# Patient Record
Sex: MALE | Race: WHITE | NOT HISPANIC OR LATINO
[De-identification: names, ages, dates, MRNs, and addresses within clinical notes are randomized per-mention and may not be internally consistent; named-entity substitution may affect disease eponyms.]

---

## 2017-02-26 ENCOUNTER — FORM ENCOUNTER (OUTPATIENT)
Age: 52
End: 2017-02-26

## 2017-02-27 ENCOUNTER — OUTPATIENT (OUTPATIENT)
Dept: OUTPATIENT SERVICES | Facility: HOSPITAL | Age: 52
LOS: 1 days | End: 2017-02-27

## 2017-02-27 ENCOUNTER — APPOINTMENT (OUTPATIENT)
Dept: ULTRASOUND IMAGING | Facility: CLINIC | Age: 52
End: 2017-02-27

## 2017-02-27 DIAGNOSIS — Z00.8 ENCOUNTER FOR OTHER GENERAL EXAMINATION: ICD-10-CM

## 2017-03-06 ENCOUNTER — APPOINTMENT (OUTPATIENT)
Dept: ULTRASOUND IMAGING | Facility: HOSPITAL | Age: 52
End: 2017-03-06

## 2018-06-22 ENCOUNTER — INPATIENT (INPATIENT)
Facility: HOSPITAL | Age: 53
LOS: 13 days | Discharge: HOME | DRG: 853 | End: 2018-07-06
Attending: SURGERY | Admitting: HOSPITALIST
Payer: MEDICARE

## 2018-06-22 VITALS — HEIGHT: 67 IN | WEIGHT: 210.1 LBS

## 2018-06-22 PROCEDURE — 99285 EMERGENCY DEPT VISIT HI MDM: CPT

## 2018-06-23 DIAGNOSIS — Z98.890 OTHER SPECIFIED POSTPROCEDURAL STATES: Chronic | ICD-10-CM

## 2018-06-23 DIAGNOSIS — K57.92 DIVERTICULITIS OF INTESTINE, PART UNSPECIFIED, WITHOUT PERFORATION OR ABSCESS WITHOUT BLEEDING: ICD-10-CM

## 2018-06-23 DIAGNOSIS — I10 ESSENTIAL (PRIMARY) HYPERTENSION: ICD-10-CM

## 2018-06-23 LAB
ALBUMIN SERPL ELPH-MCNC: 4.4 G/DL — SIGNIFICANT CHANGE UP (ref 3.3–5.2)
ALP SERPL-CCNC: 78 U/L — SIGNIFICANT CHANGE UP (ref 40–120)
ALT FLD-CCNC: 19 U/L — SIGNIFICANT CHANGE UP
ANION GAP SERPL CALC-SCNC: 19 MMOL/L — HIGH (ref 5–17)
APPEARANCE UR: CLEAR — SIGNIFICANT CHANGE UP
APTT BLD: 26.7 SEC — LOW (ref 27.5–37.4)
AST SERPL-CCNC: 17 U/L — SIGNIFICANT CHANGE UP
BACTERIA # UR AUTO: ABNORMAL
BASOPHILS # BLD AUTO: 0 K/UL — SIGNIFICANT CHANGE UP (ref 0–0.2)
BASOPHILS NFR BLD AUTO: 0.2 % — SIGNIFICANT CHANGE UP (ref 0–2)
BILIRUB SERPL-MCNC: 0.4 MG/DL — SIGNIFICANT CHANGE UP (ref 0.4–2)
BILIRUB UR-MCNC: NEGATIVE — SIGNIFICANT CHANGE UP
BUN SERPL-MCNC: 17 MG/DL — SIGNIFICANT CHANGE UP (ref 8–20)
CALCIUM SERPL-MCNC: 9.4 MG/DL — SIGNIFICANT CHANGE UP (ref 8.6–10.2)
CHLORIDE SERPL-SCNC: 99 MMOL/L — SIGNIFICANT CHANGE UP (ref 98–107)
CO2 SERPL-SCNC: 20 MMOL/L — LOW (ref 22–29)
COLOR SPEC: YELLOW — SIGNIFICANT CHANGE UP
CREAT SERPL-MCNC: 1.3 MG/DL — SIGNIFICANT CHANGE UP (ref 0.5–1.3)
DIFF PNL FLD: NEGATIVE — SIGNIFICANT CHANGE UP
EOSINOPHIL # BLD AUTO: 0 K/UL — SIGNIFICANT CHANGE UP (ref 0–0.5)
EOSINOPHIL NFR BLD AUTO: 0.4 % — SIGNIFICANT CHANGE UP (ref 0–5)
GLUCOSE SERPL-MCNC: 135 MG/DL — HIGH (ref 70–115)
GLUCOSE UR QL: NEGATIVE MG/DL — SIGNIFICANT CHANGE UP
HCT VFR BLD CALC: 48.3 % — SIGNIFICANT CHANGE UP (ref 42–52)
HGB BLD-MCNC: 16.6 G/DL — SIGNIFICANT CHANGE UP (ref 14–18)
HYALINE CASTS # UR AUTO: ABNORMAL /LPF
INR BLD: 1.04 RATIO — SIGNIFICANT CHANGE UP (ref 0.88–1.16)
KETONES UR-MCNC: NEGATIVE — SIGNIFICANT CHANGE UP
LACTATE BLDV-MCNC: 2.3 MMOL/L — HIGH (ref 0.5–2)
LACTATE BLDV-MCNC: 3.7 MMOL/L — HIGH (ref 0.5–2)
LEUKOCYTE ESTERASE UR-ACNC: NEGATIVE — SIGNIFICANT CHANGE UP
LYMPHOCYTES # BLD AUTO: 0.8 K/UL — LOW (ref 1–4.8)
LYMPHOCYTES # BLD AUTO: 6 % — LOW (ref 20–55)
MAGNESIUM SERPL-MCNC: 1.5 MG/DL — LOW (ref 1.6–2.6)
MCHC RBC-ENTMCNC: 29.9 PG — SIGNIFICANT CHANGE UP (ref 27–31)
MCHC RBC-ENTMCNC: 34.4 G/DL — SIGNIFICANT CHANGE UP (ref 32–36)
MCV RBC AUTO: 86.9 FL — SIGNIFICANT CHANGE UP (ref 80–94)
MONOCYTES # BLD AUTO: 0.5 K/UL — SIGNIFICANT CHANGE UP (ref 0–0.8)
MONOCYTES NFR BLD AUTO: 3.6 % — SIGNIFICANT CHANGE UP (ref 3–10)
NEUTROPHILS # BLD AUTO: 11.9 K/UL — HIGH (ref 1.8–8)
NEUTROPHILS NFR BLD AUTO: 89.4 % — HIGH (ref 37–73)
NITRITE UR-MCNC: NEGATIVE — SIGNIFICANT CHANGE UP
PH UR: 5 — SIGNIFICANT CHANGE UP (ref 5–8)
PLATELET # BLD AUTO: 417 K/UL — HIGH (ref 150–400)
POTASSIUM SERPL-MCNC: 4.4 MMOL/L — SIGNIFICANT CHANGE UP (ref 3.5–5.3)
POTASSIUM SERPL-SCNC: 4.4 MMOL/L — SIGNIFICANT CHANGE UP (ref 3.5–5.3)
PROT SERPL-MCNC: 8 G/DL — SIGNIFICANT CHANGE UP (ref 6.6–8.7)
PROT UR-MCNC: 30 MG/DL
PROTHROM AB SERPL-ACNC: 11.5 SEC — SIGNIFICANT CHANGE UP (ref 9.8–12.7)
RBC # BLD: 5.56 M/UL — SIGNIFICANT CHANGE UP (ref 4.6–6.2)
RBC # FLD: 12.9 % — SIGNIFICANT CHANGE UP (ref 11–15.6)
SODIUM SERPL-SCNC: 138 MMOL/L — SIGNIFICANT CHANGE UP (ref 135–145)
SP GR SPEC: 1.01 — SIGNIFICANT CHANGE UP (ref 1.01–1.02)
TROPONIN T SERPL-MCNC: <0.01 NG/ML — SIGNIFICANT CHANGE UP (ref 0–0.06)
UROBILINOGEN FLD QL: NEGATIVE MG/DL — SIGNIFICANT CHANGE UP
WBC # BLD: 13.3 K/UL — HIGH (ref 4.8–10.8)
WBC # FLD AUTO: 13.3 K/UL — HIGH (ref 4.8–10.8)
WBC UR QL: SIGNIFICANT CHANGE UP

## 2018-06-23 PROCEDURE — 71046 X-RAY EXAM CHEST 2 VIEWS: CPT | Mod: 26

## 2018-06-23 PROCEDURE — 99223 1ST HOSP IP/OBS HIGH 75: CPT | Mod: AI

## 2018-06-23 PROCEDURE — 74177 CT ABD & PELVIS W/CONTRAST: CPT | Mod: 26

## 2018-06-23 PROCEDURE — 99223 1ST HOSP IP/OBS HIGH 75: CPT

## 2018-06-23 RX ORDER — CIPROFLOXACIN LACTATE 400MG/40ML
400 VIAL (ML) INTRAVENOUS ONCE
Qty: 0 | Refills: 0 | Status: COMPLETED | OUTPATIENT
Start: 2018-06-23 | End: 2018-06-23

## 2018-06-23 RX ORDER — ENOXAPARIN SODIUM 100 MG/ML
40 INJECTION SUBCUTANEOUS DAILY
Qty: 0 | Refills: 0 | Status: DISCONTINUED | OUTPATIENT
Start: 2018-06-23 | End: 2018-06-25

## 2018-06-23 RX ORDER — ONDANSETRON 8 MG/1
4 TABLET, FILM COATED ORAL EVERY 4 HOURS
Qty: 0 | Refills: 0 | Status: DISCONTINUED | OUTPATIENT
Start: 2018-06-23 | End: 2018-07-06

## 2018-06-23 RX ORDER — CIPROFLOXACIN LACTATE 400MG/40ML
400 VIAL (ML) INTRAVENOUS EVERY 12 HOURS
Qty: 0 | Refills: 0 | Status: DISCONTINUED | OUTPATIENT
Start: 2018-06-23 | End: 2018-06-23

## 2018-06-23 RX ORDER — ACETAMINOPHEN 500 MG
975 TABLET ORAL ONCE
Qty: 0 | Refills: 0 | Status: COMPLETED | OUTPATIENT
Start: 2018-06-23 | End: 2018-06-23

## 2018-06-23 RX ORDER — SACCHAROMYCES BOULARDII 250 MG
250 POWDER IN PACKET (EA) ORAL
Qty: 0 | Refills: 0 | Status: DISCONTINUED | OUTPATIENT
Start: 2018-06-23 | End: 2018-06-30

## 2018-06-23 RX ORDER — METRONIDAZOLE 500 MG
500 TABLET ORAL ONCE
Qty: 0 | Refills: 0 | Status: COMPLETED | OUTPATIENT
Start: 2018-06-23 | End: 2018-06-23

## 2018-06-23 RX ORDER — LOSARTAN POTASSIUM 100 MG/1
1 TABLET, FILM COATED ORAL
Qty: 0 | Refills: 0 | COMMUNITY

## 2018-06-23 RX ORDER — LOSARTAN POTASSIUM 100 MG/1
25 TABLET, FILM COATED ORAL DAILY
Qty: 0 | Refills: 0 | Status: DISCONTINUED | OUTPATIENT
Start: 2018-06-23 | End: 2018-06-30

## 2018-06-23 RX ORDER — MAGNESIUM SULFATE 500 MG/ML
1 VIAL (ML) INJECTION ONCE
Qty: 0 | Refills: 0 | Status: COMPLETED | OUTPATIENT
Start: 2018-06-23 | End: 2018-06-23

## 2018-06-23 RX ORDER — MORPHINE SULFATE 50 MG/1
4 CAPSULE, EXTENDED RELEASE ORAL ONCE
Qty: 0 | Refills: 0 | Status: DISCONTINUED | OUTPATIENT
Start: 2018-06-23 | End: 2018-06-23

## 2018-06-23 RX ORDER — METOCLOPRAMIDE HCL 10 MG
10 TABLET ORAL ONCE
Qty: 0 | Refills: 0 | Status: COMPLETED | OUTPATIENT
Start: 2018-06-23 | End: 2018-06-23

## 2018-06-23 RX ORDER — HYDROMORPHONE HYDROCHLORIDE 2 MG/ML
1 INJECTION INTRAMUSCULAR; INTRAVENOUS; SUBCUTANEOUS EVERY 4 HOURS
Qty: 0 | Refills: 0 | Status: DISCONTINUED | OUTPATIENT
Start: 2018-06-23 | End: 2018-06-30

## 2018-06-23 RX ORDER — LOSARTAN POTASSIUM 100 MG/1
25 TABLET, FILM COATED ORAL DAILY
Qty: 0 | Refills: 0 | Status: DISCONTINUED | OUTPATIENT
Start: 2018-06-23 | End: 2018-06-23

## 2018-06-23 RX ORDER — METRONIDAZOLE 500 MG
500 TABLET ORAL EVERY 8 HOURS
Qty: 0 | Refills: 0 | Status: DISCONTINUED | OUTPATIENT
Start: 2018-06-23 | End: 2018-06-23

## 2018-06-23 RX ORDER — PANTOPRAZOLE SODIUM 20 MG/1
40 TABLET, DELAYED RELEASE ORAL
Qty: 0 | Refills: 0 | Status: DISCONTINUED | OUTPATIENT
Start: 2018-06-23 | End: 2018-06-30

## 2018-06-23 RX ORDER — MORPHINE SULFATE 50 MG/1
2 CAPSULE, EXTENDED RELEASE ORAL EVERY 4 HOURS
Qty: 0 | Refills: 0 | Status: DISCONTINUED | OUTPATIENT
Start: 2018-06-23 | End: 2018-06-23

## 2018-06-23 RX ORDER — SODIUM CHLORIDE 9 MG/ML
2500 INJECTION INTRAMUSCULAR; INTRAVENOUS; SUBCUTANEOUS
Qty: 0 | Refills: 0 | Status: DISCONTINUED | OUTPATIENT
Start: 2018-06-23 | End: 2018-06-23

## 2018-06-23 RX ORDER — ERTAPENEM SODIUM 1 G/1
1000 INJECTION, POWDER, LYOPHILIZED, FOR SOLUTION INTRAMUSCULAR; INTRAVENOUS EVERY 24 HOURS
Qty: 0 | Refills: 0 | Status: DISCONTINUED | OUTPATIENT
Start: 2018-06-23 | End: 2018-06-25

## 2018-06-23 RX ORDER — SODIUM CHLORIDE 9 MG/ML
1000 INJECTION, SOLUTION INTRAVENOUS
Qty: 0 | Refills: 0 | Status: DISCONTINUED | OUTPATIENT
Start: 2018-06-23 | End: 2018-06-29

## 2018-06-23 RX ORDER — MORPHINE SULFATE 50 MG/1
2 CAPSULE, EXTENDED RELEASE ORAL EVERY 4 HOURS
Qty: 0 | Refills: 0 | Status: DISCONTINUED | OUTPATIENT
Start: 2018-06-23 | End: 2018-06-30

## 2018-06-23 RX ORDER — MAGNESIUM SULFATE 500 MG/ML
2 VIAL (ML) INJECTION ONCE
Qty: 0 | Refills: 0 | Status: COMPLETED | OUTPATIENT
Start: 2018-06-23 | End: 2018-06-23

## 2018-06-23 RX ADMIN — SODIUM CHLORIDE 125 MILLILITER(S): 9 INJECTION, SOLUTION INTRAVENOUS at 21:16

## 2018-06-23 RX ADMIN — MORPHINE SULFATE 2 MILLIGRAM(S): 50 CAPSULE, EXTENDED RELEASE ORAL at 16:39

## 2018-06-23 RX ADMIN — ENOXAPARIN SODIUM 40 MILLIGRAM(S): 100 INJECTION SUBCUTANEOUS at 12:29

## 2018-06-23 RX ADMIN — MORPHINE SULFATE 2 MILLIGRAM(S): 50 CAPSULE, EXTENDED RELEASE ORAL at 10:49

## 2018-06-23 RX ADMIN — Medication 100 GRAM(S): at 03:35

## 2018-06-23 RX ADMIN — Medication 104 MILLIGRAM(S): at 00:45

## 2018-06-23 RX ADMIN — Medication 100 MILLIGRAM(S): at 05:02

## 2018-06-23 RX ADMIN — MORPHINE SULFATE 4 MILLIGRAM(S): 50 CAPSULE, EXTENDED RELEASE ORAL at 01:05

## 2018-06-23 RX ADMIN — ERTAPENEM SODIUM 120 MILLIGRAM(S): 1 INJECTION, POWDER, LYOPHILIZED, FOR SOLUTION INTRAMUSCULAR; INTRAVENOUS at 15:06

## 2018-06-23 RX ADMIN — MORPHINE SULFATE 4 MILLIGRAM(S): 50 CAPSULE, EXTENDED RELEASE ORAL at 00:45

## 2018-06-23 RX ADMIN — Medication 975 MILLIGRAM(S): at 03:30

## 2018-06-23 RX ADMIN — Medication 250 MILLIGRAM(S): at 17:05

## 2018-06-23 RX ADMIN — HYDROMORPHONE HYDROCHLORIDE 1 MILLIGRAM(S): 2 INJECTION INTRAMUSCULAR; INTRAVENOUS; SUBCUTANEOUS at 21:15

## 2018-06-23 RX ADMIN — Medication 50 GRAM(S): at 12:28

## 2018-06-23 RX ADMIN — Medication 200 MILLIGRAM(S): at 03:35

## 2018-06-23 RX ADMIN — SODIUM CHLORIDE 125 MILLILITER(S): 9 INJECTION, SOLUTION INTRAVENOUS at 12:28

## 2018-06-23 RX ADMIN — MORPHINE SULFATE 2 MILLIGRAM(S): 50 CAPSULE, EXTENDED RELEASE ORAL at 11:05

## 2018-06-23 RX ADMIN — HYDROMORPHONE HYDROCHLORIDE 1 MILLIGRAM(S): 2 INJECTION INTRAMUSCULAR; INTRAVENOUS; SUBCUTANEOUS at 21:30

## 2018-06-23 RX ADMIN — PANTOPRAZOLE SODIUM 40 MILLIGRAM(S): 20 TABLET, DELAYED RELEASE ORAL at 12:28

## 2018-06-23 RX ADMIN — SODIUM CHLORIDE 500 MILLILITER(S): 9 INJECTION INTRAMUSCULAR; INTRAVENOUS; SUBCUTANEOUS at 00:45

## 2018-06-23 RX ADMIN — MORPHINE SULFATE 2 MILLIGRAM(S): 50 CAPSULE, EXTENDED RELEASE ORAL at 17:01

## 2018-06-23 RX ADMIN — SODIUM CHLORIDE 500 MILLILITER(S): 9 INJECTION INTRAMUSCULAR; INTRAVENOUS; SUBCUTANEOUS at 08:03

## 2018-06-23 RX ADMIN — SODIUM CHLORIDE 500 MILLILITER(S): 9 INJECTION INTRAMUSCULAR; INTRAVENOUS; SUBCUTANEOUS at 00:46

## 2018-06-23 RX ADMIN — SODIUM CHLORIDE 125 MILLILITER(S): 9 INJECTION, SOLUTION INTRAVENOUS at 16:40

## 2018-06-23 NOTE — ED PROVIDER NOTE - PHYSICAL EXAMINATION
Constitutional : Appears uncomfortably,in moderate distress,  talking in short sentences  Head :NC AT , no swelling  Eyes :eomi, no swelling  Mouth :mm dry,  Neck : supple, trachea in midline  Chest :wheezing, distant crackles  Heart :S1 S2 distant  Abdomen :abd distended, decreased bowel sounds, LLQ tenderness with localized guarding  Musc/Skel :ext no swelling, no deformity, no spine tenderness, distal pulses present  Neuro  :AAO 3 no focal deficits

## 2018-06-23 NOTE — H&P ADULT - RS GEN PE MLT RESP DETAILS PC
no rales/good air movement/no rhonchi/respirations non-labored/clear to auscultation bilaterally/airway patent/breath sounds equal

## 2018-06-23 NOTE — H&P ADULT - HISTORY OF PRESENT ILLNESS
53 yo male with HTN presented to the ED last night with c/o abdominal pain , started around 5 pm sudden onset in lower abdomen sharp radiates to both abdominal area , mostly in the right lower area . It is controlled with pain meds , now intermittent . He denies any associated nausea, vomiting

## 2018-06-23 NOTE — ED ADULT NURSE REASSESSMENT NOTE - NS ED NURSE REASSESS COMMENT FT1
pt c/o a lot of abd pain Dr Frederick made aware medication to be ordered.
pt febrile at 105.3 rectal. MD made aware, IV fluids initiated. pt ambulating with steady gait to bathroom. will continue to monitor.
pt received asleep easily aroused resp even and unlabored states that he feels a little better c/o 7/10 abd pain no diarrhea voiding well. pt iv fluids infusing well no redness or swelling present. pt awaiting bed assignment.

## 2018-06-23 NOTE — H&P ADULT - NSHPLABSRESULTS_GEN_ALL_CORE
16.6   13.3  )-----------( 417      ( 23 Jun 2018 01:02 )             48.3   06-23    138  |  99  |  17.0  ----------------------------<  135<H>  4.4   |  20.0<L>  |  1.30    Ca    9.4      23 Jun 2018 01:02  Mg     1.5     06-23    TPro  8.0  /  Alb  4.4  /  TBili  0.4  /  DBili  x   /  AST  17  /  ALT  19  /  AlkPhos  78  06-23

## 2018-06-23 NOTE — ED PROVIDER NOTE - OBJECTIVE STATEMENT
51 y/o M Pt with a hx of HTN, HDL, borderline diabetic, presents to the ED with chills and lower abd pain onset 5 hours ago today. Pt is also experiencing constipation and is taking pain medications for chronic back pain. He also notes penile discharge. Current smoker. Denies N/V/D, recent travel, recent abx, dysuria, hematuria. No other acute complaints at this time.

## 2018-06-23 NOTE — H&P ADULT - ASSESSMENT
51 yo male obese smoker with HTN admitted for abdominal pain , fever , CT scan positive for acute diverticulitis   sepsis on admission

## 2018-06-23 NOTE — ED ADULT NURSE NOTE - OBJECTIVE STATEMENT
pt a+ox3, presents to ED c/o abd pain. pt denies n/v/d, fever or chills. pt reports sudden onset of pain.

## 2018-06-23 NOTE — H&P ADULT - PROBLEM SELECTOR PLAN 1
iv invanz, morphine for pain, NPO  Gi consult called   sepsis work up blood cx ordered  repeat lactate in am , cont iv hydration

## 2018-06-23 NOTE — CONSULT NOTE ADULT - SUBJECTIVE AND OBJECTIVE BOX
Patient is a 52y old  Male who presents with a chief complaint of abdominal pain (23 Jun 2018 12:08)      HPI:  51 yo male with HTN , chronic back pain on Vicodin. He began with hypogastric pain radiating to right lower quadrant. Pain is sharp, continuous, severe  and started about 18 hours ago. Better after pain meds in ER. Now 5/10 intensity. No nausea or vomiting. + chills, but did not check temp. Fever 105 in ER . + mild constipation that he associated with vicodin. Has never had colonoscopy. NO family hx of colon cancer or polyps.         REVIEW OF SYSTEMS:  Constitutional: No fever, weight loss or fatigue  ENMT:  No difficulty hearing, tinnitus, vertigo; No sinus or throat pain  Respiratory: No cough, wheezing, chills or hemoptysis  Cardiovascular: No chest pain, palpitations, dizziness or leg swelling  Gastrointestinal: + abdominal  pain. No nausea, vomiting or hematemesis; No diarrhea or constipation. No melena or hematochezia.  Skin: No itching, burning, rashes or lesions   Musculoskeletal: No joint pain or swelling; No muscle, back or extremity pain    PAST MEDICAL & SURGICAL HISTORY:  Essential hypertension  Herniated cervical disc: with myelopathy into hands  H/O hernia repair      FAMILY HISTORY:  No pertinent family history in first degree relatives      SOCIAL HISTORY:  Smoking Status: [ ] Current, [ ] Former, [ ] Never  Pack Years:  [  ] EtOH-no  [  ] IVDA-no    MEDICATIONS:  MEDICATIONS  (STANDING):  enoxaparin Injectable 40 milliGRAM(s) SubCutaneous daily  ertapenem  IVPB 1000 milliGRAM(s) IV Intermittent every 24 hours  lactated ringers. 1000 milliLiter(s) (125 mL/Hr) IV Continuous <Continuous>  losartan 25 milliGRAM(s) Oral daily  pantoprazole    Tablet 40 milliGRAM(s) Oral before breakfast  saccharomyces boulardii 250 milliGRAM(s) Oral two times a day    MEDICATIONS  (PRN):  HYDROmorphone  Injectable 1 milliGRAM(s) IV Push every 4 hours PRN Severe Pain (7 - 10)  morphine  - Injectable 2 milliGRAM(s) IV Push every 4 hours PRN Moderate Pain (4 - 6)  ondansetron Injectable 4 milliGRAM(s) IV Push every 4 hours PRN Nausea and/or Vomiting      Allergies    No Known Allergies    Intolerances        Vital Signs Last 24 Hrs  T(C): 38.2 (23 Jun 2018 10:54), Max: 40.7 (23 Jun 2018 03:32)  T(F): 100.7 (23 Jun 2018 10:54), Max: 105.3 (23 Jun 2018 03:32)  HR: 89 (23 Jun 2018 10:54) (89 - 125)  BP: 117/69 (23 Jun 2018 10:54) (107/63 - 160/98)  BP(mean): --  RR: 20 (23 Jun 2018 10:54) (16 - 20)  SpO2: 94% (23 Jun 2018 10:54) (94% - 96%)        PHYSICAL EXAM:    General: overweight  well nourished; in no acute distress  HEENT: MMM, conjunctiva and sclera clear  H- RRR  L- CTA  Gastrointestinal: Soft, + -tender on palpation in the hypogastric area. non-distended; Normal bowel sounds; No rebound or guarding  Extremities: Normal range of motion, No clubbing, cyanosis or edema  Neurological: Alert and oriented x3  Skin: Warm and dry. No obvious rash        LABS:                        16.6   13.3  )-----------( 417      ( 23 Jun 2018 01:02 )             48.3     23 Jun 2018 01:02    138    |  99     |  17.0   ----------------------------<  135    4.4     |  20.0   |  1.30     Ca    9.4        23 Jun 2018 01:02  Mg     1.5       23 Jun 2018 01:02    TPro  8.0    /  Alb  4.4    /  TBili  0.4    /  DBili  x      /  AST  17     /  ALT  19     /  AlkPhos  78     / Amylase x      /Lipase x      23 Jun 2018 01:02              RADIOLOGY & ADDITIONAL STUDIES:     < from: CT Abdomen and Pelvis w/ IV Cont (06.23.18 @ 04:42) >  MPRESSION:   Acute uncomplicated sigmoid diverticulitis.    Nonspecific trang hepatis and upper abdominal lymph nodes, mildly   increased in size from prior study.    Trace right pleural effusion. Nonspecific groundglass attenuation right   lower lobe which could be due to atelectasis.    < end of copied text >

## 2018-06-23 NOTE — ED PROVIDER NOTE - NS ED ROS FT
no weight change, no fever +chills  no rash, no bruises  no visual changes no eye discharge  no cough cold or congestion,   no sob, no chest pain  no orthopnea, no pnd  +abd pain, no n/v/d  no hematuria, no change in urinary habits  no joint pain, no deformity  no headache, no paresthesia

## 2018-06-24 DIAGNOSIS — A41.89 OTHER SPECIFIED SEPSIS: ICD-10-CM

## 2018-06-24 LAB
ANION GAP SERPL CALC-SCNC: 14 MMOL/L — SIGNIFICANT CHANGE UP (ref 5–17)
BUN SERPL-MCNC: 14 MG/DL — SIGNIFICANT CHANGE UP (ref 8–20)
C TRACH RRNA SPEC QL NAA+PROBE: SIGNIFICANT CHANGE UP
CALCIUM SERPL-MCNC: 8.8 MG/DL — SIGNIFICANT CHANGE UP (ref 8.6–10.2)
CHLORIDE SERPL-SCNC: 100 MMOL/L — SIGNIFICANT CHANGE UP (ref 98–107)
CO2 SERPL-SCNC: 22 MMOL/L — SIGNIFICANT CHANGE UP (ref 22–29)
CREAT SERPL-MCNC: 1.04 MG/DL — SIGNIFICANT CHANGE UP (ref 0.5–1.3)
CULTURE RESULTS: SIGNIFICANT CHANGE UP
GLUCOSE SERPL-MCNC: 96 MG/DL — SIGNIFICANT CHANGE UP (ref 70–115)
HCT VFR BLD CALC: 40 % — LOW (ref 42–52)
HGB BLD-MCNC: 13.3 G/DL — LOW (ref 14–18)
LACTATE BLDV-MCNC: 1.2 MMOL/L — SIGNIFICANT CHANGE UP (ref 0.5–2)
MCHC RBC-ENTMCNC: 29.2 PG — SIGNIFICANT CHANGE UP (ref 27–31)
MCHC RBC-ENTMCNC: 33.3 G/DL — SIGNIFICANT CHANGE UP (ref 32–36)
MCV RBC AUTO: 87.7 FL — SIGNIFICANT CHANGE UP (ref 80–94)
N GONORRHOEA RRNA SPEC QL NAA+PROBE: SIGNIFICANT CHANGE UP
PLATELET # BLD AUTO: 300 K/UL — SIGNIFICANT CHANGE UP (ref 150–400)
POTASSIUM SERPL-MCNC: 4.5 MMOL/L — SIGNIFICANT CHANGE UP (ref 3.5–5.3)
POTASSIUM SERPL-SCNC: 4.5 MMOL/L — SIGNIFICANT CHANGE UP (ref 3.5–5.3)
RBC # BLD: 4.56 M/UL — LOW (ref 4.6–6.2)
RBC # FLD: 13.3 % — SIGNIFICANT CHANGE UP (ref 11–15.6)
SODIUM SERPL-SCNC: 136 MMOL/L — SIGNIFICANT CHANGE UP (ref 135–145)
SPECIMEN SOURCE: SIGNIFICANT CHANGE UP
SPECIMEN SOURCE: SIGNIFICANT CHANGE UP
WBC # BLD: 14.2 K/UL — HIGH (ref 4.8–10.8)
WBC # FLD AUTO: 14.2 K/UL — HIGH (ref 4.8–10.8)

## 2018-06-24 PROCEDURE — 99233 SBSQ HOSP IP/OBS HIGH 50: CPT

## 2018-06-24 RX ADMIN — ERTAPENEM SODIUM 120 MILLIGRAM(S): 1 INJECTION, POWDER, LYOPHILIZED, FOR SOLUTION INTRAMUSCULAR; INTRAVENOUS at 17:40

## 2018-06-24 RX ADMIN — SODIUM CHLORIDE 125 MILLILITER(S): 9 INJECTION, SOLUTION INTRAVENOUS at 13:24

## 2018-06-24 RX ADMIN — SODIUM CHLORIDE 125 MILLILITER(S): 9 INJECTION, SOLUTION INTRAVENOUS at 21:01

## 2018-06-24 RX ADMIN — HYDROMORPHONE HYDROCHLORIDE 1 MILLIGRAM(S): 2 INJECTION INTRAMUSCULAR; INTRAVENOUS; SUBCUTANEOUS at 02:50

## 2018-06-24 RX ADMIN — MORPHINE SULFATE 2 MILLIGRAM(S): 50 CAPSULE, EXTENDED RELEASE ORAL at 20:14

## 2018-06-24 RX ADMIN — HYDROMORPHONE HYDROCHLORIDE 1 MILLIGRAM(S): 2 INJECTION INTRAMUSCULAR; INTRAVENOUS; SUBCUTANEOUS at 13:28

## 2018-06-24 RX ADMIN — SODIUM CHLORIDE 125 MILLILITER(S): 9 INJECTION, SOLUTION INTRAVENOUS at 12:04

## 2018-06-24 RX ADMIN — PANTOPRAZOLE SODIUM 40 MILLIGRAM(S): 20 TABLET, DELAYED RELEASE ORAL at 05:45

## 2018-06-24 RX ADMIN — SODIUM CHLORIDE 125 MILLILITER(S): 9 INJECTION, SOLUTION INTRAVENOUS at 02:51

## 2018-06-24 RX ADMIN — Medication 250 MILLIGRAM(S): at 05:45

## 2018-06-24 RX ADMIN — MORPHINE SULFATE 2 MILLIGRAM(S): 50 CAPSULE, EXTENDED RELEASE ORAL at 20:10

## 2018-06-24 RX ADMIN — HYDROMORPHONE HYDROCHLORIDE 1 MILLIGRAM(S): 2 INJECTION INTRAMUSCULAR; INTRAVENOUS; SUBCUTANEOUS at 03:05

## 2018-06-24 RX ADMIN — HYDROMORPHONE HYDROCHLORIDE 1 MILLIGRAM(S): 2 INJECTION INTRAMUSCULAR; INTRAVENOUS; SUBCUTANEOUS at 07:01

## 2018-06-24 RX ADMIN — Medication 250 MILLIGRAM(S): at 17:40

## 2018-06-24 RX ADMIN — HYDROMORPHONE HYDROCHLORIDE 1 MILLIGRAM(S): 2 INJECTION INTRAMUSCULAR; INTRAVENOUS; SUBCUTANEOUS at 07:16

## 2018-06-24 RX ADMIN — LOSARTAN POTASSIUM 25 MILLIGRAM(S): 100 TABLET, FILM COATED ORAL at 05:45

## 2018-06-24 RX ADMIN — HYDROMORPHONE HYDROCHLORIDE 1 MILLIGRAM(S): 2 INJECTION INTRAMUSCULAR; INTRAVENOUS; SUBCUTANEOUS at 13:23

## 2018-06-24 RX ADMIN — ENOXAPARIN SODIUM 40 MILLIGRAM(S): 100 INJECTION SUBCUTANEOUS at 12:04

## 2018-06-24 NOTE — PROGRESS NOTE ADULT - SUBJECTIVE AND OBJECTIVE BOX
Patient is a 52y old  Male who presents with a chief complaint of abdominal pain (23 Jun 2018 12:08)      HPI:  51 yo male with HTN - stateslower mid abdominal pain is still severe at times  until he gets pain meds. ( can go up to 9/10 severity, but 4/10 with pain meds). Temp was 100.1 . No nausea. has been NPO    REVIEW OF SYSTEMS:  Constitutional: No fever, weight loss or fatigue  ENMT:  No difficulty hearing, tinnitus, vertigo; No sinus or throat pain  Respiratory: No cough, wheezing, chills or hemoptysis  Cardiovascular: No chest pain, palpitations, dizziness or leg swelling  Gastrointestinal:+ abdominal or epigastric pain. No nausea, vomiting or hematemesis; No diarrhea or constipation. No melena or hematochezia.  Skin: No itching, burning, rashes or lesions   Musculoskeletal: No joint pain or swelling; No muscle, back or extremity pain    PAST MEDICAL & SURGICAL HISTORY:  Essential hypertension  Herniated cervical disc: with myelopathy into hands  H/O hernia repair      FAMILY HISTORY:  No pertinent family history in first degree relatives      SOCIAL HISTORY:  Smoking Status: [ ] Current, [ ] Former, [ ] Never  Pack Years:  [  ] EtOH-no  [  ] IVDA-no    MEDICATIONS:  MEDICATIONS  (STANDING):  enoxaparin Injectable 40 milliGRAM(s) SubCutaneous daily  ertapenem  IVPB 1000 milliGRAM(s) IV Intermittent every 24 hours  lactated ringers. 1000 milliLiter(s) (125 mL/Hr) IV Continuous <Continuous>  losartan 25 milliGRAM(s) Oral daily  pantoprazole    Tablet 40 milliGRAM(s) Oral before breakfast  saccharomyces boulardii 250 milliGRAM(s) Oral two times a day    MEDICATIONS  (PRN):  HYDROmorphone  Injectable 1 milliGRAM(s) IV Push every 4 hours PRN Severe Pain (7 - 10)  morphine  - Injectable 2 milliGRAM(s) IV Push every 4 hours PRN Moderate Pain (4 - 6)  ondansetron Injectable 4 milliGRAM(s) IV Push every 4 hours PRN Nausea and/or Vomiting      Allergies    No Known Allergies    Intolerances        Vital Signs Last 24 Hrs  T(C): 37.8 (24 Jun 2018 08:01), Max: 38.2 (23 Jun 2018 10:54)  T(F): 100.1 (24 Jun 2018 08:01), Max: 100.7 (23 Jun 2018 10:54)  HR: 98 (24 Jun 2018 08:01) (84 - 98)  BP: 142/88 (24 Jun 2018 08:01) (117/69 - 142/88)  BP(mean): --  RR: 18 (24 Jun 2018 08:01) (18 - 20)  SpO2: 93% (24 Jun 2018 08:01) (93% - 94%)    06-23 @ 07:01  -  06-24 @ 07:00  --------------------------------------------------------  IN: 1500 mL / OUT: 0 mL / NET: 1500 mL          PHYSICAL EXAM:    General: Well developed; overweight  appear to be uncomfortable   HEENT: MMM, conjunctiva and sclera clear  H- RRR  l -CTA  Gastrointestinal: Soft, + -tenderness in the lower abdomen , non-distended; Normal bowel sounds; No rebound or guarding  Extremities: Normal range of motion, No clubbing, cyanosis or edema  Neurological: Alert and oriented x3  Skin: Warm and dry. No obvious rash      LABS:                        13.3   14.2  )-----------( 300      ( 24 Jun 2018 05:39 )             40.0     24 Jun 2018 05:39    136    |  100    |  14.0   ----------------------------<  96     4.5     |  22.0   |  1.04     Ca    8.8        24 Jun 2018 05:39  Mg     1.5       23 Jun 2018 01:02    TPro  8.0    /  Alb  4.4    /  TBili  0.4    /  DBili  x      /  AST  17     /  ALT  19     /  AlkPhos  78     / Amylase x      /Lipase x      23 Jun 2018 01:02              RADIOLOGY & ADDITIONAL STUDIES:     < from: CT Abdomen and Pelvis w/ IV Cont (06.23.18 @ 04:42) >  MPRESSION:   Acute uncomplicated sigmoid diverticulitis.    Nonspecific trang hepatis and upper abdominal lymph nodes, mildly   increased in size from prior study.    Trace right pleural effusion. Nonspecific groundglass attenuation right   lower lobe which could be due to atelectasis.    < end of copied text >

## 2018-06-24 NOTE — PROGRESS NOTE ADULT - SUBJECTIVE AND OBJECTIVE BOX
Patient is a 52y old  Male who presents with a chief complaint of abdominal pain   still having abdominal pain on off ,  relieves  with pain medication    HPI:  53 yo male with HTN presented to the ED last night with c/o abdominal pain , started around 5 pm sudden onset in lower abdomen sharp radiates to both abdominal area , mostly in the right lower area . It is controlled with pain meds , now intermittent . He denies any associated nausea, vomiting (2018 12:08)      Allergies    No Known Allergies    Intolerances        REVIEW OF SYSTEMS:  abdominal pain     Vital Signs Last 24 Hrs  T(C): 37.4 (2018 12:13), Max: 37.8 (2018 05:30)  T(F): 99.3 (2018 12:13), Max: 100.1 (2018 05:30)  HR: 101 (:13) (84 - 101)  BP: 148/92 (2018 12:13) (120/72 - 148/92)  BP(mean): --  RR: 18 (2018 12:13) (18 - 20)  SpO2: 92% (2018 12:13) (92% - 94%)    PHYSICAL EXAM:    GENERAL: NAD, well-groomed  CHEST/LUNG: Clear to auscultation  bilaterally; No rales, rhonchi, wheezing   HEART: Regular rate and rhythm; S1 /S2  No murmurs, rubs  ABDOMEN: Soft, RLQ tenderness on palpation , + rebound Nondistended; Bowel sounds present  EXTREMITIES:  No clubbing, cyanosis, or edema      LABS:                        13.3   14.2  )-----------( 300      ( 2018 05:39 )             40.0     06-24    136  |  100  |  14.0  ----------------------------<  96  4.5   |  22.0  |  1.04    Ca    8.8      2018 05:39  Mg     1.5     06-23    TPro  8.0  /  Alb  4.4  /  TBili  0.4  /  DBili  x   /  AST  17  /  ALT  19  /  AlkPhos  78  06-23    PT/INR - ( 2018 01:02 )   PT: 11.5 sec;   INR: 1.04 ratio         PTT - ( 2018 01:02 )  PTT:26.7 sec  Urinalysis Basic - ( 2018 04:10 )    Color: Yellow / Appearance: Clear / S.015 / pH: x  Gluc: x / Ketone: Negative  / Bili: Negative / Urobili: Negative mg/dL   Blood: x / Protein: 30 mg/dL / Nitrite: Negative   Leuk Esterase: Negative / RBC: x / WBC 0-2   Sq Epi: x / Non Sq Epi: x / Bacteria: Occasional        RADIOLOGY & ADDITIONAL TESTS:

## 2018-06-24 NOTE — PROGRESS NOTE ADULT - PROBLEM SELECTOR PLAN 1
- being treated with Invanz  - will  keep NPO until pain is better  - lactate is now normal, but WBC elevated, fever is better,   - check blood cultures

## 2018-06-25 DIAGNOSIS — K57.20 DIVERTICULITIS OF LARGE INTESTINE WITH PERFORATION AND ABSCESS WITHOUT BLEEDING: ICD-10-CM

## 2018-06-25 DIAGNOSIS — A41.9 SEPSIS, UNSPECIFIED ORGANISM: ICD-10-CM

## 2018-06-25 LAB
ALBUMIN SERPL ELPH-MCNC: 3.5 G/DL — SIGNIFICANT CHANGE UP (ref 3.3–5.2)
ALP SERPL-CCNC: 60 U/L — SIGNIFICANT CHANGE UP (ref 40–120)
ALT FLD-CCNC: 9 U/L — SIGNIFICANT CHANGE UP
ANION GAP SERPL CALC-SCNC: 15 MMOL/L — SIGNIFICANT CHANGE UP (ref 5–17)
APTT BLD: 28.3 SEC — SIGNIFICANT CHANGE UP (ref 27.5–37.4)
AST SERPL-CCNC: <5 U/L — SIGNIFICANT CHANGE UP
B PERT IGG+IGM PNL SER: ABNORMAL
BASOPHILS # BLD AUTO: 0 K/UL — SIGNIFICANT CHANGE UP (ref 0–0.2)
BASOPHILS NFR BLD AUTO: 0.1 % — SIGNIFICANT CHANGE UP (ref 0–2)
BILIRUB SERPL-MCNC: 0.9 MG/DL — SIGNIFICANT CHANGE UP (ref 0.4–2)
BUN SERPL-MCNC: 13 MG/DL — SIGNIFICANT CHANGE UP (ref 8–20)
CALCIUM SERPL-MCNC: 8.9 MG/DL — SIGNIFICANT CHANGE UP (ref 8.6–10.2)
CHLORIDE SERPL-SCNC: 94 MMOL/L — LOW (ref 98–107)
CO2 SERPL-SCNC: 23 MMOL/L — SIGNIFICANT CHANGE UP (ref 22–29)
COLOR FLD: YELLOW
CREAT SERPL-MCNC: 1.02 MG/DL — SIGNIFICANT CHANGE UP (ref 0.5–1.3)
EOSINOPHIL # BLD AUTO: 0 K/UL — SIGNIFICANT CHANGE UP (ref 0–0.5)
EOSINOPHIL NFR BLD AUTO: 0.1 % — SIGNIFICANT CHANGE UP (ref 0–5)
FLUID INTAKE SUBSTANCE CLASS: SIGNIFICANT CHANGE UP
FLUID SEGMENTED GRANULOCYTES: 95 % — SIGNIFICANT CHANGE UP
GLUCOSE BLDC GLUCOMTR-MCNC: 112 MG/DL — HIGH (ref 70–99)
GLUCOSE SERPL-MCNC: 103 MG/DL — SIGNIFICANT CHANGE UP (ref 70–115)
GRAM STN FLD: SIGNIFICANT CHANGE UP
HCT VFR BLD CALC: 38.8 % — LOW (ref 42–52)
HGB BLD-MCNC: 13.3 G/DL — LOW (ref 14–18)
INR BLD: 1.2 RATIO — HIGH (ref 0.88–1.16)
LACTATE BLDV-MCNC: 1 MMOL/L — SIGNIFICANT CHANGE UP (ref 0.5–2)
LYMPHOCYTES # BLD AUTO: 1 K/UL — SIGNIFICANT CHANGE UP (ref 1–4.8)
LYMPHOCYTES # BLD AUTO: 6.2 % — LOW (ref 20–55)
LYMPHOCYTES # FLD: 2 % — SIGNIFICANT CHANGE UP
MCHC RBC-ENTMCNC: 29.6 PG — SIGNIFICANT CHANGE UP (ref 27–31)
MCHC RBC-ENTMCNC: 34.3 G/DL — SIGNIFICANT CHANGE UP (ref 32–36)
MCV RBC AUTO: 86.2 FL — SIGNIFICANT CHANGE UP (ref 80–94)
MONOCYTES # BLD AUTO: 1 K/UL — HIGH (ref 0–0.8)
MONOCYTES NFR BLD AUTO: 5.8 % — SIGNIFICANT CHANGE UP (ref 3–10)
MONOS+MACROS # FLD: 3 % — SIGNIFICANT CHANGE UP
NEUTROPHILS # BLD AUTO: 14.6 K/UL — HIGH (ref 1.8–8)
NEUTROPHILS NFR BLD AUTO: 87.4 % — HIGH (ref 37–73)
PLATELET # BLD AUTO: 296 K/UL — SIGNIFICANT CHANGE UP (ref 150–400)
POTASSIUM SERPL-MCNC: 4.1 MMOL/L — SIGNIFICANT CHANGE UP (ref 3.5–5.3)
POTASSIUM SERPL-SCNC: 4.1 MMOL/L — SIGNIFICANT CHANGE UP (ref 3.5–5.3)
PROCALCITONIN SERPL-MCNC: 3.66 NG/ML — HIGH (ref 0–0.04)
PROT SERPL-MCNC: 6.9 G/DL — SIGNIFICANT CHANGE UP (ref 6.6–8.7)
PROTHROM AB SERPL-ACNC: 13.3 SEC — HIGH (ref 9.8–12.7)
RBC # BLD: 4.5 M/UL — LOW (ref 4.6–6.2)
RBC # FLD: 12.9 % — SIGNIFICANT CHANGE UP (ref 11–15.6)
RCV VOL RI: 2750 /UL — HIGH (ref 0–5)
SODIUM SERPL-SCNC: 132 MMOL/L — LOW (ref 135–145)
SPECIMEN SOURCE: SIGNIFICANT CHANGE UP
TOTAL NUCLEATED CELL COUNT, BODY FLUID: HIGH /UL (ref 0–5)
TUBE TYPE: SIGNIFICANT CHANGE UP
WBC # BLD: 16.6 K/UL — HIGH (ref 4.8–10.8)
WBC # FLD AUTO: 16.6 K/UL — HIGH (ref 4.8–10.8)

## 2018-06-25 PROCEDURE — 99233 SBSQ HOSP IP/OBS HIGH 50: CPT

## 2018-06-25 PROCEDURE — 93010 ELECTROCARDIOGRAM REPORT: CPT

## 2018-06-25 PROCEDURE — 71045 X-RAY EXAM CHEST 1 VIEW: CPT | Mod: 26

## 2018-06-25 PROCEDURE — 99222 1ST HOSP IP/OBS MODERATE 55: CPT

## 2018-06-25 PROCEDURE — 74177 CT ABD & PELVIS W/CONTRAST: CPT | Mod: 26

## 2018-06-25 PROCEDURE — 99223 1ST HOSP IP/OBS HIGH 75: CPT

## 2018-06-25 PROCEDURE — 49406 IMAGE CATH FLUID PERI/RETRO: CPT

## 2018-06-25 RX ORDER — ACETAMINOPHEN 500 MG
650 TABLET ORAL EVERY 6 HOURS
Qty: 0 | Refills: 0 | Status: DISCONTINUED | OUTPATIENT
Start: 2018-06-25 | End: 2018-06-30

## 2018-06-25 RX ORDER — KETOROLAC TROMETHAMINE 30 MG/ML
15 SYRINGE (ML) INJECTION ONCE
Qty: 0 | Refills: 0 | Status: DISCONTINUED | OUTPATIENT
Start: 2018-06-25 | End: 2018-06-25

## 2018-06-25 RX ORDER — PIPERACILLIN AND TAZOBACTAM 4; .5 G/20ML; G/20ML
3.38 INJECTION, POWDER, LYOPHILIZED, FOR SOLUTION INTRAVENOUS EVERY 8 HOURS
Qty: 0 | Refills: 0 | Status: DISCONTINUED | OUTPATIENT
Start: 2018-06-25 | End: 2018-06-29

## 2018-06-25 RX ORDER — SODIUM CHLORIDE 9 MG/ML
1000 INJECTION, SOLUTION INTRAVENOUS ONCE
Qty: 0 | Refills: 0 | Status: COMPLETED | OUTPATIENT
Start: 2018-06-25 | End: 2018-06-25

## 2018-06-25 RX ORDER — ACETAMINOPHEN 500 MG
650 TABLET ORAL ONCE
Qty: 0 | Refills: 0 | Status: COMPLETED | OUTPATIENT
Start: 2018-06-25 | End: 2018-06-25

## 2018-06-25 RX ORDER — VANCOMYCIN HCL 1 G
1000 VIAL (EA) INTRAVENOUS EVERY 8 HOURS
Qty: 0 | Refills: 0 | Status: DISCONTINUED | OUTPATIENT
Start: 2018-06-25 | End: 2018-06-29

## 2018-06-25 RX ADMIN — Medication 250 MILLIGRAM(S): at 06:50

## 2018-06-25 RX ADMIN — SODIUM CHLORIDE 2000 MILLILITER(S): 9 INJECTION, SOLUTION INTRAVENOUS at 00:53

## 2018-06-25 RX ADMIN — Medication 650 MILLIGRAM(S): at 08:32

## 2018-06-25 RX ADMIN — PIPERACILLIN AND TAZOBACTAM 25 GRAM(S): 4; .5 INJECTION, POWDER, LYOPHILIZED, FOR SOLUTION INTRAVENOUS at 22:04

## 2018-06-25 RX ADMIN — SODIUM CHLORIDE 1000 MILLILITER(S): 9 INJECTION, SOLUTION INTRAVENOUS at 21:55

## 2018-06-25 RX ADMIN — SODIUM CHLORIDE 125 MILLILITER(S): 9 INJECTION, SOLUTION INTRAVENOUS at 20:47

## 2018-06-25 RX ADMIN — HYDROMORPHONE HYDROCHLORIDE 1 MILLIGRAM(S): 2 INJECTION INTRAMUSCULAR; INTRAVENOUS; SUBCUTANEOUS at 18:20

## 2018-06-25 RX ADMIN — Medication 15 MILLIGRAM(S): at 11:29

## 2018-06-25 RX ADMIN — HYDROMORPHONE HYDROCHLORIDE 1 MILLIGRAM(S): 2 INJECTION INTRAMUSCULAR; INTRAVENOUS; SUBCUTANEOUS at 08:04

## 2018-06-25 RX ADMIN — LOSARTAN POTASSIUM 25 MILLIGRAM(S): 100 TABLET, FILM COATED ORAL at 06:50

## 2018-06-25 RX ADMIN — Medication 650 MILLIGRAM(S): at 19:20

## 2018-06-25 RX ADMIN — Medication 250 MILLIGRAM(S): at 18:24

## 2018-06-25 RX ADMIN — PANTOPRAZOLE SODIUM 40 MILLIGRAM(S): 20 TABLET, DELAYED RELEASE ORAL at 06:50

## 2018-06-25 RX ADMIN — Medication 650 MILLIGRAM(S): at 00:55

## 2018-06-25 RX ADMIN — HYDROMORPHONE HYDROCHLORIDE 1 MILLIGRAM(S): 2 INJECTION INTRAMUSCULAR; INTRAVENOUS; SUBCUTANEOUS at 08:25

## 2018-06-25 RX ADMIN — Medication 15 MILLIGRAM(S): at 12:54

## 2018-06-25 RX ADMIN — Medication 250 MILLIGRAM(S): at 14:23

## 2018-06-25 RX ADMIN — HYDROMORPHONE HYDROCHLORIDE 1 MILLIGRAM(S): 2 INJECTION INTRAMUSCULAR; INTRAVENOUS; SUBCUTANEOUS at 18:40

## 2018-06-25 RX ADMIN — Medication 250 MILLIGRAM(S): at 22:57

## 2018-06-25 RX ADMIN — PIPERACILLIN AND TAZOBACTAM 25 GRAM(S): 4; .5 INJECTION, POWDER, LYOPHILIZED, FOR SOLUTION INTRAVENOUS at 14:23

## 2018-06-25 RX ADMIN — SODIUM CHLORIDE 125 MILLILITER(S): 9 INJECTION, SOLUTION INTRAVENOUS at 23:04

## 2018-06-25 NOTE — CONSULT NOTE ADULT - PROBLEM SELECTOR RECOMMENDATION 9
Agree with NPO  ( very tender on exam),   IV invanz  recheck lactate and cbc, cmp in am  colonoscopy in 8 weeks
- will d/c Ertapenem  - start Zosyn 3.375g Q8H  add vancomycin for enterococcus coverage

## 2018-06-25 NOTE — PROGRESS NOTE ADULT - SUBJECTIVE AND OBJECTIVE BOX
Patient is a 52y old  Male who presents with a chief complaint of abdominal pain (23 Jun 2018 12:08)      HPI:  51 yo male with HTN , chronic low back pain.  Patient with fever 103 yesterday. Code sepsis called. Repeat Ct now shows diverticulitis  with multiple  abscesses. Abdominal pain is cramping, constant, 6/10  across the lower abdomen.     REVIEW OF SYSTEMS:  Constitutional: No fever, weight loss or fatigue  ENMT:  No difficulty hearing, tinnitus, vertigo; No sinus or throat pain  Respiratory: No cough, wheezing, chills or hemoptysis  Cardiovascular: No chest pain, palpitations, dizziness or leg swelling  Gastrointestinal: + abdominal pain. No nausea, vomiting or hematemesis; No diarrhea or constipation. No melena or hematochezia.  Skin: No itching, burning, rashes or lesions   Musculoskeletal: No joint pain or swelling; No muscle, back or extremity pain    PAST MEDICAL & SURGICAL HISTORY:  Essential hypertension  Herniated cervical disc: with myelopathy into hands  H/O hernia repair      FAMILY HISTORY:  No pertinent family history in first degree relatives      SOCIAL HISTORY:  Smoking Status: [ ] Current, [ ] Former, [ ] Never  Pack Years:  [  ] EtOH-no  [  ] IVDA    MEDICATIONS:  MEDICATIONS  (STANDING):  enoxaparin Injectable 40 milliGRAM(s) SubCutaneous daily  ertapenem  IVPB 1000 milliGRAM(s) IV Intermittent every 24 hours  lactated ringers. 1000 milliLiter(s) (125 mL/Hr) IV Continuous <Continuous>  losartan 25 milliGRAM(s) Oral daily  pantoprazole    Tablet 40 milliGRAM(s) Oral before breakfast  saccharomyces boulardii 250 milliGRAM(s) Oral two times a day    MEDICATIONS  (PRN):  HYDROmorphone  Injectable 1 milliGRAM(s) IV Push every 4 hours PRN Severe Pain (7 - 10)  morphine  - Injectable 2 milliGRAM(s) IV Push every 4 hours PRN Moderate Pain (4 - 6)  ondansetron Injectable 4 milliGRAM(s) IV Push every 4 hours PRN Nausea and/or Vomiting      Allergies    No Known Allergies    Intolerances        Vital Signs Last 24 Hrs  T(C): 37.3 (25 Jun 2018 04:50), Max: 39.6 (25 Jun 2018 00:32)  T(F): 99.1 (25 Jun 2018 04:50), Max: 103.3 (25 Jun 2018 00:32)  HR: 89 (25 Jun 2018 04:50) (89 - 110)  BP: 130/90 (25 Jun 2018 04:50) (130/90 - 152/96)  BP(mean): --  RR: 20 (25 Jun 2018 04:50) (18 - 28)  SpO2: 94% (25 Jun 2018 04:50) (91% - 94%)    06-24 @ 07:01  -  06-25 @ 07:00  --------------------------------------------------------  IN: 875 mL / OUT: 0 mL / NET: 875 mL          PHYSICAL EXAM:    General: appears uncomfortable  HEENT: MMM, conjunctiva and sclera clear  H- RRR  L- CTA  Gastrointestinal: Soft, + diffuse tenderness on palpation of the lower abdomen.  non-distended; Normal bowel sounds; No rebound or guarding  Extremities: Normal range of motion, No clubbing, cyanosis or edema  Neurological: Alert and oriented x3  Skin: Warm and dry. No obvious rash      LABS:                        13.3   16.6  )-----------( 296      ( 25 Jun 2018 00:51 )             38.8     25 Jun 2018 00:51    132    |  94     |  13.0   ----------------------------<  103    4.1     |  23.0   |  1.02     Ca    8.9        25 Jun 2018 00:51    TPro  6.9    /  Alb  3.5    /  TBili  0.9    /  DBili  x      /  AST  <5     /  ALT  9      /  AlkPhos  60     / Amylase x      /Lipase x      25 Jun 2018 00:51              RADIOLOGY & ADDITIONAL STUDIES:     < from: CT Abdomen and Pelvis w/ IV Cont (06.25.18 @ 01:58) >  MPRESSION:   1. There is extensive inflammatory disease compatible with acute   diverticulitis   involving the rectosigmoid with extensive pericolonic mesenteric   infiltration   and increasing fluid since prior study 6-23-18. The inflammatory process   appears to have worsened to some degree since 6-23-18. There is now a   larger   more well-defined fluid collection noted anteriorto the rectum on axial   image   134 of series 2 and midline sagittal image 75 of series 4 measuring 5.4   cm in   transverse dimension. The possibility of an abscess should be considered.   There   is also an enlarging fluid collection on the right in the upper right   pelvis   seen on image 122 of series 2 measuring up to 2.6 cm. This is larger   compared   to the prior study of 6-23-18 and may represent an additional forming   abscess.     2. Extensive mesenteric infiltration is noted throughout. There may be   also   some slight increase in fluid extending into the lower abdomen   particularly on   the right side. This fluid and mesenteric infiltration obscures the   anticipated   location of the appendix.     3. Continued airspace disease/atelectasis right lung base appears to have   improved to some degree since prior study.          < end of copied text >

## 2018-06-25 NOTE — CONSULT NOTE ADULT - ATTENDING COMMENTS
Patient seen and examined at 7am.  on exam no toxic, abdomen obese, soft, tender LLQ suprapubic area  CT's  reviewed. has progresses to abscess.    plan to transfer to Surgery service,   will discuss IR drainage.  patient aware might require intervention.

## 2018-06-25 NOTE — BRIEF OPERATIVE NOTE - PROCEDURE
<<-----Click on this checkbox to enter Procedure Computed tomography guidance for abscess drainage  06/25/2018    Active  HALPER

## 2018-06-25 NOTE — CONSULT NOTE ADULT - ASSESSMENT
This is a 53 yo with HTN, cervical radiculopathy, admitted to the medical service on 7/23/18 for uncomplicated sigmoid diverticulitis.   Code sepsis overnight due to fever, tachycardia  Now with Hinchey II diverticulitis  -Patient will be transferred to ACS service, d/w Dr. Begum who agrees  -Continue IV antibiotics  -Will d/w IR for possible drainage of pelvic abscess  -DVT ppx  -Given size of pelvic abscess, multiple abscesses, male gender, there is risk of failure to respond to medical managment; we discussed with the patient that we would attempt medical manangement +/- IR drainage, but would require surgery if fails to improve clinically or clinically deteriorates. Patient verbalized understanding and agreement with plan.

## 2018-06-25 NOTE — CONSULT NOTE ADULT - ASSESSMENT
This 52 y.o. man with sigmoid diverticulitis, found with collection anterior to the rectum (measuring 6.2 x 4.5 cm) and right hemipelvis (measuring 3.4 x 2.3 cm), still with intermittent fevers.

## 2018-06-25 NOTE — PROGRESS NOTE ADULT - PROBLEM SELECTOR PLAN 1
surgery consult appreciated , to take pt over to their service   may need IR drainage of abscess , cont iv abx, follow up cultures result   GI follow up   pain meds, NPO

## 2018-06-25 NOTE — PROGRESS NOTE ADULT - ASSESSMENT
53 yo male obese with h/o HTN admitted for sepsis , acute diverticulitis with recurrent fever worsening leukocytosis, repaet CT scan of abdomen. There is extensive inflammatory disease compatible with acute   diverticulitis   involving the rectosigmoid with extensive pericolonic mesenteric   infiltration   and increasing fluid since prior study 6-23-18. The inflammatory process   appears to have worsened to some degree since 6-23-18. There is now a   larger   more well-defined fluid collection noted anteriorto the rectum on axial   image   134 of series 2 and midline sagittal image 75 of series 4 measuring 5.4   cm in   transverse dimension. The possibility of an abscess should be considered.

## 2018-06-25 NOTE — PROGRESS NOTE ADULT - SUBJECTIVE AND OBJECTIVE BOX
Patient is a 52y old  Male who presents with a chief complaint of abdominal pain   last night events noted , recurrent fever code sepsis called . CT of abdomen repeated new collection   surgery consulted by rapid response team , Got call from surgery team they will take pt under their service   Pt is still with severe abdominal pain in the RLQ controlled with pain meds intermittent   Allergies    No Known Allergies    Intolerances        Vital Signs Last 24 Hrs  T(C): 37.3 (25 Jun 2018 04:50), Max: 39.6 (25 Jun 2018 00:32)  T(F): 99.1 (25 Jun 2018 04:50), Max: 103.3 (25 Jun 2018 00:32)  HR: 89 (25 Jun 2018 04:50) (89 - 110)  BP: 130/90 (25 Jun 2018 04:50) (130/90 - 152/96)  BP(mean): --  RR: 20 (25 Jun 2018 04:50) (18 - 28)  SpO2: 94% (25 Jun 2018 04:50) (91% - 94%)PHYSICAL EXAM:    GENERAL: NAD, obes lying in the bed   CHEST/LUNG: Clear to auscultation  bilaterally; No rales, rhonchi, wheezing   HEART: Regular rate and rhythm; S1 /S2  No murmurs, rubs  ABDOMEN: Soft, RLQ tenderness on palpation , + rebound Nondistended; Bowel sounds present  EXTREMITIES:  No clubbing, cyanosis, or edema      LABS:                                    13.3   16.6  )-----------( 296      ( 25 Jun 2018 00:51 )             38.8   06-25    132<L>  |  94<L>  |  13.0  ----------------------------<  103  4.1   |  23.0  |  1.02    Ca    8.9      25 Jun 2018 00:51    TPro  6.9  /  Alb  3.5  /  TBili  0.9  /  DBili  x   /  AST  <5  /  ALT  9   /  AlkPhos  60  06-25

## 2018-06-25 NOTE — CONSULT NOTE ADULT - SUBJECTIVE AND OBJECTIVE BOX
This is a 53 yo M with HTN, cervical radiculopathy, admitted to the medical service on 7/23/18 for uncomplicated sigmoid diverticulitis. He was kept NPO, started on Cipro/Flagyl IV. Yesterday overnight, patient had worsening abdominal pain, code sepsis was called due to fever of 102F and tachycardia. Repeat CT was performed, showed new development of pericolonic small abscess and 4m x 5cm abscess in the pelvis anterior to the rectum.     Patient c/o pain to lower abdomen, sharp in nature, 9/10, constant, worst with palpation. Denies prior similar episodes. No prior hx of colonoscopy or hx of colon cancer in family.       PAST MEDICAL & SURGICAL HISTORY:  Essential hypertension  Herniated cervical disc: with myelopathy into hands  H/O hernia repair      Review of systems: all systems reviewed, negative except as stated above    MEDICATIONS  (STANDING):  ertapenem  IVPB 1000 milliGRAM(s) IV Intermittent every 24 hours  lactated ringers. 1000 milliLiter(s) (125 mL/Hr) IV Continuous <Continuous>  losartan 25 milliGRAM(s) Oral daily  pantoprazole    Tablet 40 milliGRAM(s) Oral before breakfast  saccharomyces boulardii 250 milliGRAM(s) Oral two times a day    MEDICATIONS  (PRN):  acetaminophen   Tablet 650 milliGRAM(s) Oral every 6 hours PRN For Temp greater than 38 C (100.4 F)  HYDROmorphone  Injectable 1 milliGRAM(s) IV Push every 4 hours PRN Severe Pain (7 - 10)  morphine  - Injectable 2 milliGRAM(s) IV Push every 4 hours PRN Moderate Pain (4 - 6)  ondansetron Injectable 4 milliGRAM(s) IV Push every 4 hours PRN Nausea and/or Vomiting      Allergies    No Known Allergies    Intolerances        SOCIAL HISTORY: denies toxic habits x3    FAMILY HISTORY:  No pertinent family history in first degree relatives      Vital Signs Last 24 Hrs  T(C): 37.8 (25 Jun 2018 07:31), Max: 39.6 (25 Jun 2018 00:32)  T(F): 100 (25 Jun 2018 07:31), Max: 103.3 (25 Jun 2018 00:32)  HR: 95 (25 Jun 2018 07:31) (89 - 110)  BP: 123/83 (25 Jun 2018 07:31) (123/83 - 152/96)  BP(mean): --  RR: 20 (25 Jun 2018 07:31) (18 - 28)  SpO2: 93% (25 Jun 2018 07:31) (91% - 94%)    GEN: AAOx3, mild distress due to pain   Pulm: CTAB  CV: RRR, S1/S2  Abd: S, mildly distended, TTP RLQ/LLQ, no rebound tenderness   Ext: FROMx4  Vasc: +2 pulses all throughout  Neuro: grossly intact    LABS:                        13.3   16.6  )-----------( 296      ( 25 Jun 2018 00:51 )             38.8     06-25    132<L>  |  94<L>  |  13.0  ----------------------------<  103  4.1   |  23.0  |  1.02    Ca    8.9      25 Jun 2018 00:51    TPro  6.9  /  Alb  3.5  /  TBili  0.9  /  DBili  x   /  AST  <5  /  ALT  9   /  AlkPhos  60  06-25    PT/INR - ( 25 Jun 2018 00:50 )   PT: 13.3 sec;   INR: 1.20 ratio         PTT - ( 25 Jun 2018 00:50 )  PTT:28.3 sec      RADIOLOGY & ADDITIONAL STUDIES:

## 2018-06-25 NOTE — CHART NOTE - NSCHARTNOTEFT_GEN_A_CORE
Rapid Response PGY 1/ PGY 3 Note    [id number]  KAYLEE DAHL    Code sepsis was called on a 52y year old Male patient for temp 102F and tachycardia  Patient has a past medical history of HTN, admitted for acute diverticulitis    Patient was seen and examined at the bedside by the rapid response team.    Allergies  No Known Allergies    PAST MEDICAL & SURGICAL HISTORY:  Essential hypertension  Herniated cervical disc: with myelopathy into hands  H/O hernia repair      Vital Signs Last 24 Hrs  T(C): 39.6 (2018 00:32), Max: 39.6 (2018 00:32)  T(F): 103.3 (2018 00:32), Max: 103.3 (2018 00:32)  HR: 110 (2018 00:32) (91 - 110)  BP: 152/96 (2018 00:32) (136/93 - 152/96)  RR: 28 (2018 00:32) (18 - 28)  SpO2: 91% (2018 00:32) (91% - 94%)          PHYSICAL EXAM:    GENERAL: Awake, NAD, well-groomed, well-developed  HEAD:  Atraumatic, Normocephalic  EYES: EOMI, PERRLA, conjunctiva and sclera clear  ENMT: Moist mucous membranes, Good dentition, No lesions  NECK: Supple, No JVD, Normal thyroid  NERVOUS SYSTEM:  Alert & Oriented X3, Good concentration; Motor Strength 5/5 B/L upper and lower extremities; DTRs 2+ intact and symmetric  CHEST/LUNG: No rales, mild inspiratory occasional fine crackles on right base, no wheezing, or rubs  HEART: Regular rate and rhythm; No murmurs, rubs, or gallops  ABDOMEN: Soft, tender on RLQ and RUQ, Nondistended; Bowel sounds present  EXTREMITIES:  2+ Peripheral Pulses, No clubbing, cyanosis, or edema  SKIN: No rashes or lesions       @ :  -   @ 07:00  --------------------------------------------------------  IN: 1500 mL / OUT: 0 mL / NET: 1500 mL     @ 07:  -   @ 00:55  --------------------------------------------------------  IN: 875 mL / OUT: 0 mL / NET: 875 mL                              13.3   14.2  )-----------( 300      ( 2018 05:39 )             40.0         136  |  100  |  14.0  ----------------------------<  96  4.5   |  22.0  |  1.04    Ca    8.8      2018 05:39  Mg     1.5         TPro  8.0  /  Alb  4.4  /  TBili  0.4  /  DBili  x   /  AST  17  /  ALT  19  /  AlkPhos  78  06-23         LIVER FUNCTIONS - ( 2018 01:02 )  Alb: 4.4 g/dL / Pro: 8.0 g/dL / ALK PHOS: 78 U/L / ALT: 19 U/L / AST: 17 U/L / GGT: x         Urinalysis Basic - ( 2018 04:10 )    Color: Yellow / Appearance: Clear / S.015 / pH: x  Gluc: x / Ketone: Negative  / Bili: Negative / Urobili: Negative mg/dL   Blood: x / Protein: 30 mg/dL / Nitrite: Negative   Leuk Esterase: Negative / RBC: x / WBC 0-2   Sq Epi: x / Non Sq Epi: x / Bacteria: Occasional         PT/INR - ( 2018 01:02 )   PT: 11.5 sec;   INR: 1.04 ratio         PTT - ( 2018 01:02 )  PTT:26.7 sec    Vital Signs Last 24 Hrs*       Assessment-Code sepsis called for 52y year old Male with a past medical history of HTN admitted for acute diverticulitis, for fever and tachycardia    Plan- Rapid Response PGY 1/ PGY 3 Note    [id number]  KAYLEE DAHL    Code sepsis was called on a 52y year old Male patient for temp 102F and tachycardia  Patient has a past medical history of HTN, admitted for acute diverticulitis    Patient was seen and examined at the bedside by the rapid response team.    Allergies  No Known Allergies    PAST MEDICAL & SURGICAL HISTORY:  Essential hypertension  Herniated cervical disc: with myelopathy into hands  H/O hernia repair      Vital Signs Last 24 Hrs  T(C): 39.6 (2018 00:32), Max: 39.6 (2018 00:32)  T(F): 103.3 (2018 00:32), Max: 103.3 (2018 00:32)  HR: 110 (2018 00:32) (91 - 110)  BP: 152/96 (2018 00:32) (136/93 - 152/96)  RR: 28 (2018 00:32) (18 - 28)  SpO2: 91% (2018 00:32) (91% - 94%)          PHYSICAL EXAM:    GENERAL: Awake, NAD, well-groomed, well-developed  HEAD:  Atraumatic, Normocephalic  EYES: EOMI, PERRLA, conjunctiva and sclera clear  ENMT: Moist mucous membranes, Good dentition, No lesions  NECK: Supple, No JVD, Normal thyroid  NERVOUS SYSTEM:  Alert & Oriented X3, Good concentration; Motor Strength 5/5 B/L upper and lower extremities; DTRs 2+ intact and symmetric  CHEST/LUNG: No rales, mild inspiratory occasional fine crackles on right base, no wheezing, or rubs  HEART: Regular rate and rhythm; No murmurs, rubs, or gallops  ABDOMEN: Soft, mildly tender on RLQ and RUQ, Nondistended; Bowel sounds present  EXTREMITIES:  2+ Peripheral Pulses, No clubbing, cyanosis, or edema  SKIN: No rashes or lesions       @ :  -   @ 07:00  --------------------------------------------------------  IN: 1500 mL / OUT: 0 mL / NET: 1500 mL     @ 07: @ 00:55  --------------------------------------------------------  IN: 875 mL / OUT: 0 mL / NET: 875 mL                              13.3   14.2  )-----------( 300      ( 2018 05:39 )             40.0     06    136  |  100  |  14.0  ----------------------------<  96  4.5   |  22.0  |  1.04    Ca    8.8      2018 05:39  Mg     1.5         TPro  8.0  /  Alb  4.4  /  TBili  0.4  /  DBili  x   /  AST  17  /  ALT  19  /  AlkPhos  78  06-23         LIVER FUNCTIONS - ( 2018 01:02 )  Alb: 4.4 g/dL / Pro: 8.0 g/dL / ALK PHOS: 78 U/L / ALT: 19 U/L / AST: 17 U/L / GGT: x         Urinalysis Basic - ( 2018 04:10 )    Color: Yellow / Appearance: Clear / S.015 / pH: x  Gluc: x / Ketone: Negative  / Bili: Negative / Urobili: Negative mg/dL   Blood: x / Protein: 30 mg/dL / Nitrite: Negative   Leuk Esterase: Negative / RBC: x / WBC 0-2   Sq Epi: x / Non Sq Epi: x / Bacteria: Occasional         PT/INR - ( 2018 01:02 )   PT: 11.5 sec;   INR: 1.04 ratio         PTT - ( 2018 01:02 )  PTT:26.7 sec    Vital Signs Last 24 Hrs*       Assessment-Code sepsis called for 52y year old Male with a past medical history of HTN admitted for acute diverticulitis, for fever and tachycardia    Plan-cbc, cmp, blood cultures X2, urinalysis, urine cx, lactate, procalcitonin  initiated 30 cc/kg but switched to 125 cc per hour with normal lactate  Lactate =1  chest xray showing atelectasis, no consolidations    Patient currently on invanz.  Will repeat ct scan of the abdomen to rule out abscess given persistent fevers during admission    Case discussed with Dr. Mccartney and agrees with the plan      PGY 1/3 note [facility]/ [facility] Rapid Response PGY 1/ PGY 3 Note    [id number]  KAYLEE KEITH    Code sepsis was called on a 52y year old Male patient for temp 102F and tachycardia  Patient has a past medical history of HTN, admitted for acute diverticulitis    Patient was seen and examined at the bedside by the rapid response team.    Allergies  No Known Allergies    PAST MEDICAL & SURGICAL HISTORY:  Essential hypertension  Herniated cervical disc: with myelopathy into hands  H/O hernia repair      Vital During Code Sepsis  T(C): 39.6 (2018 00:32), Max: 39.6 (2018 00:32)  T(F): 103.3 (2018 00:32), Max: 103.3 (2018 00:32)  HR: 110 (2018 00:32) (91 - 110)  BP: 152/96 (2018 00:32) (136/93 - 152/96)  RR: 28 (2018 00:32) (18 - 28)  SpO2: 91% (2018 00:32) (91% - 94%)          PHYSICAL EXAM:    GENERAL: Awake, NAD, well-groomed, well-developed  HEAD:  Atraumatic, Normocephalic  EYES: EOMI, PERRLA, conjunctiva and sclera clear  ENMT: Moist mucous membranes, Good dentition, No lesions  NECK: Supple, No JVD, Normal thyroid  NERVOUS SYSTEM:  Alert & Oriented X3, Good concentration; Motor Strength 5/5 B/L upper and lower extremities; DTRs 2+ intact and symmetric  CHEST/LUNG: No rales, mild inspiratory occasional fine crackles on right base, no wheezing, or rubs  HEART: Regular rate and rhythm; No murmurs, rubs, or gallops  ABDOMEN: Soft, mildly tender on RLQ and RUQ, Nondistended; Bowel sounds present  EXTREMITIES:  2+ Peripheral Pulses, No clubbing, cyanosis, or edema  SKIN: No rashes or lesions       @ :  -   @ 07:00  --------------------------------------------------------  IN: 1500 mL / OUT: 0 mL / NET: 1500 mL     @ 07:  -   @ 00:55  --------------------------------------------------------  IN: 875 mL / OUT: 0 mL / NET: 875 mL                              13.3   14.2  )-----------( 300      ( 2018 05:39 )             40.0     06    136  |  100  |  14.0  ----------------------------<  96  4.5   |  22.0  |  1.04    Ca    8.8      2018 05:39  Mg     1.5         TPro  8.0  /  Alb  4.4  /  TBili  0.4  /  DBili  x   /  AST  17  /  ALT  19  /  AlkPhos  78  06-         LIVER FUNCTIONS - ( 2018 01:02 )  Alb: 4.4 g/dL / Pro: 8.0 g/dL / ALK PHOS: 78 U/L / ALT: 19 U/L / AST: 17 U/L / GGT: x         Urinalysis Basic - ( 2018 04:10 )    Color: Yellow / Appearance: Clear / S.015 / pH: x  Gluc: x / Ketone: Negative  / Bili: Negative / Urobili: Negative mg/dL   Blood: x / Protein: 30 mg/dL / Nitrite: Negative   Leuk Esterase: Negative / RBC: x / WBC 0-2   Sq Epi: x / Non Sq Epi: x / Bacteria: Occasional         PT/INR - ( 2018 01:02 )   PT: 11.5 sec;   INR: 1.04 ratio         PTT - ( 2018 01:02 )  PTT:26.7 sec    Vital Signs Last 24 Hrs*       Assessment-Code sepsis called for 52y year old Male with a past medical history of HTN admitted for acute diverticulitis, for fever and tachycardia    Plan-cbc, cmp, blood cultures X2, urinalysis, urine cx, lactate, procalcitonin, EKG  initiated 30 cc/kg but switched to 125 cc per hour with normal lactate  Lactate =1  chest xray showing atelectasis, no consolidations    Tylenol 650 po given for rectal temp    Patient currently on invanz.  Will repeat ct scan of the abdomen to rule out abscess given persistent fevers during admission    Case discussed with Dr. Mccartney and agrees with the plan      PGY 1/3 note [facility] Rapid Response PGY 1/ PGY 3 Note    [id number]  KAYLEE KEITH    Code sepsis was called on a 52y year old Male patient for temp 102F and tachycardia  Patient has a past medical history of HTN, admitted for acute diverticulitis    Patient was seen and examined at the bedside by the rapid response team.    Allergies  No Known Allergies    PAST MEDICAL & SURGICAL HISTORY:  Essential hypertension  Herniated cervical disc: with myelopathy into hands  H/O hernia repair      Vital During Code Sepsis  T(C): 39.6 (2018 00:32), Max: 39.6 (2018 00:32)  T(F): 103.3 (2018 00:32), Max: 103.3 (2018 00:32)  HR: 110 (2018 00:32) (91 - 110)  BP: 152/96 (2018 00:32) (136/93 - 152/96)  RR: 28 (2018 00:32) (18 - 28)  SpO2: 91% (2018 00:32) (91% - 94%)          PHYSICAL EXAM:    GENERAL: Awake, NAD, well-groomed, well-developed  HEAD:  Atraumatic, Normocephalic  EYES: EOMI, PERRLA, conjunctiva and sclera clear  ENMT: Moist mucous membranes, Good dentition, No lesions  NECK: Supple, No JVD, Normal thyroid  NERVOUS SYSTEM:  Alert & Oriented X3, Good concentration; Motor Strength 5/5 B/L upper and lower extremities; DTRs 2+ intact and symmetric  CHEST/LUNG: No rales, mild inspiratory occasional fine crackles on right base, no wheezing, or rubs  HEART: Regular rate and rhythm; No murmurs, rubs, or gallops  ABDOMEN: Soft, mildly tender on RLQ and RUQ, Nondistended; Bowel sounds present  EXTREMITIES:  2+ Peripheral Pulses, No clubbing, cyanosis, or edema  SKIN: No rashes or lesions       @ :  -   @ 07:00  --------------------------------------------------------  IN: 1500 mL / OUT: 0 mL / NET: 1500 mL     @ 07:  -   @ 00:55  --------------------------------------------------------  IN: 875 mL / OUT: 0 mL / NET: 875 mL                              13.3   14.2  )-----------( 300      ( 2018 05:39 )             40.0     06    136  |  100  |  14.0  ----------------------------<  96  4.5   |  22.0  |  1.04    Ca    8.8      2018 05:39  Mg     1.5         TPro  8.0  /  Alb  4.4  /  TBili  0.4  /  DBili  x   /  AST  17  /  ALT  19  /  AlkPhos  78  06-         LIVER FUNCTIONS - ( 2018 01:02 )  Alb: 4.4 g/dL / Pro: 8.0 g/dL / ALK PHOS: 78 U/L / ALT: 19 U/L / AST: 17 U/L / GGT: x         Urinalysis Basic - ( 2018 04:10 )    Color: Yellow / Appearance: Clear / S.015 / pH: x  Gluc: x / Ketone: Negative  / Bili: Negative / Urobili: Negative mg/dL   Blood: x / Protein: 30 mg/dL / Nitrite: Negative   Leuk Esterase: Negative / RBC: x / WBC 0-2   Sq Epi: x / Non Sq Epi: x / Bacteria: Occasional         PT/INR - ( 2018 01:02 )   PT: 11.5 sec;   INR: 1.04 ratio         PTT - ( 2018 01:02 )  PTT:26.7 sec    Vital Signs Last 24 Hrs*       Assessment-Code sepsis called for 52y year old Male with a past medical history of HTN admitted for acute diverticulitis, for fever and tachycardia    Plan-cbc, cmp, blood cultures X2, urinalysis, urine cx, lactate, procalcitonin, EKG  initiated 30 cc/kg but switched to 125 cc per hour with normal lactate  Lactate =1  chest xray showing atelectasis, no consolidations  Tylenol 650 po given for rectal temp    Patient currently on invanz.  Will repeat ct scan of the abdomen to rule out abscess given persistent fevers during admission    Case discussed with Dr. Mccartney and agrees with the plan      PGY 1/3 note [facility]/ [facility]        Addendum:  As epr CT scan report, patient with fluid collection in pelvis and mural abscess.  Surgery team called, Dr Osorio contacted and mentioned that will see the patient    Discussed with SRSalvador BALDWIN      ******PRELIMINARY REPORT******         EXAM:  CT ABDOMEN AND PELVIS IC                        PROCEDURE DATE:  2018    ******PRELIMINARY REPORT******    ******PRELIMINARY REPORT******        INTERPRETATION:  Prelim:  Again seen are findings related to acute diverticulitis along the sigmoid   colon. There is a suggestion of a developing 1.7 x 1.3 x 0.8 cm mural   abscess along the sigmoid (series 2, image 127). In addition, fluid   collections with mild rim enhancement are developing in the pelvis. This   includes a 4.1 x 5.2 x 2.5 cm collection anterior to the rectum (series   2, image 136). There is also a 2.0 x 2.9 x 2.3 cm collection abutting an   ileal loop within the pelvis to the right of midline (series 2, image   121). Infected collections are possible. No free air is seen.  Fluid contents along portions of the colon. Correlate for diarrhea.  Linear atelectasis at the right base.  Follow-up final report  ******PRELIMINARY REPORT******    ******PRELIMINARY REPORT******        GENIE SMITH M.D., ATTENDING RADIOLOGIST

## 2018-06-25 NOTE — CONSULT NOTE ADULT - SUBJECTIVE AND OBJECTIVE BOX
[facility]  INFECTIOUS DISEASES AND INTERNAL MEDICINE at [place]  =======================================================  Andres Lino MD  Diplomates American Board of Internal Medicine and Infectious Diseases  =======================================================    [id number]  KAYLEE KEITH   This is a 52y  Male with HTN, cervical radiculopathy, admitted to the medical service on 6/23/18 for uncomplicated sigmoid diverticulitis. He was kept NPO, started on Cipro/Flagyl IV in the ER.  He was then changed to Ertapenem by the admitting team.   His course was significant for worsening abdominal pain, code sepsis was called due to fever of 102F and tachycardia. Repeat CT was performed, showed new development of pericolonic small abscess and 4m x 5cm abscess in the pelvis anterior to the rectum.   Patient had no history of diverticulitis, he has no prior colonoscopy done.     pt still with a lot of pain in the left lower and lower mid abdomen.   He is NPH    =======================================================  Past Medical & Surgical Hx:  =====================  PAST MEDICAL & SURGICAL HISTORY:  Essential hypertension  Herniated cervical disc: with myelopathy into hands  H/O hernia repair  Pre-diabetes    Problem List:  ==========  HEALTH ISSUES - PROBLEM Dx:  Diverticulitis of large intestine with abscess without bleeding: Diverticulitis of large intestine with abscess without bleeding  Sepsis due to other etiology: Sepsis due to other etiology  Essential hypertension: Essential hypertension  Acute diverticulitis: Acute diverticulitis    Social Hx:  =======  no toxic habits currently  smokes 1ppd x 20 years  no drugs  works in [employer].     FAMILY HISTORY:  No pertinent family history in first degree relatives  no significant family history of immunosuppressive disorders.  M- CAD  F- COD      Allergies    No Known Allergies    Intolerances        MEDICATIONS  (STANDING):  ertapenem  IVPB 1000 milliGRAM(s) IV Intermittent every 24 hours  lactated ringers. 1000 milliLiter(s) (125 mL/Hr) IV Continuous <Continuous>  losartan 25 milliGRAM(s) Oral daily  pantoprazole    Tablet 40 milliGRAM(s) Oral before breakfast  saccharomyces boulardii 250 milliGRAM(s) Oral two times a day    MEDICATIONS  (PRN):  acetaminophen   Tablet 650 milliGRAM(s) Oral every 6 hours PRN For Temp greater than 38 C (100.4 F)  HYDROmorphone  Injectable 1 milliGRAM(s) IV Push every 4 hours PRN Severe Pain (7 - 10)  morphine  - Injectable 2 milliGRAM(s) IV Push every 4 hours PRN Moderate Pain (4 - 6)  ondansetron Injectable 4 milliGRAM(s) IV Push every 4 hours PRN Nausea and/or Vomiting        =======================================================  REVIEW OF SYSTEMS:  as above  all other ROS negative    ======================================================  Physical Exam:  ============  Vital Signs Last 24 Hrs  T(C): 37.8 (25 Jun 2018 07:31), Max: 39.6 (25 Jun 2018 00:32)  T(F): 100 (25 Jun 2018 07:31), Max: 103.3 (25 Jun 2018 00:32)  HR: 95 (25 Jun 2018 07:31) (89 - 110)  BP: 123/83 (25 Jun 2018 07:31) (123/83 - 152/96)  RR: 20 (25 Jun 2018 07:31) (18 - 28)  SpO2: 93% (25 Jun 2018 07:31) (91% - 94%)      General:  No acute distress.  Eye: Pupils are equal, round and reactive to light, Extraocular movements are intact, Normal conjunctiva.  HENT: Normocephalic, Oral mucosa is moist, No pharyngeal erythema, No sinus tenderness.  Neck: Supple, No lymphadenopathy.  Respiratory: Lungs are clear to auscultation, Respirations are non-labored.  Cardiovascular: Normal rate, Regular rhythm, No murmur, Good pulses equal in all extremities, No edema.  Gastrointestinal: Soft, Non-tender, Non-distended, Normal bowel sounds.  Genitourinary: No costovertebral angle tenderness.  Lymphatics: No lymphadenopathy neck,   Musculoskeletal: Normal range of motion, Normal strength.  Integumentary: No rash.  Neurologic: Alert, Oriented, No focal deficits, Cranial Nerves II-XII are grossly intact.  Psychiatric: Appropriate mood & affect.      =======================================================  Labs:  ====  06-25    132<L>  |  94<L>  |  13.0  ----------------------------<  103  4.1   |  23.0  |  1.02    Ca    8.9      25 Jun 2018 00:51    TPro  6.9  /  Alb  3.5  /  TBili  0.9  /  DBili  x   /  AST  <5  /  ALT  9   /  AlkPhos  60  06-25                          13.3   16.6  )-----------( 296      ( 25 Jun 2018 00:51 )             38.8       PT/INR - ( 25 Jun 2018 00:50 )   PT: 13.3 sec;   INR: 1.20 ratio         PTT - ( 25 Jun 2018 00:50 )  PTT:28.3 sec    LIVER FUNCTIONS - ( 25 Jun 2018 00:51 )  Alb: 3.5 g/dL / Pro: 6.9 g/dL / ALK PHOS: 60 U/L / ALT: 9 U/L / AST: <5 U/L / GGT: x           RECENT CULTURES:  06-23 @ 04:12 .Urine Clean Catch (Midstream)     <10,000 CFU/ml Gram Positive Cocci in Pairs and Chains    06-23 @ 01:06 .Blood Blood-Peripheral     No growth at 48 hours    06-23 @ 01:05 .Blood Blood-Peripheral     No growth at 48 hours    =-=======================     EXAM:  CT ABDOMEN AND PELVIS IC                          PROCEDURE DATE:  06/25/2018          INTERPRETATION:  CLINICAL INFORMATION: Abdominal pain today, code sepsis.    COMPARISON: June 23, 2018.    PROCEDURE:   CT of the Abdomen and Pelvis was performed with intravenous contrast.   Intravenous contrast: 95 mL Omnipaque 300.  Oral contrast: None.  Sagittal and coronal reformats were performed.    FINDINGS:    LOWER CHEST: Partially calcified mediastinal and hilar lymph nodes. Subsegmental atelectasis in the right lower lobe.    LIVER: Within normal limits.  BILE DUCTS: Normal caliber.   GALLBLADDER: Within normal limits.  SPLEEN: Within normal limits.  PANCREAS: Within normal limits.  ADRENALS: Tiny fat-containing lesion in the right adrenal gland, probably myelolipoma. Left adrenal gland is within normal limits.  KIDNEYS/URETERS: Nonobstructing calculus in the left lower pole, measuring 0.5 cm. Left lower pole renal cyst. Atrophic right kidney. Mild periureteral inflammation surrounding the right distal ureter.     BLADDER: Within normal limits.  REPRODUCTIVE ORGANS: The prostate is enlarged.    BOWEL: Sigmoid diverticulosis with surrounding inflammatory changes. Small fluid collection in the wall of the sigmoid colon (series 2, image 120), measuring 2.0 x 1.2 cm, probably intramural abscess. Rim-enhancing fluid collection anterior to the rectum, measuring 6.2 x 4.5 cm. Adjacent peripherally enhancing fluid collection in the right hemipelvis (series   2, image 120), measuring 3.4 x 2.3 cm. No bowel obstruction.     PERITONEUM: No ascites.  VESSELS:  Atherosclerotic change of the abdominal aorta and its branches.  LYMPH NODES: Enlarged upper abdominal and retroperitoneal lymph nodes.     For example:  *  Aortocaval node (series 2, image 73), measuring 1.4 x 1.1 cm  *  Gastrohepatic node (series 2, image 49), measuring 2.0 x 1.4 cm.  ABDOMINAL WALL: Within normal limits.  BONES: Degenerative changes of the spine.    IMPRESSION: Sigmoid diverticulitis with new intramural and pelvic fluid collections.     This case was discussed by Dr. Valdovinos [facility] radiologist with Dr Kevin  at 3:19 AM central standard time on 6-25-18.    GEORGE PISANO M.D., ATTENDING RADIOLOGIST  This document has been electronically signed. Jun 25 2018  8:56AM

## 2018-06-25 NOTE — PROGRESS NOTE ADULT - PROBLEM SELECTOR PLAN 1
Now complicated diverticulitis  with abscess  Surgery now following  Pt is NPO  On Invanz, but please call ID to see if antbiotics need to be changed ( developed abscess on INVANZ).   Blood culture pending .lactate normal

## 2018-06-26 LAB
ANION GAP SERPL CALC-SCNC: 14 MMOL/L — SIGNIFICANT CHANGE UP (ref 5–17)
BUN SERPL-MCNC: 15 MG/DL — SIGNIFICANT CHANGE UP (ref 8–20)
CALCIUM SERPL-MCNC: 8.7 MG/DL — SIGNIFICANT CHANGE UP (ref 8.6–10.2)
CHLORIDE SERPL-SCNC: 98 MMOL/L — SIGNIFICANT CHANGE UP (ref 98–107)
CO2 SERPL-SCNC: 23 MMOL/L — SIGNIFICANT CHANGE UP (ref 22–29)
CREAT SERPL-MCNC: 1.15 MG/DL — SIGNIFICANT CHANGE UP (ref 0.5–1.3)
GLUCOSE SERPL-MCNC: 112 MG/DL — SIGNIFICANT CHANGE UP (ref 70–115)
HCT VFR BLD CALC: 38.2 % — LOW (ref 42–52)
HGB BLD-MCNC: 13 G/DL — LOW (ref 14–18)
MAGNESIUM SERPL-MCNC: 1.7 MG/DL — SIGNIFICANT CHANGE UP (ref 1.6–2.6)
MCHC RBC-ENTMCNC: 29.8 PG — SIGNIFICANT CHANGE UP (ref 27–31)
MCHC RBC-ENTMCNC: 34 G/DL — SIGNIFICANT CHANGE UP (ref 32–36)
MCV RBC AUTO: 87.6 FL — SIGNIFICANT CHANGE UP (ref 80–94)
PHOSPHATE SERPL-MCNC: 3.9 MG/DL — SIGNIFICANT CHANGE UP (ref 2.4–4.7)
PLATELET # BLD AUTO: 292 K/UL — SIGNIFICANT CHANGE UP (ref 150–400)
POTASSIUM SERPL-MCNC: 3.8 MMOL/L — SIGNIFICANT CHANGE UP (ref 3.5–5.3)
POTASSIUM SERPL-SCNC: 3.8 MMOL/L — SIGNIFICANT CHANGE UP (ref 3.5–5.3)
RBC # BLD: 4.36 M/UL — LOW (ref 4.6–6.2)
RBC # FLD: 12.9 % — SIGNIFICANT CHANGE UP (ref 11–15.6)
SODIUM SERPL-SCNC: 135 MMOL/L — SIGNIFICANT CHANGE UP (ref 135–145)
VANCOMYCIN TROUGH SERPL-MCNC: 13.3 UG/ML — SIGNIFICANT CHANGE UP (ref 10–20)
WBC # BLD: 13.2 K/UL — HIGH (ref 4.8–10.8)
WBC # FLD AUTO: 13.2 K/UL — HIGH (ref 4.8–10.8)

## 2018-06-26 PROCEDURE — 99233 SBSQ HOSP IP/OBS HIGH 50: CPT

## 2018-06-26 RX ORDER — MAGNESIUM SULFATE 500 MG/ML
2 VIAL (ML) INJECTION ONCE
Qty: 0 | Refills: 0 | Status: COMPLETED | OUTPATIENT
Start: 2018-06-26 | End: 2018-06-26

## 2018-06-26 RX ORDER — ACETAMINOPHEN 500 MG
1000 TABLET ORAL ONCE
Qty: 0 | Refills: 0 | Status: COMPLETED | OUTPATIENT
Start: 2018-06-26 | End: 2018-06-26

## 2018-06-26 RX ORDER — ENOXAPARIN SODIUM 100 MG/ML
40 INJECTION SUBCUTANEOUS DAILY
Qty: 0 | Refills: 0 | Status: DISCONTINUED | OUTPATIENT
Start: 2018-06-26 | End: 2018-07-06

## 2018-06-26 RX ORDER — POTASSIUM CHLORIDE 20 MEQ
10 PACKET (EA) ORAL
Qty: 0 | Refills: 0 | Status: COMPLETED | OUTPATIENT
Start: 2018-06-26 | End: 2018-06-26

## 2018-06-26 RX ADMIN — ONDANSETRON 4 MILLIGRAM(S): 8 TABLET, FILM COATED ORAL at 18:15

## 2018-06-26 RX ADMIN — Medication 250 MILLIGRAM(S): at 05:35

## 2018-06-26 RX ADMIN — PIPERACILLIN AND TAZOBACTAM 25 GRAM(S): 4; .5 INJECTION, POWDER, LYOPHILIZED, FOR SOLUTION INTRAVENOUS at 13:23

## 2018-06-26 RX ADMIN — SODIUM CHLORIDE 125 MILLILITER(S): 9 INJECTION, SOLUTION INTRAVENOUS at 08:00

## 2018-06-26 RX ADMIN — Medication 100 MILLIEQUIVALENT(S): at 13:16

## 2018-06-26 RX ADMIN — ENOXAPARIN SODIUM 40 MILLIGRAM(S): 100 INJECTION SUBCUTANEOUS at 13:18

## 2018-06-26 RX ADMIN — Medication 250 MILLIGRAM(S): at 19:50

## 2018-06-26 RX ADMIN — Medication 400 MILLIGRAM(S): at 01:42

## 2018-06-26 RX ADMIN — Medication 250 MILLIGRAM(S): at 05:50

## 2018-06-26 RX ADMIN — PIPERACILLIN AND TAZOBACTAM 25 GRAM(S): 4; .5 INJECTION, POWDER, LYOPHILIZED, FOR SOLUTION INTRAVENOUS at 05:34

## 2018-06-26 RX ADMIN — Medication 250 MILLIGRAM(S): at 16:00

## 2018-06-26 RX ADMIN — PIPERACILLIN AND TAZOBACTAM 25 GRAM(S): 4; .5 INJECTION, POWDER, LYOPHILIZED, FOR SOLUTION INTRAVENOUS at 22:05

## 2018-06-26 RX ADMIN — LOSARTAN POTASSIUM 25 MILLIGRAM(S): 100 TABLET, FILM COATED ORAL at 05:51

## 2018-06-26 RX ADMIN — Medication 50 GRAM(S): at 13:13

## 2018-06-26 RX ADMIN — PANTOPRAZOLE SODIUM 40 MILLIGRAM(S): 20 TABLET, DELAYED RELEASE ORAL at 05:51

## 2018-06-26 NOTE — PROGRESS NOTE ADULT - PROBLEM SELECTOR PLAN 1
-serial abdominal exams  -monitor labs/ leukocytosis  -monitor fever curve  -f/u drain output/ f/u cultures  -f/u blood cultures  -hold off on OR for now unless clinically worsens

## 2018-06-26 NOTE — PROGRESS NOTE ADULT - SUBJECTIVE AND OBJECTIVE BOX
Pt seen and examined. Status post percutaneous aspiration of intra-abdominal abscesses. T max 101.2 earlier today. He states that his abdominal pain is slightly better.     REVIEW OF SYSTEMS:  Constitutional: No fever, weight loss or fatigue  Cardiovascular: No chest pain, palpitations, dizziness or leg swelling  Gastrointestinal: Slightly less abdominal pain No nausea, vomiting or hematemesis; No diarrhea or constipation. No melena or hematochezia.  Skin: No itching, burning, rashes or lesions       MEDICATIONS:  MEDICATIONS  (STANDING):  enoxaparin Injectable 40 milliGRAM(s) SubCutaneous daily  lactated ringers. 1000 milliLiter(s) (125 mL/Hr) IV Continuous <Continuous>  losartan 25 milliGRAM(s) Oral daily  pantoprazole    Tablet 40 milliGRAM(s) Oral before breakfast  piperacillin/tazobactam IVPB. 3.375 Gram(s) IV Intermittent every 8 hours  saccharomyces boulardii 250 milliGRAM(s) Oral two times a day  vancomycin  IVPB 1000 milliGRAM(s) IV Intermittent every 8 hours    MEDICATIONS  (PRN):  acetaminophen   Tablet 650 milliGRAM(s) Oral every 6 hours PRN For Temp greater than 38 C (100.4 F)  HYDROmorphone  Injectable 1 milliGRAM(s) IV Push every 4 hours PRN Severe Pain (7 - 10)  morphine  - Injectable 2 milliGRAM(s) IV Push every 4 hours PRN Moderate Pain (4 - 6)  ondansetron Injectable 4 milliGRAM(s) IV Push every 4 hours PRN Nausea and/or Vomiting      Allergies    No Known Allergies    Intolerances        Vital Signs Last 24 Hrs  T(C): 37.3 (26 Jun 2018 05:30), Max: 38.4 (25 Jun 2018 22:02)  T(F): 99.2 (26 Jun 2018 05:30), Max: 101.2 (26 Jun 2018 00:25)  HR: 82 (26 Jun 2018 05:49) (65 - 114)  BP: 132/90 (26 Jun 2018 05:49) (120/75 - 175/106)  BP(mean): --  RR: 18 (26 Jun 2018 05:49) (18 - 20)  SpO2: 94% (26 Jun 2018 05:49) (93% - 96%)    06-25 @ 07:01  -  06-26 @ 07:00  --------------------------------------------------------  IN: 0 mL / OUT: 50 mL / NET: -50 mL        PHYSICAL EXAM:    General: Well developed; well nourished; in no acute distress  HEENT: MMM, conjunctiva pink and sclera anicteric.  Lungs: clear to auscultation bilaterally.  Cor: RRR S1, S2 only.  Gastrointestinal: Abdomen: Softly distended, moderate diffuse lower abdominal tenderness, Normal bowel sounds; No hepatosplenomegaly  Extremities: no cyanosis, clubbing or edema.  Skin: Warm and dry. No obvious rash  Neuro: Pt. a + o x 3    LABS:      CBC Full  -  ( 26 Jun 2018 06:46 )  WBC Count : 13.2 K/uL  Hemoglobin : 13.0 g/dL  Hematocrit : 38.2 %  Platelet Count - Automated : 292 K/uL  Mean Cell Volume : 87.6 fl  Mean Cell Hemoglobin : 29.8 pg  Mean Cell Hemoglobin Concentration : 34.0 g/dL  Auto Neutrophil # : x  Auto Lymphocyte # : x  Auto Monocyte # : x  Auto Eosinophil # : x  Auto Basophil # : x  Auto Neutrophil % : x  Auto Lymphocyte % : x  Auto Monocyte % : x  Auto Eosinophil % : x  Auto Basophil % : x    06-26    135  |  98  |  15.0  ----------------------------<  112  3.8   |  23.0  |  1.15    Ca    8.7      26 Jun 2018 06:46  Phos  3.9     06-26  Mg     1.7     06-26    TPro  6.9  /  Alb  3.5  /  TBili  0.9  /  DBili  x   /  AST  <5  /  ALT  9   /  AlkPhos  60  06-25    PT/INR - ( 25 Jun 2018 00:50 )   PT: 13.3 sec;   INR: 1.20 ratio         PTT - ( 25 Jun 2018 00:50 )  PTT:28.3 sec                  RADIOLOGY & ADDITIONAL STUDIES (The following images were personally reviewed):

## 2018-06-26 NOTE — PROGRESS NOTE ADULT - SUBJECTIVE AND OBJECTIVE BOX
SUBJECTIVE/24 hour events:  Patient is a 52yMale s/p transgluteal IR drainage of intra- abdominal abscess, minimal output overnight, patient overall clinically well, Tmax 101.2, IV antibiotics continued , wbc decreasing now 13.2 from 16.6.       Vital Signs Last 24 Hrs  T(C): 37.3 (26 Jun 2018 05:30), Max: 38.4 (25 Jun 2018 22:02)  T(F): 99.2 (26 Jun 2018 05:30), Max: 101.2 (26 Jun 2018 00:25)  HR: 82 (26 Jun 2018 05:49) (65 - 114)  BP: 132/90 (26 Jun 2018 05:49) (120/75 - 175/106)  BP(mean): --  RR: 18 (26 Jun 2018 05:49) (18 - 20)  SpO2: 94% (26 Jun 2018 05:49) (93% - 96%)  Drug Dosing Weight  Height (cm): 170.18 (22 Jun 2018 23:32)  Weight (kg): 95.3 (22 Jun 2018 23:32)  BMI (kg/m2): 32.9 (22 Jun 2018 23:32)  BSA (m2): 2.06 (22 Jun 2018 23:32)  I&O's Detail    25 Jun 2018 07:01  -  26 Jun 2018 07:00  --------------------------------------------------------  IN:  Total IN: 0 mL    OUT:    Drain: 50 mL  Total OUT: 50 mL    Total NET: -50 mL        Allergies    No Known Allergies    Intolerances                              13.0   13.2  )-----------( 292      ( 26 Jun 2018 06:46 )             38.2   06-26    135  |  98  |  15.0  ----------------------------<  112  3.8   |  23.0  |  1.15    Ca    8.7      26 Jun 2018 06:46  Phos  3.9     06-26  Mg     1.7     06-26    TPro  6.9  /  Alb  3.5  /  TBili  0.9  /  DBili  x   /  AST  <5  /  ALT  9   /  AlkPhos  60  06-25  PT/INR - ( 25 Jun 2018 00:50 )   PT: 13.3 sec;   INR: 1.20 ratio         PTT - ( 25 Jun 2018 00:50 )  PTT:28.3 sec    ROS:    PHYSICAL EXAM:  Constitutional: no clinical signs of  distress    Respiratory: no conversational dyspnea , ctab    Cardiovascular: nsr    Gastrointestinal: TTP, softly distended, transgluteal drain well seated,  with minimal  serious drainage    Genitourinary: voiding spontaneously     Extremities: moves all extremities at baseline     Neurological: gcs 15, A&OX3    Skin: warm, dry and no rashes           MEDICATIONS  (STANDING):  enoxaparin Injectable 40 milliGRAM(s) SubCutaneous daily  lactated ringers. 1000 milliLiter(s) (125 mL/Hr) IV Continuous <Continuous>  losartan 25 milliGRAM(s) Oral daily  pantoprazole    Tablet 40 milliGRAM(s) Oral before breakfast  piperacillin/tazobactam IVPB. 3.375 Gram(s) IV Intermittent every 8 hours  saccharomyces boulardii 250 milliGRAM(s) Oral two times a day  vancomycin  IVPB 1000 milliGRAM(s) IV Intermittent every 8 hours    MEDICATIONS  (PRN):  acetaminophen   Tablet 650 milliGRAM(s) Oral every 6 hours PRN For Temp greater than 38 C (100.4 F)  HYDROmorphone  Injectable 1 milliGRAM(s) IV Push every 4 hours PRN Severe Pain (7 - 10)  morphine  - Injectable 2 milliGRAM(s) IV Push every 4 hours PRN Moderate Pain (4 - 6)  ondansetron Injectable 4 milliGRAM(s) IV Push every 4 hours PRN Nausea and/or Vomiting      RADIOLOGY STUDIES:    CULTURES:

## 2018-06-27 LAB
ANION GAP SERPL CALC-SCNC: 15 MMOL/L — SIGNIFICANT CHANGE UP (ref 5–17)
ANISOCYTOSIS BLD QL: SLIGHT — SIGNIFICANT CHANGE UP
BASOPHILS # BLD AUTO: 0 K/UL — SIGNIFICANT CHANGE UP (ref 0–0.2)
BASOPHILS NFR BLD AUTO: 1 % — SIGNIFICANT CHANGE UP (ref 0–2)
BUN SERPL-MCNC: 12 MG/DL — SIGNIFICANT CHANGE UP (ref 8–20)
CALCIUM SERPL-MCNC: 8.3 MG/DL — LOW (ref 8.6–10.2)
CHLORIDE SERPL-SCNC: 98 MMOL/L — SIGNIFICANT CHANGE UP (ref 98–107)
CO2 SERPL-SCNC: 21 MMOL/L — LOW (ref 22–29)
CREAT SERPL-MCNC: 1.03 MG/DL — SIGNIFICANT CHANGE UP (ref 0.5–1.3)
EOSINOPHIL # BLD AUTO: 0.2 K/UL — SIGNIFICANT CHANGE UP (ref 0–0.5)
EOSINOPHIL NFR BLD AUTO: 1 % — SIGNIFICANT CHANGE UP (ref 0–6)
GLUCOSE SERPL-MCNC: 112 MG/DL — SIGNIFICANT CHANGE UP (ref 70–115)
HCT VFR BLD CALC: 37.2 % — LOW (ref 42–52)
HGB BLD-MCNC: 12.6 G/DL — LOW (ref 14–18)
LYMPHOCYTES # BLD AUTO: 0.8 K/UL — LOW (ref 1–4.8)
LYMPHOCYTES # BLD AUTO: 4 % — LOW (ref 20–55)
MAGNESIUM SERPL-MCNC: 2 MG/DL — SIGNIFICANT CHANGE UP (ref 1.6–2.6)
MCHC RBC-ENTMCNC: 29.5 PG — SIGNIFICANT CHANGE UP (ref 27–31)
MCHC RBC-ENTMCNC: 33.9 G/DL — SIGNIFICANT CHANGE UP (ref 32–36)
MCV RBC AUTO: 87.1 FL — SIGNIFICANT CHANGE UP (ref 80–94)
MICROCYTES BLD QL: SLIGHT — SIGNIFICANT CHANGE UP
MONOCYTES # BLD AUTO: 1.7 K/UL — HIGH (ref 0–0.8)
MONOCYTES NFR BLD AUTO: 9 % — SIGNIFICANT CHANGE UP (ref 3–10)
MYELOCYTES NFR BLD: 1 % — HIGH (ref 0–0)
NEUTROPHILS # BLD AUTO: 16.6 K/UL — HIGH (ref 1.8–8)
NEUTROPHILS NFR BLD AUTO: 83 % — HIGH (ref 37–73)
PHOSPHATE SERPL-MCNC: 3.8 MG/DL — SIGNIFICANT CHANGE UP (ref 2.4–4.7)
PLAT MORPH BLD: NORMAL — SIGNIFICANT CHANGE UP
PLATELET # BLD AUTO: 271 K/UL — SIGNIFICANT CHANGE UP (ref 150–400)
POIKILOCYTOSIS BLD QL AUTO: SLIGHT — SIGNIFICANT CHANGE UP
POTASSIUM SERPL-MCNC: 4.5 MMOL/L — SIGNIFICANT CHANGE UP (ref 3.5–5.3)
POTASSIUM SERPL-SCNC: 4.5 MMOL/L — SIGNIFICANT CHANGE UP (ref 3.5–5.3)
RBC # BLD: 4.27 M/UL — LOW (ref 4.6–6.2)
RBC # FLD: 13.1 % — SIGNIFICANT CHANGE UP (ref 11–15.6)
RBC BLD AUTO: ABNORMAL
SODIUM SERPL-SCNC: 134 MMOL/L — LOW (ref 135–145)
VARIANT LYMPHS # BLD: 1 % — SIGNIFICANT CHANGE UP (ref 0–6)
WBC # BLD: 19.4 K/UL — HIGH (ref 4.8–10.8)
WBC # FLD AUTO: 19.4 K/UL — HIGH (ref 4.8–10.8)

## 2018-06-27 PROCEDURE — 99232 SBSQ HOSP IP/OBS MODERATE 35: CPT

## 2018-06-27 RX ADMIN — PIPERACILLIN AND TAZOBACTAM 25 GRAM(S): 4; .5 INJECTION, POWDER, LYOPHILIZED, FOR SOLUTION INTRAVENOUS at 14:04

## 2018-06-27 RX ADMIN — Medication 250 MILLIGRAM(S): at 17:43

## 2018-06-27 RX ADMIN — PIPERACILLIN AND TAZOBACTAM 25 GRAM(S): 4; .5 INJECTION, POWDER, LYOPHILIZED, FOR SOLUTION INTRAVENOUS at 21:03

## 2018-06-27 RX ADMIN — Medication 250 MILLIGRAM(S): at 05:43

## 2018-06-27 RX ADMIN — Medication 250 MILLIGRAM(S): at 23:42

## 2018-06-27 RX ADMIN — PIPERACILLIN AND TAZOBACTAM 25 GRAM(S): 4; .5 INJECTION, POWDER, LYOPHILIZED, FOR SOLUTION INTRAVENOUS at 05:44

## 2018-06-27 RX ADMIN — Medication 250 MILLIGRAM(S): at 08:22

## 2018-06-27 RX ADMIN — PANTOPRAZOLE SODIUM 40 MILLIGRAM(S): 20 TABLET, DELAYED RELEASE ORAL at 05:44

## 2018-06-27 RX ADMIN — SODIUM CHLORIDE 125 MILLILITER(S): 9 INJECTION, SOLUTION INTRAVENOUS at 23:42

## 2018-06-27 RX ADMIN — LOSARTAN POTASSIUM 25 MILLIGRAM(S): 100 TABLET, FILM COATED ORAL at 05:44

## 2018-06-27 RX ADMIN — Medication 250 MILLIGRAM(S): at 00:17

## 2018-06-27 RX ADMIN — ENOXAPARIN SODIUM 40 MILLIGRAM(S): 100 INJECTION SUBCUTANEOUS at 12:54

## 2018-06-27 RX ADMIN — Medication 650 MILLIGRAM(S): at 08:24

## 2018-06-27 NOTE — PROGRESS NOTE ADULT - SUBJECTIVE AND OBJECTIVE BOX
[facility]  INFECTIOUS DISEASES AND INTERNAL MEDICINE at [place]  =======================================================  Andres Harley MD [facility]   Yonatan Lino MD  Diplomates American Board of Internal Medicine and Infectious Diseases  =======================================================    KAYLEE KEITH [id number]  chief complaint:  follow up on abd abscess  patient seen and examined in follow up.  Chart and labs reviewed.   s/p abscess drainage on 6/25/18 by IR  no growth from fluid cultures to date.  Blood cultures remain negative.  still with low grade temperatures.   WBC remains elevated at 19K    =======================================================  Allergies:  No Known Allergies      =======================================================  Medications:  acetaminophen   Tablet 650 milliGRAM(s) Oral every 6 hours PRN  enoxaparin Injectable 40 milliGRAM(s) SubCutaneous daily  HYDROmorphone  Injectable 1 milliGRAM(s) IV Push every 4 hours PRN  lactated ringers. 1000 milliLiter(s) IV Continuous <Continuous>  losartan 25 milliGRAM(s) Oral daily  morphine  - Injectable 2 milliGRAM(s) IV Push every 4 hours PRN  ondansetron Injectable 4 milliGRAM(s) IV Push every 4 hours PRN  pantoprazole    Tablet 40 milliGRAM(s) Oral before breakfast  piperacillin/tazobactam IVPB. 3.375 Gram(s) IV Intermittent every 8 hours  saccharomyces boulardii 250 milliGRAM(s) Oral two times a day  vancomycin  IVPB 1000 milliGRAM(s) IV Intermittent every 8 hours       =======================================================     REVIEW OF SYSTEMS:  as above  all other ROS are negative    =======================================================    Physical Exam:    Vital Signs Last 24 Hrs  T(C): 36.6 (27 Jun 2018 09:12), Max: 38.3 (26 Jun 2018 16:40)  T(F): 97.9 (27 Jun 2018 09:12), Max: 100.9 (26 Jun 2018 16:40)  HR: 78 (27 Jun 2018 09:12) (78 - 100)  BP: 109/73 (27 Jun 2018 09:12) (109/73 - 135/91)  RR: 18 (27 Jun 2018 09:12) (18 - 18)  SpO2: 94% (27 Jun 2018 09:12) (93% - 98%)    GEN: NAD, pleasant  HEENT: normocephalic and atraumatic. EOMI. CANELO.    NECK: Supple. No carotid bruits.  No lymphadenopathy or thyromegaly.  LUNGS: Clear to auscultation.  HEART: Regular rate and rhythm    ABDOMEN:    :    EXTREMITIES: Without any cyanosis, clubbing, rash, lesions or edema.  MSK: no joint swelling  NEUROLOGIC: Cranial nerves II through XII are grossly intact.  PSYCHIATRIC: Appropriate affect .  SKIN: No ulceration or induration present.    =======================================================    Labs:  06-27    134<L>  |  98  |  12.0  ----------------------------<  112  4.5   |  21.0<L>  |  1.03    Ca    8.3<L>      27 Jun 2018 06:14  Phos  3.8     06-27  Mg     2.0     06-27                            12.6   19.4  )-----------( 271      ( 27 Jun 2018 06:14 )             37.2                 CAPILLARY BLOOD GLUCOSE            RECENT CULTURES:  06-25 @ 17:40 .Abscess pelvis     No growth at 1 day.  Culture in progress    No White blood cells  No organisms seen      06-25 @ 00:53 .Blood Blood-Peripheral     No growth at 48 hours        06-23 @ 04:12 .Urine Clean Catch (Midstream)     <10,000 CFU/ml Gram Positive Cocci in Pairs and Chains        06-23 @ 01:06 .Blood Blood-Peripheral     No growth at 48 hours        06-23 @ 01:05 .Blood Blood-Peripheral     No growth at 48 hours [facility]  INFECTIOUS DISEASES AND INTERNAL MEDICINE at [place]  =======================================================  Andres Harley MD [facility]   Yonatan Lino MD  Diplomates American Board of Internal Medicine and Infectious Diseases  =======================================================    KAYLEE KEITH [id number]  chief complaint:  follow up on abd abscess  patient seen and examined in follow up.  Chart and labs reviewed.   s/p abscess drainage on 6/25/18 by IR  no growth from fluid cultures to date.  Blood cultures remain negative.  still with low grade temperatures.   WBC remains elevated at 19K    patient reports less abd pain.  no bowel movement today.  ate some italian ice and jello  =======================================================  Allergies:  No Known Allergies      =======================================================  Medications:  acetaminophen   Tablet 650 milliGRAM(s) Oral every 6 hours PRN  enoxaparin Injectable 40 milliGRAM(s) SubCutaneous daily  HYDROmorphone  Injectable 1 milliGRAM(s) IV Push every 4 hours PRN  lactated ringers. 1000 milliLiter(s) IV Continuous <Continuous>  losartan 25 milliGRAM(s) Oral daily  morphine  - Injectable 2 milliGRAM(s) IV Push every 4 hours PRN  ondansetron Injectable 4 milliGRAM(s) IV Push every 4 hours PRN  pantoprazole    Tablet 40 milliGRAM(s) Oral before breakfast  piperacillin/tazobactam IVPB. 3.375 Gram(s) IV Intermittent every 8 hours  saccharomyces boulardii 250 milliGRAM(s) Oral two times a day  vancomycin  IVPB 1000 milliGRAM(s) IV Intermittent every 8 hours       =======================================================     REVIEW OF SYSTEMS:  as above  all other ROS are negative    =======================================================    Physical Exam:    Vital Signs Last 24 Hrs  T(C): 36.6 (27 Jun 2018 09:12), Max: 38.3 (26 Jun 2018 16:40)  T(F): 97.9 (27 Jun 2018 09:12), Max: 100.9 (26 Jun 2018 16:40)  HR: 78 (27 Jun 2018 09:12) (78 - 100)  BP: 109/73 (27 Jun 2018 09:12) (109/73 - 135/91)  RR: 18 (27 Jun 2018 09:12) (18 - 18)  SpO2: 94% (27 Jun 2018 09:12) (93% - 98%)    GEN: NAD, pleasant, obese  HEENT: normocephalic and atraumatic. EOMI. CANELO.    NECK: Supple. No carotid bruits.  No lymphadenopathy or thyromegaly.  LUNGS: Clear to auscultation.  HEART: Regular rate and rhythm    ABDOMEN:  Distended abd, active BS, non tender.  RIGHT sided drain in place  :  no almodovar  EXTREMITIES: Without any cyanosis, clubbing, rash, lesions or edema.  MSK: no joint swelling  NEUROLOGIC: Cranial nerves II through XII are grossly intact.  PSYCHIATRIC: Appropriate affect .  SKIN: No ulceration or induration present.    =======================================================    Labs:  06-27    134<L>  |  98  |  12.0  ----------------------------<  112  4.5   |  21.0<L>  |  1.03    Ca    8.3<L>      27 Jun 2018 06:14  Phos  3.8     06-27  Mg     2.0     06-27                            12.6   19.4  )-----------( 271      ( 27 Jun 2018 06:14 )             37.2          RECENT CULTURES:  06-25 @ 17:40 .Abscess pelvis     No growth at 1 day.  Culture in progress    No White blood cells  No organisms seen      06-25 @ 00:53 .Blood Blood-Peripheral     No growth at 48 hours        06-23 @ 04:12 .Urine Clean Catch (Midstream)     <10,000 CFU/ml Gram Positive Cocci in Pairs and Chains        06-23 @ 01:06 .Blood Blood-Peripheral     No growth at 48 hours        06-23 @ 01:05 .Blood Blood-Peripheral     No growth at 48 hours

## 2018-06-27 NOTE — PROGRESS NOTE ADULT - SUBJECTIVE AND OBJECTIVE BOX
Pt seen and examined f/u for diverticulitis with abscess  This morning he feels better with less pain. No nausea or vomiting. IR performed placement of drain in perirectal abscess. Tolerating clear liquids. + flatus    REVIEW OF SYSTEMS:    CONSTITUTIONAL: No fever, weight loss, or fatigue  EYES: No eye pain, visual disturbances, or discharge  ENMT:  No difficulty hearing, tinnitus, vertigo; No sinus or throat pain  RESPIRATORY: No cough, wheezing, chills or hemoptysis; No shortness of breath  CARDIOVASCULAR: No chest pain, palpitations, dizziness, or leg swelling  GASTROINTESTINAL: No abdominal or epigastric pain. No nausea, vomiting, or hematemesis; No diarrhea or constipation. No melena or hematochezia.    MEDICATIONS:  MEDICATIONS  (STANDING):  enoxaparin Injectable 40 milliGRAM(s) SubCutaneous daily  lactated ringers. 1000 milliLiter(s) (125 mL/Hr) IV Continuous <Continuous>  losartan 25 milliGRAM(s) Oral daily  pantoprazole    Tablet 40 milliGRAM(s) Oral before breakfast  piperacillin/tazobactam IVPB. 3.375 Gram(s) IV Intermittent every 8 hours  saccharomyces boulardii 250 milliGRAM(s) Oral two times a day  vancomycin  IVPB 1000 milliGRAM(s) IV Intermittent every 8 hours    MEDICATIONS  (PRN):  acetaminophen   Tablet 650 milliGRAM(s) Oral every 6 hours PRN For Temp greater than 38 C (100.4 F)  HYDROmorphone  Injectable 1 milliGRAM(s) IV Push every 4 hours PRN Severe Pain (7 - 10)  morphine  - Injectable 2 milliGRAM(s) IV Push every 4 hours PRN Moderate Pain (4 - 6)  ondansetron Injectable 4 milliGRAM(s) IV Push every 4 hours PRN Nausea and/or Vomiting      Allergies    No Known Allergies    Intolerances        Vital Signs Last 24 Hrs  T(C): 37.5 (27 Jun 2018 05:37), Max: 38.3 (26 Jun 2018 16:40)  T(F): 99.5 (27 Jun 2018 05:37), Max: 100.9 (26 Jun 2018 16:40)  HR: 87 (27 Jun 2018 05:37) (87 - 100)  BP: 121/86 (27 Jun 2018 05:37) (121/86 - 135/91)  BP(mean): --  RR: 18 (27 Jun 2018 05:37) (18 - 18)  SpO2: 98% (27 Jun 2018 05:37) (93% - 98%)    06-26 @ 07:01  -  06-27 @ 07:00  --------------------------------------------------------  IN: 2090 mL / OUT: 30 mL / NET: 2060 mL        PHYSICAL EXAM:    General: Well developed; well nourished; in no acute distress  HEENT: MMM, conjunctiva and sclera clear  Gastrointestinal:Abdomen: Soft non-tender non-distended; Normal bowel sounds; No hepatosplenomegaly  Extremities: no cyanosis, clubbing or edema.  Skin: Warm and dry. No obvious rash    LABS:      CBC Full  -  ( 26 Jun 2018 06:46 )  WBC Count : 13.2 K/uL  Hemoglobin : 13.0 g/dL  Hematocrit : 38.2 %  Platelet Count - Automated : 292 K/uL  Mean Cell Volume : 87.6 fl  Mean Cell Hemoglobin : 29.8 pg  Mean Cell Hemoglobin Concentration : 34.0 g/dL  Auto Neutrophil # : x  Auto Lymphocyte # : x  Auto Monocyte # : x  Auto Eosinophil # : x  Auto Basophil # : x  Auto Neutrophil % : x  Auto Lymphocyte % : x  Auto Monocyte % : x  Auto Eosinophil % : x  Auto Basophil % : x    06-27    134<L>  |  98  |  12.0  ----------------------------<  112  4.5   |  21.0<L>  |  1.03    Ca    8.3<L>      27 Jun 2018 06:14  Phos  3.8     06-27  Mg     2.0     06-27                        RADIOLOGY & ADDITIONAL STUDIES (The following images were personally reviewed):  < from: IR CT Guided Needle Placement (06.25.18 @ 17:27) >  INTERPRETATION:  History: Diverticulitis with a prerectal abscess, here   for drainage.    : Dr. Garcia.    Sedation: Intravenous Fentanyl,   radiology nurse monitoring    Contrast: Omnipaque.    Anesthesia: 1% subcutaneous lidocaine.    Complication: None..    Procedure: The procedure, risks, benefits, and alternatives were   discussed with the patient in lamen's terms in the presence of the   interventional radiology nurse and informed consent was placed in the   chart.     The patient was placed in the left lateral decubitus position on the CT   suite bed and a time out was performed.     Anoncontrast CT of the pelvis was performed. The skin overlying the   prerectal abscess was prepped and draped in usual sterile fashion.   Utilizing a transgluteal approach, under CT guidance, an 18-gauge needle   was sequentially advanced into the collection. Over an Amplatz wire, the   needle was exchanged for a 10 Tajik drainage catheter. 30 cc of yellow   fluid with internal debris was aspirated. Samples were sent for culture   and cell count. The catheter was secured in place and attached toa   drainage bag. The patient  tolerated the procedure without complication   and left the suite in stable condition.            Findings:    The initial images show a fluid collection between the seminal vesicles   in the rectum, measuring 5 x 5 cm.Further images demonstrate the needle,   wire, and finally catheter within the collection in good position. Post   aspiration images show near complete resolution of the collection.   Incidental note is made of thickening of the sigmoid colon. There is a   second collection which is not drainable measuring 2.5 cm.    IMPRESSION:  SUCCESSFUL CT-GUIDED DRAINAGE OF A PRERECTAL ABSCESS.                  MUNIR GARCIA M.D., ATTENDING RADIOLOGIST  This document has been electronically signed. Jun 26 2018 10:19AM        < end of copied text >

## 2018-06-27 NOTE — PROGRESS NOTE ADULT - SUBJECTIVE AND OBJECTIVE BOX
INTERVAL HPI/OVERNIGHT EVENTS:  Patient seen and examine, no acute events overnight  Pain improved from yesterday  tolerating diet  Having diarrhea  Drain output 30cc  On IV antibiotics  Blood cultures negative to date  HD well       MEDICATIONS  (STANDING):  enoxaparin Injectable 40 milliGRAM(s) SubCutaneous daily  lactated ringers. 1000 milliLiter(s) (125 mL/Hr) IV Continuous <Continuous>  losartan 25 milliGRAM(s) Oral daily  pantoprazole    Tablet 40 milliGRAM(s) Oral before breakfast  piperacillin/tazobactam IVPB. 3.375 Gram(s) IV Intermittent every 8 hours  saccharomyces boulardii 250 milliGRAM(s) Oral two times a day  vancomycin  IVPB 1000 milliGRAM(s) IV Intermittent every 8 hours    MEDICATIONS  (PRN):  acetaminophen   Tablet 650 milliGRAM(s) Oral every 6 hours PRN For Temp greater than 38 C (100.4 F)  HYDROmorphone  Injectable 1 milliGRAM(s) IV Push every 4 hours PRN Severe Pain (7 - 10)  morphine  - Injectable 2 milliGRAM(s) IV Push every 4 hours PRN Moderate Pain (4 - 6)  ondansetron Injectable 4 milliGRAM(s) IV Push every 4 hours PRN Nausea and/or Vomiting      Vital Signs Last 24 Hrs  T(C): 36.6 (27 Jun 2018 09:12), Max: 38.3 (26 Jun 2018 16:40)  T(F): 97.9 (27 Jun 2018 09:12), Max: 100.9 (26 Jun 2018 16:40)  HR: 78 (27 Jun 2018 09:12) (78 - 100)  BP: 109/73 (27 Jun 2018 09:12) (109/73 - 135/91)  BP(mean): --  RR: 18 (27 Jun 2018 09:12) (18 - 18)  SpO2: 94% (27 Jun 2018 09:12) (93% - 98%)    Physical Exam:    Neurological:  No sensory/motor deficits    HEENT: PERRLA, EOMI, no drainage or redness    Neck: No bruits; no thyromegaly or nodules,  No JVD    Back: Normal spine flexure, No CVA tenderness, No deformity or limitation of movement    Respiratory: Breath Sounds equal & clear to auscultation, no accessory muscle use    Cardiovascular: Regular rate & rhythm, normal S1, S2; no murmurs, gallops or rubs    Gastrointestinal: Soft, ND, LLQ tenderness, no rebound tenderness  Extremities: No peripheral edema, No cyanosis, clubbing     Vascular: Equal and normal pulses: 2+ peripheral pulses throughout    Musculoskeletal: No joint pain, swelling or deformity; no limitation of movement    Skin: No rashes      I&O's Detail    26 Jun 2018 07:01  -  27 Jun 2018 07:00  --------------------------------------------------------  IN:    lactated ringers.: 1250 mL    Oral Fluid: 240 mL    Solution: 600 mL  Total IN: 2090 mL    OUT:    Drain: 30 mL  Total OUT: 30 mL    Total NET: 2060 mL          LABS:                        12.6   19.4  )-----------( 271      ( 27 Jun 2018 06:14 )             37.2     06-27    134<L>  |  98  |  12.0  ----------------------------<  112  4.5   |  21.0<L>  |  1.03    Ca    8.3<L>      27 Jun 2018 06:14  Phos  3.8     06-27  Mg     2.0     06-27            RADIOLOGY & ADDITIONAL STUDIES:

## 2018-06-27 NOTE — PROGRESS NOTE ADULT - PROBLEM SELECTOR PLAN 1
agree with current therapy and plans. Being follow by surgery and ID. Nothing further to add or offer at this time from GI standpoint. Will see only prn your request.

## 2018-06-27 NOTE — PROGRESS NOTE ADULT - PROBLEM SELECTOR PLAN 1
-serial abdominal exams  -Continue IV antibiotics  -monitor labs/ leukocytosis  -monitor fever curve  -f/u drain output/ f/u cultures  -f/u blood cultures-NGTD

## 2018-06-27 NOTE — PROGRESS NOTE ADULT - PROBLEM SELECTOR PLAN 1
s/p drainage of abscess with tube in place  - continue Vancomycin and Zosyn s/p drainage of abscess with tube in place  - continue Vancomycin and Zosyn  - will monitor WBC and temp curve closely.

## 2018-06-28 LAB
ANION GAP SERPL CALC-SCNC: 12 MMOL/L — SIGNIFICANT CHANGE UP (ref 5–17)
BASOPHILS # BLD AUTO: 0 K/UL — SIGNIFICANT CHANGE UP (ref 0–0.2)
BASOPHILS NFR BLD AUTO: 0.2 % — SIGNIFICANT CHANGE UP (ref 0–2)
BUN SERPL-MCNC: 10 MG/DL — SIGNIFICANT CHANGE UP (ref 8–20)
CALCIUM SERPL-MCNC: 8.1 MG/DL — LOW (ref 8.6–10.2)
CHLORIDE SERPL-SCNC: 97 MMOL/L — LOW (ref 98–107)
CO2 SERPL-SCNC: 26 MMOL/L — SIGNIFICANT CHANGE UP (ref 22–29)
CREAT SERPL-MCNC: 0.96 MG/DL — SIGNIFICANT CHANGE UP (ref 0.5–1.3)
CULTURE RESULTS: SIGNIFICANT CHANGE UP
CULTURE RESULTS: SIGNIFICANT CHANGE UP
EOSINOPHIL # BLD AUTO: 0.4 K/UL — SIGNIFICANT CHANGE UP (ref 0–0.5)
EOSINOPHIL NFR BLD AUTO: 3 % — SIGNIFICANT CHANGE UP (ref 0–5)
GLUCOSE SERPL-MCNC: 112 MG/DL — SIGNIFICANT CHANGE UP (ref 70–115)
HCT VFR BLD CALC: 35.4 % — LOW (ref 42–52)
HGB BLD-MCNC: 11.9 G/DL — LOW (ref 14–18)
LYMPHOCYTES # BLD AUTO: 0.8 K/UL — LOW (ref 1–4.8)
LYMPHOCYTES # BLD AUTO: 5.9 % — LOW (ref 20–55)
MAGNESIUM SERPL-MCNC: 1.8 MG/DL — SIGNIFICANT CHANGE UP (ref 1.6–2.6)
MCHC RBC-ENTMCNC: 29.2 PG — SIGNIFICANT CHANGE UP (ref 27–31)
MCHC RBC-ENTMCNC: 33.6 G/DL — SIGNIFICANT CHANGE UP (ref 32–36)
MCV RBC AUTO: 87 FL — SIGNIFICANT CHANGE UP (ref 80–94)
MONOCYTES # BLD AUTO: 1.1 K/UL — HIGH (ref 0–0.8)
MONOCYTES NFR BLD AUTO: 8.9 % — SIGNIFICANT CHANGE UP (ref 3–10)
NEUTROPHILS # BLD AUTO: 10.4 K/UL — HIGH (ref 1.8–8)
NEUTROPHILS NFR BLD AUTO: 81.5 % — HIGH (ref 37–73)
PHOSPHATE SERPL-MCNC: 3.1 MG/DL — SIGNIFICANT CHANGE UP (ref 2.4–4.7)
PLATELET # BLD AUTO: 432 K/UL — HIGH (ref 150–400)
POTASSIUM SERPL-MCNC: 3.4 MMOL/L — LOW (ref 3.5–5.3)
POTASSIUM SERPL-SCNC: 3.4 MMOL/L — LOW (ref 3.5–5.3)
RBC # BLD: 4.07 M/UL — LOW (ref 4.6–6.2)
RBC # FLD: 13.1 % — SIGNIFICANT CHANGE UP (ref 11–15.6)
SODIUM SERPL-SCNC: 135 MMOL/L — SIGNIFICANT CHANGE UP (ref 135–145)
SPECIMEN SOURCE: SIGNIFICANT CHANGE UP
SPECIMEN SOURCE: SIGNIFICANT CHANGE UP
WBC # BLD: 12.8 K/UL — HIGH (ref 4.8–10.8)
WBC # FLD AUTO: 12.8 K/UL — HIGH (ref 4.8–10.8)

## 2018-06-28 PROCEDURE — 99232 SBSQ HOSP IP/OBS MODERATE 35: CPT

## 2018-06-28 RX ORDER — POTASSIUM CHLORIDE 20 MEQ
10 PACKET (EA) ORAL ONCE
Qty: 0 | Refills: 0 | Status: COMPLETED | OUTPATIENT
Start: 2018-06-28 | End: 2018-06-28

## 2018-06-28 RX ADMIN — ENOXAPARIN SODIUM 40 MILLIGRAM(S): 100 INJECTION SUBCUTANEOUS at 13:03

## 2018-06-28 RX ADMIN — Medication 250 MILLIGRAM(S): at 16:15

## 2018-06-28 RX ADMIN — HYDROMORPHONE HYDROCHLORIDE 1 MILLIGRAM(S): 2 INJECTION INTRAMUSCULAR; INTRAVENOUS; SUBCUTANEOUS at 16:15

## 2018-06-28 RX ADMIN — HYDROMORPHONE HYDROCHLORIDE 1 MILLIGRAM(S): 2 INJECTION INTRAMUSCULAR; INTRAVENOUS; SUBCUTANEOUS at 16:01

## 2018-06-28 RX ADMIN — LOSARTAN POTASSIUM 25 MILLIGRAM(S): 100 TABLET, FILM COATED ORAL at 05:18

## 2018-06-28 RX ADMIN — HYDROMORPHONE HYDROCHLORIDE 1 MILLIGRAM(S): 2 INJECTION INTRAMUSCULAR; INTRAVENOUS; SUBCUTANEOUS at 10:00

## 2018-06-28 RX ADMIN — HYDROMORPHONE HYDROCHLORIDE 1 MILLIGRAM(S): 2 INJECTION INTRAMUSCULAR; INTRAVENOUS; SUBCUTANEOUS at 21:39

## 2018-06-28 RX ADMIN — PANTOPRAZOLE SODIUM 40 MILLIGRAM(S): 20 TABLET, DELAYED RELEASE ORAL at 05:18

## 2018-06-28 RX ADMIN — HYDROMORPHONE HYDROCHLORIDE 1 MILLIGRAM(S): 2 INJECTION INTRAMUSCULAR; INTRAVENOUS; SUBCUTANEOUS at 09:38

## 2018-06-28 RX ADMIN — PIPERACILLIN AND TAZOBACTAM 25 GRAM(S): 4; .5 INJECTION, POWDER, LYOPHILIZED, FOR SOLUTION INTRAVENOUS at 13:04

## 2018-06-28 RX ADMIN — Medication 250 MILLIGRAM(S): at 09:28

## 2018-06-28 RX ADMIN — SODIUM CHLORIDE 125 MILLILITER(S): 9 INJECTION, SOLUTION INTRAVENOUS at 05:18

## 2018-06-28 RX ADMIN — Medication 250 MILLIGRAM(S): at 18:58

## 2018-06-28 RX ADMIN — Medication 100 MILLIEQUIVALENT(S): at 13:02

## 2018-06-28 RX ADMIN — PIPERACILLIN AND TAZOBACTAM 25 GRAM(S): 4; .5 INJECTION, POWDER, LYOPHILIZED, FOR SOLUTION INTRAVENOUS at 21:24

## 2018-06-28 RX ADMIN — HYDROMORPHONE HYDROCHLORIDE 1 MILLIGRAM(S): 2 INJECTION INTRAMUSCULAR; INTRAVENOUS; SUBCUTANEOUS at 21:24

## 2018-06-28 RX ADMIN — Medication 250 MILLIGRAM(S): at 05:18

## 2018-06-28 RX ADMIN — PIPERACILLIN AND TAZOBACTAM 25 GRAM(S): 4; .5 INJECTION, POWDER, LYOPHILIZED, FOR SOLUTION INTRAVENOUS at 05:18

## 2018-06-28 NOTE — PROGRESS NOTE ADULT - PROBLEM SELECTOR PLAN 1
s/p drainage of abscess with tube in place  - WBC DOWNTRENDING, NEARLY NORMAL.  ELEVATED PLATELETS, REACTIVE PROCESS.   ABD DISTENTION SHOULD BE WORKED UP. ? ILEUS  - continue Vancomycin and Zosyn  - will monitor WBC and temp curve closely.  - IF NO SURGICAL PLANS THIS ADMISSION, PATIENT WILL NEED LONG TERM IV ANTIBIOTICS

## 2018-06-28 NOTE — PROGRESS NOTE ADULT - ASSESSMENT
This 52 y.o. man with sigmoid diverticulitis, found with collection anterior to the rectum (measuring 6.2 x 4.5 cm) and right hemipelvis (measuring 3.4 x 2.3 cm), fevers up to 6/27/18 at 0023.  Currently afebrile.

## 2018-06-28 NOTE — PROGRESS NOTE ADULT - SUBJECTIVE AND OBJECTIVE BOX
INTERVAL HPI/OVERNIGHT EVENTS:  Patient was seen and examined at bedside this AM. Reports some LLQ pain in AM. Is tolerating clear liquid diet. Denies nausea/vomiting, fever and chills. IR drain output is 25cc.     STATUS POST:      POST OPERATIVE DAY #:       MEDICATIONS  (STANDING):  enoxaparin Injectable 40 milliGRAM(s) SubCutaneous daily  lactated ringers. 1000 milliLiter(s) (125 mL/Hr) IV Continuous <Continuous>  losartan 25 milliGRAM(s) Oral daily  pantoprazole    Tablet 40 milliGRAM(s) Oral before breakfast  piperacillin/tazobactam IVPB. 3.375 Gram(s) IV Intermittent every 8 hours  saccharomyces boulardii 250 milliGRAM(s) Oral two times a day  vancomycin  IVPB 1000 milliGRAM(s) IV Intermittent every 8 hours    MEDICATIONS  (PRN):  acetaminophen   Tablet 650 milliGRAM(s) Oral every 6 hours PRN For Temp greater than 38 C (100.4 F)  HYDROmorphone  Injectable 1 milliGRAM(s) IV Push every 4 hours PRN Severe Pain (7 - 10)  morphine  - Injectable 2 milliGRAM(s) IV Push every 4 hours PRN Moderate Pain (4 - 6)  ondansetron Injectable 4 milliGRAM(s) IV Push every 4 hours PRN Nausea and/or Vomiting      Vital Signs Last 24 Hrs  T(C): 37 (28 Jun 2018 08:30), Max: 37.4 (28 Jun 2018 00:45)  T(F): 98.6 (28 Jun 2018 08:30), Max: 99.3 (28 Jun 2018 00:45)  HR: 72 (28 Jun 2018 08:30) (72 - 84)  BP: 141/89 (28 Jun 2018 08:30) (121/78 - 141/89)  BP(mean): --  RR: 18 (28 Jun 2018 08:30) (18 - 18)  SpO2: 96% (28 Jun 2018 08:30) (93% - 96%)    Physical Exam:  Gen: NAD  Neurological:  No sensory/motor deficits  HEENT: PERRLA, EOMI  Respiratory: Breath Sounds equal & CTA bilaterally, no accessory muscle use  Cardiovascular: Regular rate & rhythm, normal S1, S2; no murmurs, gallops or rubs  Gastrointestinal: Soft, LLQ tenderness with some distension.  Vascular: Equal and normal pulses: 2+ peripheral pulses throughout  Musculoskeletal: No joint pain, swelling or deformity; no limitation of movement  Skin: No rashes      I&O's Detail    27 Jun 2018 07:01  -  28 Jun 2018 07:00  --------------------------------------------------------  IN:    lactated ringers.: 3000 mL    Oral Fluid: 560 mL    Solution: 250 mL    Solution: 800 mL  Total IN: 4610 mL    OUT:    Drain: 25 mL  Total OUT: 25 mL    Total NET: 4585 mL          LABS:                        11.9   12.8  )-----------( 432      ( 28 Jun 2018 07:10 )             35.4     06-28    135  |  97<L>  |  10.0  ----------------------------<  112  3.4<L>   |  26.0  |  0.96    Ca    8.1<L>      28 Jun 2018 07:10  Phos  3.1     06-28  Mg     1.8     06-28            RADIOLOGY & ADDITIONAL STUDIES:

## 2018-06-28 NOTE — PROGRESS NOTE ADULT - ASSESSMENT
53yo male presents with Hinchey 2 diverticulitis s/p IR drainage   -hemodynamically stable      Plan:  Pain Control  Serial abdominal exams  IV antibiotics  Monitor drain output  f/u blood cultures-NGTD.

## 2018-06-28 NOTE — PROGRESS NOTE ADULT - SUBJECTIVE AND OBJECTIVE BOX
[facility]  INFECTIOUS DISEASES AND INTERNAL MEDICINE at [place]  =======================================================  Andres Lino MD  Diplomates American Board of Internal Medicine and Infectious Diseases  =======================================================    KAYLEE KEITH [id number]  chief complaint:  follow up on abd abscess  patient seen and examined in follow up.  Chart and labs reviewed.   s/p abscess drainage on 6/25/18 by IR  culture remain negative from abd collection    Needed to take "pain pill" this AM.  Had diarrhea all night today.     =======================================================  Allergies:  No Known Allergies    =======================================================  MEDICATIONS  (STANDING):  enoxaparin Injectable 40 milliGRAM(s) SubCutaneous daily  lactated ringers. 1000 milliLiter(s) (125 mL/Hr) IV Continuous <Continuous>  losartan 25 milliGRAM(s) Oral daily  pantoprazole    Tablet 40 milliGRAM(s) Oral before breakfast  piperacillin/tazobactam IVPB. 3.375 Gram(s) IV Intermittent every 8 hours  potassium chloride  10 mEq/100 mL IVPB 10 milliEquivalent(s) IV Intermittent once  saccharomyces boulardii 250 milliGRAM(s) Oral two times a day  vancomycin  IVPB 1000 milliGRAM(s) IV Intermittent every 8 hours    =======================================================     REVIEW OF SYSTEMS:  as above  all other ROS are negative    =======================================================    Physical Exam:  Vital Signs Last 24 Hrs  T(C): 37 (28 Jun 2018 08:30), Max: 37.4 (28 Jun 2018 00:45)  T(F): 98.6 (28 Jun 2018 08:30), Max: 99.3 (28 Jun 2018 00:45)  HR: 72 (28 Jun 2018 08:30) (72 - 84)  BP: 141/89 (28 Jun 2018 08:30) (121/78 - 141/89)  RR: 18 (28 Jun 2018 08:30) (18 - 18)  SpO2: 96% (28 Jun 2018 08:30) (93% - 96%)    GEN: NAD, pleasant, obese  HEENT: normocephalic and atraumatic. EOMI. CANELO.    NECK: Supple. No carotid bruits.  No lymphadenopathy or thyromegaly.  LUNGS: Clear to auscultation.  HEART: Regular rate and rhythm    ABDOMEN:  Distended abd, NO significant change. active BS, non tender.  RIGHT sided drain in place  EXTREMITIES: Without any cyanosis, clubbing, rash, lesions or edema.  MSK: no joint swelling  NEUROLOGIC: Cranial nerves II through XII are grossly intact.  PSYCHIATRIC: Appropriate affect .  SKIN: No ulceration or induration present.    =======================================================      Labs:  06-28    135  |  97<L>  |  10.0  ----------------------------<  112  3.4<L>   |  26.0  |  0.96    Ca    8.1<L>      28 Jun 2018 07:10  Phos  3.1     06-28  Mg     1.8     06-28                            11.9   12.8  )-----------( 432      ( 28 Jun 2018 07:10 )             35.4                 CAPILLARY BLOOD GLUCOSE            RECENT CULTURES:  06-25 @ 17:40 .Abscess pelvis     Culture in progress    No White blood cells  No organisms seen      06-25 @ 00:53 .Blood Blood-Peripheral     No growth at 48 hours        06-23 @ 04:12 .Urine Clean Catch (Midstream)     <10,000 CFU/ml Gram Positive Cocci in Pairs and Chains        06-23 @ 01:06 .Blood Blood-Peripheral     No growth at 5 days.        06-23 @ 01:05 .Blood Blood-Peripheral     No growth at 5 days.

## 2018-06-29 ENCOUNTER — TRANSCRIPTION ENCOUNTER (OUTPATIENT)
Age: 53
End: 2018-06-29

## 2018-06-29 LAB
-  AMPICILLIN: SIGNIFICANT CHANGE UP
-  TETRACYCLINE: SIGNIFICANT CHANGE UP
-  VANCOMYCIN: SIGNIFICANT CHANGE UP
ANION GAP SERPL CALC-SCNC: 13 MMOL/L — SIGNIFICANT CHANGE UP (ref 5–17)
ANISOCYTOSIS BLD QL: SLIGHT — SIGNIFICANT CHANGE UP
APPEARANCE UR: CLEAR — SIGNIFICANT CHANGE UP
BILIRUB UR-MCNC: NEGATIVE — SIGNIFICANT CHANGE UP
BUN SERPL-MCNC: 9 MG/DL — SIGNIFICANT CHANGE UP (ref 8–20)
CALCIUM SERPL-MCNC: 8.1 MG/DL — LOW (ref 8.6–10.2)
CHLORIDE SERPL-SCNC: 95 MMOL/L — LOW (ref 98–107)
CO2 SERPL-SCNC: 27 MMOL/L — SIGNIFICANT CHANGE UP (ref 22–29)
COLOR SPEC: YELLOW — SIGNIFICANT CHANGE UP
CREAT SERPL-MCNC: 0.98 MG/DL — SIGNIFICANT CHANGE UP (ref 0.5–1.3)
DIFF PNL FLD: ABNORMAL
EOSINOPHIL NFR BLD AUTO: 1 % — SIGNIFICANT CHANGE UP (ref 0–6)
EPI CELLS # UR: SIGNIFICANT CHANGE UP
GLUCOSE SERPL-MCNC: 116 MG/DL — HIGH (ref 70–115)
GLUCOSE UR QL: NEGATIVE MG/DL — SIGNIFICANT CHANGE UP
HCT VFR BLD CALC: 34.4 % — LOW (ref 42–52)
HCT VFR BLD CALC: 39.3 % — LOW (ref 42–52)
HGB BLD-MCNC: 11.6 G/DL — LOW (ref 14–18)
HGB BLD-MCNC: 13.3 G/DL — LOW (ref 14–18)
KETONES UR-MCNC: ABNORMAL
LEUKOCYTE ESTERASE UR-ACNC: NEGATIVE — SIGNIFICANT CHANGE UP
LG PLATELETS BLD QL AUTO: SLIGHT — SIGNIFICANT CHANGE UP
MAGNESIUM SERPL-MCNC: 1.7 MG/DL — LOW (ref 1.8–2.6)
MCHC RBC-ENTMCNC: 29.2 PG — SIGNIFICANT CHANGE UP (ref 27–31)
MCHC RBC-ENTMCNC: 29.2 PG — SIGNIFICANT CHANGE UP (ref 27–31)
MCHC RBC-ENTMCNC: 33.7 G/DL — SIGNIFICANT CHANGE UP (ref 32–36)
MCHC RBC-ENTMCNC: 33.8 G/DL — SIGNIFICANT CHANGE UP (ref 32–36)
MCV RBC AUTO: 86.2 FL — SIGNIFICANT CHANGE UP (ref 80–94)
MCV RBC AUTO: 86.6 FL — SIGNIFICANT CHANGE UP (ref 80–94)
METHOD TYPE: SIGNIFICANT CHANGE UP
MONOCYTES NFR BLD AUTO: 5 % — SIGNIFICANT CHANGE UP (ref 3–10)
NEUTROPHILS NFR BLD AUTO: 88 % — HIGH (ref 37–73)
NEUTS BAND # BLD: 4 % — SIGNIFICANT CHANGE UP (ref 0–8)
NITRITE UR-MCNC: NEGATIVE — SIGNIFICANT CHANGE UP
PH UR: 6 — SIGNIFICANT CHANGE UP (ref 5–8)
PHOSPHATE SERPL-MCNC: 3.3 MG/DL — SIGNIFICANT CHANGE UP (ref 2.4–4.7)
PLAT MORPH BLD: ABNORMAL
PLATELET # BLD AUTO: 475 K/UL — HIGH (ref 150–400)
PLATELET # BLD AUTO: 584 K/UL — HIGH (ref 150–400)
POIKILOCYTOSIS BLD QL AUTO: SLIGHT — SIGNIFICANT CHANGE UP
POTASSIUM SERPL-MCNC: 3.4 MMOL/L — LOW (ref 3.5–5.3)
POTASSIUM SERPL-SCNC: 3.4 MMOL/L — LOW (ref 3.5–5.3)
PROT UR-MCNC: 30 MG/DL
RBC # BLD: 3.97 M/UL — LOW (ref 4.6–6.2)
RBC # BLD: 4.56 M/UL — LOW (ref 4.6–6.2)
RBC # FLD: 13 % — SIGNIFICANT CHANGE UP (ref 11–15.6)
RBC # FLD: 13 % — SIGNIFICANT CHANGE UP (ref 11–15.6)
RBC BLD AUTO: ABNORMAL
RBC CASTS # UR COMP ASSIST: SIGNIFICANT CHANGE UP /HPF (ref 0–4)
SODIUM SERPL-SCNC: 135 MMOL/L — SIGNIFICANT CHANGE UP (ref 135–145)
SP GR SPEC: 1.01 — SIGNIFICANT CHANGE UP (ref 1.01–1.02)
UROBILINOGEN FLD QL: NEGATIVE MG/DL — SIGNIFICANT CHANGE UP
VANCOMYCIN TROUGH SERPL-MCNC: 15.7 UG/ML — SIGNIFICANT CHANGE UP (ref 10–20)
VARIANT LYMPHS # BLD: 2 % — SIGNIFICANT CHANGE UP (ref 0–6)
WBC # BLD: 12.5 K/UL — HIGH (ref 4.8–10.8)
WBC # BLD: 13.7 K/UL — HIGH (ref 4.8–10.8)
WBC # FLD AUTO: 12.5 K/UL — HIGH (ref 4.8–10.8)
WBC # FLD AUTO: 13.7 K/UL — HIGH (ref 4.8–10.8)
WBC UR QL: NEGATIVE — SIGNIFICANT CHANGE UP

## 2018-06-29 PROCEDURE — 71045 X-RAY EXAM CHEST 1 VIEW: CPT | Mod: 26

## 2018-06-29 PROCEDURE — 99232 SBSQ HOSP IP/OBS MODERATE 35: CPT

## 2018-06-29 RX ORDER — SODIUM CHLORIDE 9 MG/ML
1000 INJECTION, SOLUTION INTRAVENOUS
Qty: 0 | Refills: 0 | Status: DISCONTINUED | OUTPATIENT
Start: 2018-06-29 | End: 2018-07-06

## 2018-06-29 RX ORDER — SODIUM CHLORIDE 9 MG/ML
1000 INJECTION, SOLUTION INTRAVENOUS
Qty: 0 | Refills: 0 | Status: DISCONTINUED | OUTPATIENT
Start: 2018-06-29 | End: 2018-06-29

## 2018-06-29 RX ORDER — ACETAMINOPHEN 500 MG
1000 TABLET ORAL ONCE
Qty: 0 | Refills: 0 | Status: COMPLETED | OUTPATIENT
Start: 2018-06-29 | End: 2018-06-29

## 2018-06-29 RX ORDER — MAGNESIUM SULFATE 500 MG/ML
2 VIAL (ML) INJECTION ONCE
Qty: 0 | Refills: 0 | Status: COMPLETED | OUTPATIENT
Start: 2018-06-29 | End: 2018-06-29

## 2018-06-29 RX ORDER — POTASSIUM CHLORIDE 20 MEQ
10 PACKET (EA) ORAL ONCE
Qty: 0 | Refills: 0 | Status: COMPLETED | OUTPATIENT
Start: 2018-06-29 | End: 2018-06-29

## 2018-06-29 RX ORDER — ERTAPENEM SODIUM 1 G/1
1000 INJECTION, POWDER, LYOPHILIZED, FOR SOLUTION INTRAMUSCULAR; INTRAVENOUS EVERY 24 HOURS
Qty: 0 | Refills: 0 | Status: DISCONTINUED | OUTPATIENT
Start: 2018-06-29 | End: 2018-06-30

## 2018-06-29 RX ADMIN — MORPHINE SULFATE 2 MILLIGRAM(S): 50 CAPSULE, EXTENDED RELEASE ORAL at 15:35

## 2018-06-29 RX ADMIN — SODIUM CHLORIDE 125 MILLILITER(S): 9 INJECTION, SOLUTION INTRAVENOUS at 03:20

## 2018-06-29 RX ADMIN — Medication 50 GRAM(S): at 12:58

## 2018-06-29 RX ADMIN — Medication 650 MILLIGRAM(S): at 20:30

## 2018-06-29 RX ADMIN — LOSARTAN POTASSIUM 25 MILLIGRAM(S): 100 TABLET, FILM COATED ORAL at 06:07

## 2018-06-29 RX ADMIN — HYDROMORPHONE HYDROCHLORIDE 1 MILLIGRAM(S): 2 INJECTION INTRAMUSCULAR; INTRAVENOUS; SUBCUTANEOUS at 16:57

## 2018-06-29 RX ADMIN — HYDROMORPHONE HYDROCHLORIDE 1 MILLIGRAM(S): 2 INJECTION INTRAMUSCULAR; INTRAVENOUS; SUBCUTANEOUS at 17:10

## 2018-06-29 RX ADMIN — ENOXAPARIN SODIUM 40 MILLIGRAM(S): 100 INJECTION SUBCUTANEOUS at 11:02

## 2018-06-29 RX ADMIN — PANTOPRAZOLE SODIUM 40 MILLIGRAM(S): 20 TABLET, DELAYED RELEASE ORAL at 06:07

## 2018-06-29 RX ADMIN — HYDROMORPHONE HYDROCHLORIDE 1 MILLIGRAM(S): 2 INJECTION INTRAMUSCULAR; INTRAVENOUS; SUBCUTANEOUS at 03:17

## 2018-06-29 RX ADMIN — MORPHINE SULFATE 2 MILLIGRAM(S): 50 CAPSULE, EXTENDED RELEASE ORAL at 15:50

## 2018-06-29 RX ADMIN — ERTAPENEM SODIUM 120 MILLIGRAM(S): 1 INJECTION, POWDER, LYOPHILIZED, FOR SOLUTION INTRAMUSCULAR; INTRAVENOUS at 12:57

## 2018-06-29 RX ADMIN — Medication 250 MILLIGRAM(S): at 01:44

## 2018-06-29 RX ADMIN — Medication 400 MILLIGRAM(S): at 21:10

## 2018-06-29 RX ADMIN — Medication 250 MILLIGRAM(S): at 17:01

## 2018-06-29 RX ADMIN — Medication 250 MILLIGRAM(S): at 08:33

## 2018-06-29 RX ADMIN — Medication 100 MILLIEQUIVALENT(S): at 12:57

## 2018-06-29 RX ADMIN — SODIUM CHLORIDE 125 MILLILITER(S): 9 INJECTION, SOLUTION INTRAVENOUS at 21:12

## 2018-06-29 RX ADMIN — PIPERACILLIN AND TAZOBACTAM 25 GRAM(S): 4; .5 INJECTION, POWDER, LYOPHILIZED, FOR SOLUTION INTRAVENOUS at 06:07

## 2018-06-29 RX ADMIN — Medication 250 MILLIGRAM(S): at 06:07

## 2018-06-29 RX ADMIN — HYDROMORPHONE HYDROCHLORIDE 1 MILLIGRAM(S): 2 INJECTION INTRAMUSCULAR; INTRAVENOUS; SUBCUTANEOUS at 03:32

## 2018-06-29 NOTE — PROGRESS NOTE ADULT - SUBJECTIVE AND OBJECTIVE BOX
INTERVAL HPI/OVERNIGHT EVENTS:    SUBJECTIVE:  No overnight events. MISTY drain 25cc. Tolerating diet. +flatus, ambulating well. Pain well controlled.    MEDICATIONS  (STANDING):  enoxaparin Injectable 40 milliGRAM(s) SubCutaneous daily  lactated ringers. 1000 milliLiter(s) (125 mL/Hr) IV Continuous <Continuous>  losartan 25 milliGRAM(s) Oral daily  pantoprazole    Tablet 40 milliGRAM(s) Oral before breakfast  piperacillin/tazobactam IVPB. 3.375 Gram(s) IV Intermittent every 8 hours  saccharomyces boulardii 250 milliGRAM(s) Oral two times a day  vancomycin  IVPB 1000 milliGRAM(s) IV Intermittent every 8 hours    MEDICATIONS  (PRN):  acetaminophen   Tablet 650 milliGRAM(s) Oral every 6 hours PRN For Temp greater than 38 C (100.4 F)  HYDROmorphone  Injectable 1 milliGRAM(s) IV Push every 4 hours PRN Severe Pain (7 - 10)  morphine  - Injectable 2 milliGRAM(s) IV Push every 4 hours PRN Moderate Pain (4 - 6)  ondansetron Injectable 4 milliGRAM(s) IV Push every 4 hours PRN Nausea and/or Vomiting      Vital Signs Last 24 Hrs  T(C): 37.2 (29 Jun 2018 00:45), Max: 37.2 (29 Jun 2018 00:45)  T(F): 98.9 (29 Jun 2018 00:45), Max: 98.9 (29 Jun 2018 00:45)  HR: 88 (29 Jun 2018 06:06) (87 - 101)  BP: 148/98 (29 Jun 2018 06:06) (137/96 - 148/98)  BP(mean): --  RR: 20 (29 Jun 2018 06:06) (18 - 20)  SpO2: 93% (29 Jun 2018 06:06) (90% - 93%)    PE  Gen: NAD  Pulm: Non labored breathing  CV: RRR  Abd: Soft, NT/ND. Drain recently drained. No active collection in bag.  Vasc: Cap refill <2s  Neuro:      I&O's Detail      LABS:                        11.6   12.5  )-----------( 475      ( 29 Jun 2018 06:27 )             34.4     06-29    135  |  95<L>  |  9.0  ----------------------------<  116<H>  3.4<L>   |  27.0  |  0.98    Ca    8.1<L>      29 Jun 2018 06:27  Phos  3.3     06-29  Mg     1.7     06-29            RADIOLOGY & ADDITIONAL STUDIES:

## 2018-06-29 NOTE — DIETITIAN INITIAL EVALUATION ADULT. - OTHER INFO
Pt admitted with diverticulitis, s/p IR drain of abscess. Pt is unsure of UBW at home, denies weight changes. Diet education provided.

## 2018-06-29 NOTE — PROGRESS NOTE ADULT - ASSESSMENT
52yoM s/p IR drain of collection from hinchey 2 diverticulitis  - Serial exams  - Monitor white blood cell count  - Pain mgmt  - IV abx  - Monitor food tolerance

## 2018-06-29 NOTE — PROGRESS NOTE ADULT - PROBLEM SELECTOR PLAN 1
s/p drainage of abscess with tube in place  - clinically improving  - on Zosyn and Vanco, will SIMPLIFY regimen to Ertapenem 1 gram daily IV

## 2018-06-29 NOTE — CHART NOTE - NSCHARTNOTEFT_GEN_A_CORE
On call ACS/trauma notified regarding patient's fever to 103.2 (rest of the vitals  /98 and 94% on 3L previously high 80s on RA). Patient seen and examined at bedside. No change in physical exam from today. Stat CXR, labs, blood Cxs and U/A sent. Tylenol administered. WBC resulted as 13.7 from 12.5. U/A was negative for any infection. CXR unremarkable. Patient currently NPO with IVFs pending CT A/P in the AM. On call ACS/trauma notified regarding patient's fever to 103.2 (rest of the vitals  /98 and 94% on 3L previously high 80s on RA). Patient seen and examined at bedside. No change in physical exam from today - minimal abdominal pain at drain site. Stat CXR, labs, blood Cxs and U/A sent. Tylenol administered. WBC resulted as 13.7 from 12.5. U/A was negative for any infection. CXR unremarkable. Patient currently NPO with IVFs pending CT A/P in the AM.

## 2018-06-29 NOTE — PROGRESS NOTE ADULT - SUBJECTIVE AND OBJECTIVE BOX
[facility]  INFECTIOUS DISEASES AND INTERNAL MEDICINE at [place]  =======================================================  Andres Harley MD [facility] Hospital[facility]ANE Lino MD  Diplomates American Board of Internal Medicine and Infectious Diseases  =======================================================    KAYLEE KEITH [id number]  chief complaint:  follow up on abd abscess  patient seen and examined in follow up.  Chart and labs reviewed.   s/p abscess drainage on 6/25/18 by IR  culture remain negative from abd collection    moving bowels. tolerating diet.   no fevers    =======================================================  Allergies:  No Known Allergies    =======================================================  MEDICATIONS  (STANDING):  enoxaparin Injectable 40 milliGRAM(s) SubCutaneous daily  lactated ringers. 1000 milliLiter(s) (75 mL/Hr) IV Continuous <Continuous>  losartan 25 milliGRAM(s) Oral daily  pantoprazole    Tablet 40 milliGRAM(s) Oral before breakfast  piperacillin/tazobactam IVPB. 3.375 Gram(s) IV Intermittent every 8 hours  saccharomyces boulardii 250 milliGRAM(s) Oral two times a day  vancomycin  IVPB 1000 milliGRAM(s) IV Intermittent every 8 hours    =======================================================     REVIEW OF SYSTEMS:  as above  all other ROS are negative    =======================================================    Physical Exam:  Vital Signs Last 24 Hrs  T(C): 37 (28 Jun 2018 08:30), Max: 37.4 (28 Jun 2018 00:45)  T(F): 98.6 (28 Jun 2018 08:30), Max: 99.3 (28 Jun 2018 00:45)  HR: 72 (28 Jun 2018 08:30) (72 - 84)  BP: 141/89 (28 Jun 2018 08:30) (121/78 - 141/89)  RR: 18 (28 Jun 2018 08:30) (18 - 18)  SpO2: 96% (28 Jun 2018 08:30) (93% - 96%)    GEN: NAD, pleasant, obese  HEENT: normocephalic and atraumatic. EOMI. CANELO.    NECK: Supple. No carotid bruits.  No lymphadenopathy or thyromegaly.  LUNGS: Clear to auscultation.  HEART: Regular rate and rhythm    ABDOMEN:  Distended abd, NO significant change. active BS, non tender.  RIGHT sided drain in place - scant serous drainage  EXTREMITIES: Without any cyanosis, clubbing, rash, lesions or edema.  MSK: no joint swelling  NEUROLOGIC: Cranial nerves II through XII are grossly intact.  PSYCHIATRIC: Appropriate affect .  SKIN: No ulceration or induration present.    =======================================================      Labs:  06-29    135  |  95<L>  |  9.0  ----------------------------<  116<H>  3.4<L>   |  27.0  |  0.98    Ca    8.1<L>      29 Jun 2018 06:27  Phos  3.3     06-29  Mg     1.7     06-29                            11.6   12.5  )-----------( 475      ( 29 Jun 2018 06:27 )             34.4                 CAPILLARY BLOOD GLUCOSE            RECENT CULTURES:  06-25 @ 17:40 .Abscess pelvis     Culture in progress    No White blood cells  No organisms seen      06-25 @ 00:53 .Blood Blood-Peripheral     No growth at 48 hours        06-23 @ 04:12 .Urine Clean Catch (Midstream)     <10,000 CFU/ml Gram Positive Cocci in Pairs and Chains        06-23 @ 01:06 .Blood Blood-Peripheral     No growth at 5 days.        06-23 @ 01:05 .Blood Blood-Peripheral     No growth at 5 days.

## 2018-06-29 NOTE — PROGRESS NOTE ADULT - ASSESSMENT
This 52 y.o. man with sigmoid diverticulitis, found with collection anterior to the rectum (measuring 6.2 x 4.5 cm) and right hemipelvis (measuring 3.4 x 2.3 cm),  resolution of fevers.

## 2018-06-30 ENCOUNTER — RESULT REVIEW (OUTPATIENT)
Age: 53
End: 2018-06-30

## 2018-06-30 LAB
ABO RH CONFIRMATION: SIGNIFICANT CHANGE UP
ANION GAP SERPL CALC-SCNC: 13 MMOL/L — SIGNIFICANT CHANGE UP (ref 5–17)
APTT BLD: 27.8 SEC — SIGNIFICANT CHANGE UP (ref 27.5–37.4)
BASOPHILS # BLD AUTO: 0 K/UL — SIGNIFICANT CHANGE UP (ref 0–0.2)
BASOPHILS NFR BLD AUTO: 0.2 % — SIGNIFICANT CHANGE UP (ref 0–2)
BLD GP AB SCN SERPL QL: SIGNIFICANT CHANGE UP
BUN SERPL-MCNC: 9 MG/DL — SIGNIFICANT CHANGE UP (ref 8–20)
CALCIUM SERPL-MCNC: 7.9 MG/DL — LOW (ref 8.6–10.2)
CHLORIDE SERPL-SCNC: 95 MMOL/L — LOW (ref 98–107)
CO2 SERPL-SCNC: 28 MMOL/L — SIGNIFICANT CHANGE UP (ref 22–29)
CREAT SERPL-MCNC: 0.85 MG/DL — SIGNIFICANT CHANGE UP (ref 0.5–1.3)
CULTURE RESULTS: SIGNIFICANT CHANGE UP
CULTURE RESULTS: SIGNIFICANT CHANGE UP
EOSINOPHIL # BLD AUTO: 0.1 K/UL — SIGNIFICANT CHANGE UP (ref 0–0.5)
EOSINOPHIL NFR BLD AUTO: 0.8 % — SIGNIFICANT CHANGE UP (ref 0–5)
GLUCOSE BLDC GLUCOMTR-MCNC: 104 MG/DL — HIGH (ref 70–99)
GLUCOSE BLDC GLUCOMTR-MCNC: 134 MG/DL — HIGH (ref 70–99)
GLUCOSE BLDC GLUCOMTR-MCNC: 142 MG/DL — HIGH (ref 70–99)
GLUCOSE SERPL-MCNC: 128 MG/DL — HIGH (ref 70–115)
HCT VFR BLD CALC: 38.5 % — LOW (ref 42–52)
HGB BLD-MCNC: 13 G/DL — LOW (ref 14–18)
INR BLD: 1.23 RATIO — HIGH (ref 0.88–1.16)
LYMPHOCYTES # BLD AUTO: 1 K/UL — SIGNIFICANT CHANGE UP (ref 1–4.8)
LYMPHOCYTES # BLD AUTO: 6.1 % — LOW (ref 20–55)
MAGNESIUM SERPL-MCNC: 2.1 MG/DL — SIGNIFICANT CHANGE UP (ref 1.6–2.6)
MCHC RBC-ENTMCNC: 29.1 PG — SIGNIFICANT CHANGE UP (ref 27–31)
MCHC RBC-ENTMCNC: 33.8 G/DL — SIGNIFICANT CHANGE UP (ref 32–36)
MCV RBC AUTO: 86.3 FL — SIGNIFICANT CHANGE UP (ref 80–94)
MONOCYTES # BLD AUTO: 1.3 K/UL — HIGH (ref 0–0.8)
MONOCYTES NFR BLD AUTO: 7.8 % — SIGNIFICANT CHANGE UP (ref 3–10)
NEUTROPHILS # BLD AUTO: 14 K/UL — HIGH (ref 1.8–8)
NEUTROPHILS NFR BLD AUTO: 84 % — HIGH (ref 37–73)
PHOSPHATE SERPL-MCNC: 3.8 MG/DL — SIGNIFICANT CHANGE UP (ref 2.4–4.7)
PLATELET # BLD AUTO: 563 K/UL — HIGH (ref 150–400)
POTASSIUM SERPL-MCNC: 3.6 MMOL/L — SIGNIFICANT CHANGE UP (ref 3.5–5.3)
POTASSIUM SERPL-SCNC: 3.6 MMOL/L — SIGNIFICANT CHANGE UP (ref 3.5–5.3)
PROTHROM AB SERPL-ACNC: 13.6 SEC — HIGH (ref 9.8–12.7)
RBC # BLD: 4.46 M/UL — LOW (ref 4.6–6.2)
RBC # FLD: 13.2 % — SIGNIFICANT CHANGE UP (ref 11–15.6)
SODIUM SERPL-SCNC: 136 MMOL/L — SIGNIFICANT CHANGE UP (ref 135–145)
SPECIMEN SOURCE: SIGNIFICANT CHANGE UP
SPECIMEN SOURCE: SIGNIFICANT CHANGE UP
TYPE + AB SCN PNL BLD: SIGNIFICANT CHANGE UP
WBC # BLD: 16.6 K/UL — HIGH (ref 4.8–10.8)
WBC # FLD AUTO: 16.6 K/UL — HIGH (ref 4.8–10.8)

## 2018-06-30 PROCEDURE — 88307 TISSUE EXAM BY PATHOLOGIST: CPT | Mod: 26

## 2018-06-30 PROCEDURE — 74177 CT ABD & PELVIS W/CONTRAST: CPT | Mod: 26

## 2018-06-30 PROCEDURE — 88305 TISSUE EXAM BY PATHOLOGIST: CPT | Mod: 26

## 2018-06-30 PROCEDURE — 99233 SBSQ HOSP IP/OBS HIGH 50: CPT

## 2018-06-30 PROCEDURE — 44141 PARTIAL REMOVAL OF COLON: CPT

## 2018-06-30 RX ORDER — ACETAMINOPHEN 500 MG
1000 TABLET ORAL ONCE
Qty: 0 | Refills: 0 | Status: COMPLETED | OUTPATIENT
Start: 2018-06-30 | End: 2018-06-30

## 2018-06-30 RX ORDER — HYDROMORPHONE HYDROCHLORIDE 2 MG/ML
0.5 INJECTION INTRAMUSCULAR; INTRAVENOUS; SUBCUTANEOUS
Qty: 0 | Refills: 0 | Status: DISCONTINUED | OUTPATIENT
Start: 2018-06-30 | End: 2018-07-01

## 2018-06-30 RX ORDER — HYDROMORPHONE HYDROCHLORIDE 2 MG/ML
1 INJECTION INTRAMUSCULAR; INTRAVENOUS; SUBCUTANEOUS ONCE
Qty: 0 | Refills: 0 | Status: DISCONTINUED | OUTPATIENT
Start: 2018-06-30 | End: 2018-06-30

## 2018-06-30 RX ORDER — OXYCODONE AND ACETAMINOPHEN 5; 325 MG/1; MG/1
1 TABLET ORAL EVERY 4 HOURS
Qty: 0 | Refills: 0 | Status: DISCONTINUED | OUTPATIENT
Start: 2018-06-30 | End: 2018-06-30

## 2018-06-30 RX ORDER — DOCUSATE SODIUM 100 MG
100 CAPSULE ORAL
Qty: 0 | Refills: 0 | Status: DISCONTINUED | OUTPATIENT
Start: 2018-06-30 | End: 2018-06-30

## 2018-06-30 RX ORDER — METRONIDAZOLE 500 MG
500 TABLET ORAL ONCE
Qty: 0 | Refills: 0 | Status: COMPLETED | OUTPATIENT
Start: 2018-06-30 | End: 2018-07-01

## 2018-06-30 RX ORDER — CEFEPIME 1 G/1
2000 INJECTION, POWDER, FOR SOLUTION INTRAMUSCULAR; INTRAVENOUS EVERY 8 HOURS
Qty: 0 | Refills: 0 | Status: DISCONTINUED | OUTPATIENT
Start: 2018-07-01 | End: 2018-07-04

## 2018-06-30 RX ORDER — ONDANSETRON 8 MG/1
4 TABLET, FILM COATED ORAL ONCE
Qty: 0 | Refills: 0 | Status: DISCONTINUED | OUTPATIENT
Start: 2018-06-30 | End: 2018-07-01

## 2018-06-30 RX ORDER — HYDROMORPHONE HYDROCHLORIDE 2 MG/ML
1 INJECTION INTRAMUSCULAR; INTRAVENOUS; SUBCUTANEOUS EVERY 6 HOURS
Qty: 0 | Refills: 0 | Status: DISCONTINUED | OUTPATIENT
Start: 2018-06-30 | End: 2018-07-01

## 2018-06-30 RX ORDER — SENNA PLUS 8.6 MG/1
2 TABLET ORAL AT BEDTIME
Qty: 0 | Refills: 0 | Status: DISCONTINUED | OUTPATIENT
Start: 2018-06-30 | End: 2018-06-30

## 2018-06-30 RX ORDER — MORPHINE SULFATE 50 MG/1
4 CAPSULE, EXTENDED RELEASE ORAL EVERY 6 HOURS
Qty: 0 | Refills: 0 | Status: DISCONTINUED | OUTPATIENT
Start: 2018-06-30 | End: 2018-07-01

## 2018-06-30 RX ORDER — ACETAMINOPHEN 500 MG
650 TABLET ORAL EVERY 6 HOURS
Qty: 0 | Refills: 0 | Status: DISCONTINUED | OUTPATIENT
Start: 2018-06-30 | End: 2018-06-30

## 2018-06-30 RX ORDER — CEFEPIME 1 G/1
2000 INJECTION, POWDER, FOR SOLUTION INTRAMUSCULAR; INTRAVENOUS ONCE
Qty: 0 | Refills: 0 | Status: COMPLETED | OUTPATIENT
Start: 2018-06-30 | End: 2018-07-01

## 2018-06-30 RX ORDER — OXYCODONE AND ACETAMINOPHEN 5; 325 MG/1; MG/1
2 TABLET ORAL EVERY 4 HOURS
Qty: 0 | Refills: 0 | Status: DISCONTINUED | OUTPATIENT
Start: 2018-06-30 | End: 2018-06-30

## 2018-06-30 RX ORDER — METRONIDAZOLE 500 MG
TABLET ORAL
Qty: 0 | Refills: 0 | Status: DISCONTINUED | OUTPATIENT
Start: 2018-06-30 | End: 2018-07-04

## 2018-06-30 RX ORDER — HYDROMORPHONE HYDROCHLORIDE 2 MG/ML
0.5 INJECTION INTRAMUSCULAR; INTRAVENOUS; SUBCUTANEOUS EVERY 4 HOURS
Qty: 0 | Refills: 0 | Status: DISCONTINUED | OUTPATIENT
Start: 2018-06-30 | End: 2018-07-03

## 2018-06-30 RX ORDER — MORPHINE SULFATE 50 MG/1
2 CAPSULE, EXTENDED RELEASE ORAL EVERY 4 HOURS
Qty: 0 | Refills: 0 | Status: DISCONTINUED | OUTPATIENT
Start: 2018-06-30 | End: 2018-07-01

## 2018-06-30 RX ORDER — CEFEPIME 1 G/1
INJECTION, POWDER, FOR SOLUTION INTRAMUSCULAR; INTRAVENOUS
Qty: 0 | Refills: 0 | Status: DISCONTINUED | OUTPATIENT
Start: 2018-06-30 | End: 2018-07-04

## 2018-06-30 RX ORDER — METRONIDAZOLE 500 MG
500 TABLET ORAL EVERY 8 HOURS
Qty: 0 | Refills: 0 | Status: DISCONTINUED | OUTPATIENT
Start: 2018-07-01 | End: 2018-07-04

## 2018-06-30 RX ORDER — SODIUM CHLORIDE 9 MG/ML
1000 INJECTION, SOLUTION INTRAVENOUS
Qty: 0 | Refills: 0 | Status: DISCONTINUED | OUTPATIENT
Start: 2018-06-30 | End: 2018-07-01

## 2018-06-30 RX ORDER — PANTOPRAZOLE SODIUM 20 MG/1
40 TABLET, DELAYED RELEASE ORAL DAILY
Qty: 0 | Refills: 0 | Status: DISCONTINUED | OUTPATIENT
Start: 2018-06-30 | End: 2018-07-03

## 2018-06-30 RX ORDER — POTASSIUM CHLORIDE 20 MEQ
10 PACKET (EA) ORAL
Qty: 0 | Refills: 0 | Status: DISCONTINUED | OUTPATIENT
Start: 2018-06-30 | End: 2018-06-30

## 2018-06-30 RX ADMIN — HYDROMORPHONE HYDROCHLORIDE 1 MILLIGRAM(S): 2 INJECTION INTRAMUSCULAR; INTRAVENOUS; SUBCUTANEOUS at 03:36

## 2018-06-30 RX ADMIN — Medication 1000 MILLIGRAM(S): at 11:30

## 2018-06-30 RX ADMIN — HYDROMORPHONE HYDROCHLORIDE 1 MILLIGRAM(S): 2 INJECTION INTRAMUSCULAR; INTRAVENOUS; SUBCUTANEOUS at 06:53

## 2018-06-30 RX ADMIN — LOSARTAN POTASSIUM 25 MILLIGRAM(S): 100 TABLET, FILM COATED ORAL at 06:06

## 2018-06-30 RX ADMIN — Medication 250 MILLIGRAM(S): at 06:06

## 2018-06-30 RX ADMIN — Medication 100 MILLIEQUIVALENT(S): at 16:38

## 2018-06-30 RX ADMIN — HYDROMORPHONE HYDROCHLORIDE 1 MILLIGRAM(S): 2 INJECTION INTRAMUSCULAR; INTRAVENOUS; SUBCUTANEOUS at 03:51

## 2018-06-30 RX ADMIN — HYDROMORPHONE HYDROCHLORIDE 1 MILLIGRAM(S): 2 INJECTION INTRAMUSCULAR; INTRAVENOUS; SUBCUTANEOUS at 10:07

## 2018-06-30 RX ADMIN — ERTAPENEM SODIUM 120 MILLIGRAM(S): 1 INJECTION, POWDER, LYOPHILIZED, FOR SOLUTION INTRAMUSCULAR; INTRAVENOUS at 11:15

## 2018-06-30 RX ADMIN — ONDANSETRON 4 MILLIGRAM(S): 8 TABLET, FILM COATED ORAL at 06:59

## 2018-06-30 RX ADMIN — HYDROMORPHONE HYDROCHLORIDE 0.5 MILLIGRAM(S): 2 INJECTION INTRAMUSCULAR; INTRAVENOUS; SUBCUTANEOUS at 23:40

## 2018-06-30 RX ADMIN — Medication 400 MILLIGRAM(S): at 23:50

## 2018-06-30 RX ADMIN — Medication 100 MILLIEQUIVALENT(S): at 15:47

## 2018-06-30 RX ADMIN — HYDROMORPHONE HYDROCHLORIDE 1 MILLIGRAM(S): 2 INJECTION INTRAMUSCULAR; INTRAVENOUS; SUBCUTANEOUS at 10:20

## 2018-06-30 RX ADMIN — Medication 400 MILLIGRAM(S): at 11:15

## 2018-06-30 RX ADMIN — HYDROMORPHONE HYDROCHLORIDE 1 MILLIGRAM(S): 2 INJECTION INTRAMUSCULAR; INTRAVENOUS; SUBCUTANEOUS at 07:09

## 2018-06-30 RX ADMIN — HYDROMORPHONE HYDROCHLORIDE 0.5 MILLIGRAM(S): 2 INJECTION INTRAMUSCULAR; INTRAVENOUS; SUBCUTANEOUS at 23:55

## 2018-06-30 NOTE — BRIEF OPERATIVE NOTE - POST-OP DX
Abdominopelvic abscess  06/25/2018    Active  Jhony King  Diverticulitis of large intestine with perforation and abscess without bleeding  06/30/2018    Active  Dl Pinto

## 2018-06-30 NOTE — PROGRESS NOTE ADULT - ASSESSMENT
This 52 y.o. man with sigmoid diverticulitis, found with collection anterior to the rectum (measuring 6.2 x 4.5 cm) and right hemipelvis (measuring 3.4 x 2.3 cm)

## 2018-06-30 NOTE — BRIEF OPERATIVE NOTE - PROCEDURE
<<-----Click on this checkbox to enter Procedure Re colectomy  06/30/2018    Active  DPHILLIBERT  Exploratory laparotomy with bowel resection  06/30/2018    Active  DPHILLIBERT

## 2018-06-30 NOTE — BRIEF OPERATIVE NOTE - OPERATION/FINDINGS
10 f drain transgluteal right into pelvic collection.  25 cc yellow fluid with debris aspirated.  cx and cell count sent
Donovan stool and pus throughout the abdomen Inflamed segmental changes in various parts of small intestine without evidence of ischemia

## 2018-06-30 NOTE — PROGRESS NOTE ADULT - SUBJECTIVE AND OBJECTIVE BOX
INTERVAL HPI/OVERNIGHT EVENTS: Febrile to 103.2 last night. CXR, UA negative. No output from IR drain. Tolerating diet yesterday without n/v. Pain about the same, controlled with medications.    MEDICATIONS  (STANDING):  docusate sodium 100 milliGRAM(s) Oral two times a day  enoxaparin Injectable 40 milliGRAM(s) SubCutaneous daily  ertapenem  IVPB 1000 milliGRAM(s) IV Intermittent every 24 hours  lactated ringers. 1000 milliLiter(s) (125 mL/Hr) IV Continuous <Continuous>  losartan 25 milliGRAM(s) Oral daily  pantoprazole    Tablet 40 milliGRAM(s) Oral before breakfast  potassium chloride  10 mEq/100 mL IVPB 10 milliEquivalent(s) IV Intermittent every 1 hour  saccharomyces boulardii 250 milliGRAM(s) Oral two times a day  senna 2 Tablet(s) Oral at bedtime    MEDICATIONS  (PRN):  acetaminophen   Tablet. 650 milliGRAM(s) Oral every 6 hours PRN Mild Pain (1 - 3)  HYDROmorphone  Injectable 0.5 milliGRAM(s) IV Push every 4 hours PRN breakthrough  ondansetron Injectable 4 milliGRAM(s) IV Push every 4 hours PRN Nausea and/or Vomiting  oxyCODONE    5 mG/acetaminophen 325 mG 1 Tablet(s) Oral every 4 hours PRN Moderate Pain (4 - 6)  oxyCODONE    5 mG/acetaminophen 325 mG 2 Tablet(s) Oral every 4 hours PRN Severe Pain (7 - 10)      Vital Signs Last 24 Hrs  T(C): 36.7 (2018 09:22), Max: 39.6 (2018 20:57)  T(F): 98 (2018 09:22), Max: 103.2 (2018 20:57)  HR: 71 (2018 09:22) (71 - 150)  BP: 136/93 (2018 09:22) (111/79 - 154/103)  BP(mean): --  RR: 18 (2018 09:22) (18 - 23)  SpO2: 100% (2018 09:22) (90% - 100%)    Physical exam:  General: NAD, AOx3, resting comfortably in bed  HEENT: PERRLA, EOMI  Neck: supple, nontender  Respiratory: no respiratory distress, lungs CTAB  Heart: regular rate and rhythm, no murmurs  Abdomen: soft, mild diffuse tenderness, moderately distended. Normal bowel sounds. No guarding or rebound.  Extremities: no peripheral edema. Normal ROM.      I&O's Detail    2018 07:01  -  2018 07:00  --------------------------------------------------------  IN:    lactated ringers.: 1000 mL    lactated ringers.: 1150 mL    Oral Fluid: 600 mL  Total IN: 2750 mL    OUT:    Voided: 1425 mL  Total OUT: 1425 mL    Total NET: 1325 mL          LABS:                        13.0   16.6  )-----------( 563      ( 2018 05:58 )             38.5     06-30    136  |  95<L>  |  9.0  ----------------------------<  128<H>  3.6   |  28.0  |  0.85    Ca    7.9<L>      2018 05:56  Phos  3.8     -30  Mg     2.1     -30        Urinalysis Basic - ( 2018 22:29 )    Color: Yellow / Appearance: Clear / S.010 / pH: x  Gluc: x / Ketone: Small  / Bili: Negative / Urobili: Negative mg/dL   Blood: x / Protein: 30 mg/dL / Nitrite: Negative   Leuk Esterase: Negative / RBC: 0-2 /HPF / WBC Negative   Sq Epi: x / Non Sq Epi: Occasional / Bacteria: x

## 2018-06-30 NOTE — PROGRESS NOTE ADULT - PROBLEM SELECTOR PLAN 1
s/p drainage of abscess with tube in place - minimal drainage in the bag  Continue Ertapenem 1 gram daily IV  Febrile  Will repeat blood cultures  Trend leukocytosis  Follow up repeat CT A/P

## 2018-06-30 NOTE — PROGRESS NOTE ADULT - SUBJECTIVE AND OBJECTIVE BOX
[facility]  INFECTIOUS DISEASES AND INTERNAL MEDICINE at [place]  =======================================================  Andres Lino MD  Diplomates American Board of Internal Medicine and Infectious Diseases  =======================================================    INNA KAYLEE [id number]  chief complaint:  follow up on abd abscess    s/p abscess drainage on 18 by IR    culture remain negative from abd collection      Febrile last night   Drain not draining as well  Awaiting repeat CT A/P      Allergies:  No Known Allergies      Antibiotics:  ertapenem  IVPB 1000 milliGRAM(s) IV Intermittent every 24 hours       REVIEW OF SYSTEMS:  as above  all other ROS are negative      Physical Exam:  Vital Signs Last 24 Hrs  T(C): 36.7 (2018 09:22), Max: 39.6 (2018 20:57)  T(F): 98 (2018 09:22), Max: 103.2 (2018 20:57)  HR: 71 (2018 09:22) (71 - 150)  BP: 136/93 (2018 09:22) (111/79 - 154/103)  RR: 18 (2018 09:22) (18 - 23)  SpO2: 100% (2018 09:22) (90% - 100%)      GEN: NAD, pleasant, obese  HEENT: normocephalic and atraumatic. EOMI. CANELO.    NECK: Supple.   LUNGS: Clear to auscultation.  HEART: Regular rate and rhythm    ABDOMEN:  Distended abd. Decreased BS, non tender.  RT sided drain in place. + Gaurding, No Rebound   EXTREMITIES: Without any edema.  MSK: no joint swelling  NEUROLOGIC: Cranial nerves II through XII are grossly intact.  PSYCHIATRIC: Appropriate affect .  SKIN: No Rash      Labs:      136  |  95<L>  |  9.0  ----------------------------<  128<H>  3.6   |  28.0  |  0.85    Ca    7.9<L>      2018 05:56  Phos  3.8       Mg     2.1                    13.0   16.6  )-----------( 563      ( 2018 05:58 )             38.5     Urinalysis Basic - ( 2018 22:29 )    Color: Yellow / Appearance: Clear / S.010 / pH: x  Gluc: x / Ketone: Small  / Bili: Negative / Urobili: Negative mg/dL   Blood: x / Protein: 30 mg/dL / Nitrite: Negative   Leuk Esterase: Negative / RBC: 0-2 /HPF / WBC Negative   Sq Epi: x / Non Sq Epi: Occasional / Bacteria: x      RECENT CULTURES:   @ 17:40 .Abscess pelvis Enterococcus avium    Growing in broth media only Enterococcus avium  Culture in progress  No White blood cells  No organisms seen       @ 00:53 .Blood Blood-Peripheral     No growth at 5 days.       @ 04:12 .Urine Clean Catch (Midstream)     <10,000 CFU/ml Gram Positive Cocci in Pairs and Chains       @ 01:06 .Blood Blood-Peripheral     No growth at 5 days.       @ 01:05 .Blood Blood-Peripheral     No growth at 5 days.

## 2018-06-30 NOTE — PROGRESS NOTE ADULT - ASSESSMENT
53 y/o M with Hinchey II diverticulitis s/p IR drainage. Febrile overnight, increasing leukocytosis. Concern for abscess.    Plan:  -CT a/p with IV/PO contrast  -Continue pain control  -NPO/IVF for imaging  -Encourage OOB, ambulation, IS  -Monitor I/Os, vitals  -Continue IV antibiotics  -DVT ppx

## 2018-06-30 NOTE — BRIEF OPERATIVE NOTE - PRE-OP DX
Abscess of peritoneum  06/25/2018    Active  Jhony King  Diverticulitis of large intestine with perforation and abscess without bleeding  06/30/2018    Active  Dl Pinto

## 2018-07-01 LAB
ANION GAP SERPL CALC-SCNC: 10 MMOL/L — SIGNIFICANT CHANGE UP (ref 5–17)
ANION GAP SERPL CALC-SCNC: 15 MMOL/L — SIGNIFICANT CHANGE UP (ref 5–17)
BASOPHILS # BLD AUTO: 0 K/UL — SIGNIFICANT CHANGE UP (ref 0–0.2)
BASOPHILS # BLD AUTO: 0 K/UL — SIGNIFICANT CHANGE UP (ref 0–0.2)
BASOPHILS NFR BLD AUTO: 0.1 % — SIGNIFICANT CHANGE UP (ref 0–2)
BUN SERPL-MCNC: 14 MG/DL — SIGNIFICANT CHANGE UP (ref 8–20)
BUN SERPL-MCNC: 17 MG/DL — SIGNIFICANT CHANGE UP (ref 8–20)
CALCIUM SERPL-MCNC: 8 MG/DL — LOW (ref 8.6–10.2)
CALCIUM SERPL-MCNC: 8.2 MG/DL — LOW (ref 8.6–10.2)
CHLORIDE SERPL-SCNC: 95 MMOL/L — LOW (ref 98–107)
CHLORIDE SERPL-SCNC: 97 MMOL/L — LOW (ref 98–107)
CO2 SERPL-SCNC: 25 MMOL/L — SIGNIFICANT CHANGE UP (ref 22–29)
CO2 SERPL-SCNC: 26 MMOL/L — SIGNIFICANT CHANGE UP (ref 22–29)
CREAT SERPL-MCNC: 1.23 MG/DL — SIGNIFICANT CHANGE UP (ref 0.5–1.3)
CREAT SERPL-MCNC: 1.32 MG/DL — HIGH (ref 0.5–1.3)
CULTURE RESULTS: SIGNIFICANT CHANGE UP
EOSINOPHIL # BLD AUTO: 0 K/UL — SIGNIFICANT CHANGE UP (ref 0–0.5)
EOSINOPHIL # BLD AUTO: 0 K/UL — SIGNIFICANT CHANGE UP (ref 0–0.5)
EOSINOPHIL NFR BLD AUTO: 0.1 % — SIGNIFICANT CHANGE UP (ref 0–5)
EOSINOPHIL NFR BLD AUTO: 0.2 % — SIGNIFICANT CHANGE UP (ref 0–5)
GAS PNL BLDA: SIGNIFICANT CHANGE UP
GLUCOSE SERPL-MCNC: 157 MG/DL — HIGH (ref 70–115)
GLUCOSE SERPL-MCNC: 163 MG/DL — HIGH (ref 70–115)
GRAM STN FLD: SIGNIFICANT CHANGE UP
GRAM STN FLD: SIGNIFICANT CHANGE UP
HCT VFR BLD CALC: 35.7 % — LOW (ref 42–52)
HCT VFR BLD CALC: 39.4 % — LOW (ref 42–52)
HGB BLD-MCNC: 11.8 G/DL — LOW (ref 14–18)
HGB BLD-MCNC: 13 G/DL — LOW (ref 14–18)
INR BLD: 1.28 RATIO — HIGH (ref 0.88–1.16)
LYMPHOCYTES # BLD AUTO: 0.8 K/UL — LOW (ref 1–4.8)
LYMPHOCYTES # BLD AUTO: 1.5 K/UL — SIGNIFICANT CHANGE UP (ref 1–4.8)
LYMPHOCYTES # BLD AUTO: 3.3 % — LOW (ref 20–55)
LYMPHOCYTES # BLD AUTO: 6.1 % — LOW (ref 20–55)
MAGNESIUM SERPL-MCNC: 1.7 MG/DL — SIGNIFICANT CHANGE UP (ref 1.6–2.6)
MAGNESIUM SERPL-MCNC: 1.9 MG/DL — SIGNIFICANT CHANGE UP (ref 1.6–2.6)
MCHC RBC-ENTMCNC: 28.8 PG — SIGNIFICANT CHANGE UP (ref 27–31)
MCHC RBC-ENTMCNC: 29 PG — SIGNIFICANT CHANGE UP (ref 27–31)
MCHC RBC-ENTMCNC: 33 G/DL — SIGNIFICANT CHANGE UP (ref 32–36)
MCHC RBC-ENTMCNC: 33.1 G/DL — SIGNIFICANT CHANGE UP (ref 32–36)
MCV RBC AUTO: 87.1 FL — SIGNIFICANT CHANGE UP (ref 80–94)
MCV RBC AUTO: 87.9 FL — SIGNIFICANT CHANGE UP (ref 80–94)
METHOD TYPE: SIGNIFICANT CHANGE UP
MONOCYTES # BLD AUTO: 0.7 K/UL — SIGNIFICANT CHANGE UP (ref 0–0.8)
MONOCYTES # BLD AUTO: 1.9 K/UL — HIGH (ref 0–0.8)
MONOCYTES NFR BLD AUTO: 2.8 % — LOW (ref 3–10)
MONOCYTES NFR BLD AUTO: 7.8 % — SIGNIFICANT CHANGE UP (ref 3–10)
NEUTROPHILS # BLD AUTO: 21.2 K/UL — HIGH (ref 1.8–8)
NEUTROPHILS # BLD AUTO: 22.2 K/UL — HIGH (ref 1.8–8)
NEUTROPHILS NFR BLD AUTO: 87.4 % — HIGH (ref 37–73)
NEUTROPHILS NFR BLD AUTO: 89.6 % — HIGH (ref 37–73)
ORGANISM # SPEC MICROSCOPIC CNT: SIGNIFICANT CHANGE UP
PHOSPHATE SERPL-MCNC: 5.5 MG/DL — HIGH (ref 2.4–4.7)
PLAT MORPH BLD: NORMAL — SIGNIFICANT CHANGE UP
PLATELET # BLD AUTO: 605 K/UL — HIGH (ref 150–400)
PLATELET # BLD AUTO: 608 K/UL — HIGH (ref 150–400)
POTASSIUM SERPL-MCNC: 4.2 MMOL/L — SIGNIFICANT CHANGE UP (ref 3.5–5.3)
POTASSIUM SERPL-MCNC: 4.3 MMOL/L — SIGNIFICANT CHANGE UP (ref 3.5–5.3)
POTASSIUM SERPL-SCNC: 4.2 MMOL/L — SIGNIFICANT CHANGE UP (ref 3.5–5.3)
POTASSIUM SERPL-SCNC: 4.3 MMOL/L — SIGNIFICANT CHANGE UP (ref 3.5–5.3)
PROTHROM AB SERPL-ACNC: 14.1 SEC — HIGH (ref 9.8–12.7)
RBC # BLD: 4.1 M/UL — LOW (ref 4.6–6.2)
RBC # BLD: 4.48 M/UL — LOW (ref 4.6–6.2)
RBC # FLD: 13.2 % — SIGNIFICANT CHANGE UP (ref 11–15.6)
RBC # FLD: 13.4 % — SIGNIFICANT CHANGE UP (ref 11–15.6)
RBC BLD AUTO: NORMAL — SIGNIFICANT CHANGE UP
SODIUM SERPL-SCNC: 133 MMOL/L — LOW (ref 135–145)
SODIUM SERPL-SCNC: 135 MMOL/L — SIGNIFICANT CHANGE UP (ref 135–145)
SPECIMEN SOURCE: SIGNIFICANT CHANGE UP
WBC # BLD: 24.3 K/UL — HIGH (ref 4.8–10.8)
WBC # BLD: 24.8 K/UL — HIGH (ref 4.8–10.8)
WBC # FLD AUTO: 24.3 K/UL — HIGH (ref 4.8–10.8)
WBC # FLD AUTO: 24.8 K/UL — HIGH (ref 4.8–10.8)

## 2018-07-01 PROCEDURE — 71045 X-RAY EXAM CHEST 1 VIEW: CPT | Mod: 26

## 2018-07-01 PROCEDURE — 99232 SBSQ HOSP IP/OBS MODERATE 35: CPT

## 2018-07-01 RX ORDER — SODIUM CHLORIDE 9 MG/ML
1000 INJECTION, SOLUTION INTRAVENOUS ONCE
Qty: 0 | Refills: 0 | Status: COMPLETED | OUTPATIENT
Start: 2018-07-01 | End: 2018-07-01

## 2018-07-01 RX ORDER — ACETAMINOPHEN 500 MG
1000 TABLET ORAL ONCE
Qty: 0 | Refills: 0 | Status: DISCONTINUED | OUTPATIENT
Start: 2018-07-01 | End: 2018-07-03

## 2018-07-01 RX ORDER — HYDROMORPHONE HYDROCHLORIDE 2 MG/ML
0.5 INJECTION INTRAMUSCULAR; INTRAVENOUS; SUBCUTANEOUS EVERY 4 HOURS
Qty: 0 | Refills: 0 | Status: DISCONTINUED | OUTPATIENT
Start: 2018-07-01 | End: 2018-07-03

## 2018-07-01 RX ORDER — MAGNESIUM SULFATE 500 MG/ML
1 VIAL (ML) INJECTION ONCE
Qty: 0 | Refills: 0 | Status: COMPLETED | OUTPATIENT
Start: 2018-07-01 | End: 2018-07-01

## 2018-07-01 RX ORDER — CALCIUM GLUCONATE 100 MG/ML
2 VIAL (ML) INTRAVENOUS ONCE
Qty: 0 | Refills: 0 | Status: COMPLETED | OUTPATIENT
Start: 2018-07-01 | End: 2018-07-01

## 2018-07-01 RX ADMIN — Medication 100 MILLIGRAM(S): at 05:23

## 2018-07-01 RX ADMIN — Medication 200 GRAM(S): at 03:20

## 2018-07-01 RX ADMIN — Medication 1000 MILLIGRAM(S): at 00:33

## 2018-07-01 RX ADMIN — HYDROMORPHONE HYDROCHLORIDE 0.5 MILLIGRAM(S): 2 INJECTION INTRAMUSCULAR; INTRAVENOUS; SUBCUTANEOUS at 19:23

## 2018-07-01 RX ADMIN — SODIUM CHLORIDE 125 MILLILITER(S): 9 INJECTION, SOLUTION INTRAVENOUS at 19:07

## 2018-07-01 RX ADMIN — SODIUM CHLORIDE 100 MILLILITER(S): 9 INJECTION, SOLUTION INTRAVENOUS at 00:33

## 2018-07-01 RX ADMIN — HYDROMORPHONE HYDROCHLORIDE 0.5 MILLIGRAM(S): 2 INJECTION INTRAMUSCULAR; INTRAVENOUS; SUBCUTANEOUS at 09:35

## 2018-07-01 RX ADMIN — ENOXAPARIN SODIUM 40 MILLIGRAM(S): 100 INJECTION SUBCUTANEOUS at 13:49

## 2018-07-01 RX ADMIN — HYDROMORPHONE HYDROCHLORIDE 0.5 MILLIGRAM(S): 2 INJECTION INTRAMUSCULAR; INTRAVENOUS; SUBCUTANEOUS at 23:37

## 2018-07-01 RX ADMIN — SODIUM CHLORIDE 1000 MILLILITER(S): 9 INJECTION, SOLUTION INTRAVENOUS at 00:30

## 2018-07-01 RX ADMIN — CEFEPIME 100 MILLIGRAM(S): 1 INJECTION, POWDER, FOR SOLUTION INTRAMUSCULAR; INTRAVENOUS at 14:04

## 2018-07-01 RX ADMIN — HYDROMORPHONE HYDROCHLORIDE 0.5 MILLIGRAM(S): 2 INJECTION INTRAMUSCULAR; INTRAVENOUS; SUBCUTANEOUS at 00:33

## 2018-07-01 RX ADMIN — HYDROMORPHONE HYDROCHLORIDE 0.5 MILLIGRAM(S): 2 INJECTION INTRAMUSCULAR; INTRAVENOUS; SUBCUTANEOUS at 23:22

## 2018-07-01 RX ADMIN — SODIUM CHLORIDE 1000 MILLILITER(S): 9 INJECTION, SOLUTION INTRAVENOUS at 22:03

## 2018-07-01 RX ADMIN — Medication 100 GRAM(S): at 02:30

## 2018-07-01 RX ADMIN — SODIUM CHLORIDE 125 MILLILITER(S): 9 INJECTION, SOLUTION INTRAVENOUS at 01:10

## 2018-07-01 RX ADMIN — HYDROMORPHONE HYDROCHLORIDE 0.5 MILLIGRAM(S): 2 INJECTION INTRAMUSCULAR; INTRAVENOUS; SUBCUTANEOUS at 04:00

## 2018-07-01 RX ADMIN — HYDROMORPHONE HYDROCHLORIDE 0.5 MILLIGRAM(S): 2 INJECTION INTRAMUSCULAR; INTRAVENOUS; SUBCUTANEOUS at 13:45

## 2018-07-01 RX ADMIN — PANTOPRAZOLE SODIUM 40 MILLIGRAM(S): 20 TABLET, DELAYED RELEASE ORAL at 13:53

## 2018-07-01 RX ADMIN — CEFEPIME 100 MILLIGRAM(S): 1 INJECTION, POWDER, FOR SOLUTION INTRAMUSCULAR; INTRAVENOUS at 23:20

## 2018-07-01 RX ADMIN — Medication 100 MILLIGRAM(S): at 23:20

## 2018-07-01 RX ADMIN — Medication 100 MILLIGRAM(S): at 14:04

## 2018-07-01 RX ADMIN — Medication 100 MILLIGRAM(S): at 12:30

## 2018-07-01 RX ADMIN — HYDROMORPHONE HYDROCHLORIDE 0.5 MILLIGRAM(S): 2 INJECTION INTRAMUSCULAR; INTRAVENOUS; SUBCUTANEOUS at 19:06

## 2018-07-01 RX ADMIN — SODIUM CHLORIDE 2000 MILLILITER(S): 9 INJECTION, SOLUTION INTRAVENOUS at 09:35

## 2018-07-01 RX ADMIN — HYDROMORPHONE HYDROCHLORIDE 0.5 MILLIGRAM(S): 2 INJECTION INTRAMUSCULAR; INTRAVENOUS; SUBCUTANEOUS at 19:28

## 2018-07-01 RX ADMIN — HYDROMORPHONE HYDROCHLORIDE 0.5 MILLIGRAM(S): 2 INJECTION INTRAMUSCULAR; INTRAVENOUS; SUBCUTANEOUS at 19:11

## 2018-07-01 RX ADMIN — HYDROMORPHONE HYDROCHLORIDE 0.5 MILLIGRAM(S): 2 INJECTION INTRAMUSCULAR; INTRAVENOUS; SUBCUTANEOUS at 13:47

## 2018-07-01 RX ADMIN — HYDROMORPHONE HYDROCHLORIDE 0.5 MILLIGRAM(S): 2 INJECTION INTRAMUSCULAR; INTRAVENOUS; SUBCUTANEOUS at 10:00

## 2018-07-01 RX ADMIN — CEFEPIME 100 MILLIGRAM(S): 1 INJECTION, POWDER, FOR SOLUTION INTRAMUSCULAR; INTRAVENOUS at 12:30

## 2018-07-01 RX ADMIN — CEFEPIME 100 MILLIGRAM(S): 1 INJECTION, POWDER, FOR SOLUTION INTRAMUSCULAR; INTRAVENOUS at 05:22

## 2018-07-01 NOTE — PROGRESS NOTE ADULT - ASSESSMENT
52yoM POD#1 for michael's procedure  - NPO and IVF  - IV pain mgmt  - Bowel rest  - Change dressing 7/2  - Monitor ostomy output  - DVT ppx  - OOB and ambulate

## 2018-07-01 NOTE — PROGRESS NOTE ADULT - SUBJECTIVE AND OBJECTIVE BOX
[facility]  INFECTIOUS DISEASES AND INTERNAL MEDICINE at [place]  =======================================================  Andres Lino MD  Diplomates American Board of Internal Medicine and Infectious Diseases  =======================================================    KAYLEE KEITH [id number]  chief complaint:  follow up on abd abscess  s/p abscess drainage on 18 by IR  s/p exploratory laparotomy with bowel resection and Re procedure 18    Afebrile    c/o pain at surgical site    Allergies:  No Known Allergies      Antibiotics:  cefepime   IVPB 2000 milliGRAM(s) IV Intermittent every 8 hours  metroNIDAZOLE  IVPB 500 milliGRAM(s) IV Intermittent every 8 hours       REVIEW OF SYSTEMS:  as above  all other ROS are negative      Physical Exam:  Vital Signs Last 24 Hrs  T(C): 37 (2018 03:30), Max: 37 (2018 03:30)  T(F): 98.6 (2018 03:30), Max: 98.6 (2018 03:30)  HR: 70 (2018 03:30) (70 - 98)  BP: 120/63 (2018 03:30) (75/46 - 136/93)  BP(mean): 73 (2018 03:00) (53 - 73)  RR: 17 (2018 03:30) (14 - 21)  SpO2: 97% (2018 03:30) (91% - 100%)      GEN: NAD, pleasant, obese  HEENT: normocephalic and atraumatic. EOMI. CANELO.    NECK: Supple.   LUNGS: Clear to auscultation.  HEART: Regular rate and rhythm    ABDOMEN:  Distended abd. Decreased BS, non tender.  RT sided drain in place. + Guarding No Rebound   EXTREMITIES: Without any edema.  MSK: no joint swelling  NEUROLOGIC: Cranial nerves II through XII are grossly intact.  PSYCHIATRIC: Appropriate affect .  SKIN: No Rash      Labs:      133<L>  |  97<L>  |  17.0  ----------------------------<  157<H>  4.3   |  26.0  |  1.23    Ca    8.0<L>      2018 06:50  Phos  5.5       Mg     1.9                    11.8   24.3  )-----------( 605      ( 2018 06:50 )             35.7       PT/INR - ( 2018 00:11 )   PT: 14.1 sec;   INR: 1.28 ratio         PTT - ( 2018 15:37 )  PTT:27.8 sec  Urinalysis Basic - ( 2018 22:29 )    Color: Yellow / Appearance: Clear / S.010 / pH: x  Gluc: x / Ketone: Small  / Bili: Negative / Urobili: Negative mg/dL   Blood: x / Protein: 30 mg/dL / Nitrite: Negative   Leuk Esterase: Negative / RBC: 0-2 /HPF / WBC Negative   Sq Epi: x / Non Sq Epi: Occasional / Bacteria: x      ABG - ( 2018 02:15 )  pH, Arterial: 7.41  pH, Blood: x     /  pCO2: 46    /  pO2: 81    / HCO3: 27    / Base Excess: 3.3   /  SaO2: 96          RECENT CULTURES:   @ 17:40 .Abscess pelvis Enterococcus avium    Enterococcus avium Growing in broth media only  Culture in progress  No White blood cells  No organisms seen       @ 00:53 .Blood Blood-Peripheral     No growth at 5 days.       @ 04:12 .Urine Clean Catch (Midstream)     <10,000 CFU/ml Gram Positive Cocci in Pairs and Chains       @ 01:06 .Blood Blood-Peripheral     No growth at 5 days.       @ 01:05 .Blood Blood-Peripheral     No growth at 5 days.          EXAM:  CT ABDOMEN AND PELVIS OC IC                        PROCEDURE DATE:  2018    INTERPRETATION:  Contrast enhanced CT of the abdomen and pelvis .  COMPARISON: 2018.  CLINICAL HISTORY: Pelvic abscess. Follow-up.  Technique: contiguous axial images were obtained with 2.5 mm slice   thickness after intravenous and oral contrast administration.  Coronal and sagittal reformats were also submitted for interpretation.  100 ml of Omnipaque were injected intravenously, and 0ml were discarded,   without complications noted.   FINDINGS:   Compared to prior examination right pigtail low pelvic transgluteal   catheter drains prior abscess cavity. However there is a new large   abscess cavity on the mesenteric side of the sigmoid diverticulitis   abscess measuring 12.6 cm in length and containing contrast consistent   with a sigmoid colon perforation secondary to severe sigmoid   diverticulitis. Abscess cavity measures 7.8 cm in diameter.. There is a   intraperitoneal air consistent with a perforated diverticulitis overlying   the abdomen and the surrounding liver. Is air in the Crowell's pouch .  Remaining large bowel, small bowel, duodenal sweep and stomach are   normal. There is a complex abdominal ascites surrounding the stranding   Liver Spleen and extending along right paracolic gutter and pelvis . .  The liver, spleen, pancreas, adrenal glands, gallbladder are normal.  There is no intra or extrahepatic biliary ductal dilatation.  Both kidneys show normal uptake of contrast media without masses or hydronephrosis.    The urinary bladder shows normal morphology and contour.  Prostate and seminal vesicles within normal limits.  There are no retroperitoneal masses or abnormal lymphadenopathy.  The retroperitoneal vessels are normal.    The bones and soft tissues are intact.  IMPRESSION:   Perforated diverticulitis with 12 cm abscess, free peritoneal air or fluid.

## 2018-07-01 NOTE — PROGRESS NOTE ADULT - SUBJECTIVE AND OBJECTIVE BOX
INTERVAL HPI/OVERNIGHT EVENTS:    SUBJECTIVE:  s/p ex lap and michael's procedure for perforation and hinchey 4 diverticulitis. Pain well controlled. Ngt draining bilious fluid. Rogers with clear yellow urine.    MEDICATIONS  (STANDING):  cefepime   IVPB 2000 milliGRAM(s) IV Intermittent every 8 hours  cefepime   IVPB      enoxaparin Injectable 40 milliGRAM(s) SubCutaneous daily  lactated ringers. 1000 milliLiter(s) (125 mL/Hr) IV Continuous <Continuous>  metroNIDAZOLE  IVPB 500 milliGRAM(s) IV Intermittent every 8 hours  metroNIDAZOLE  IVPB      pantoprazole  Injectable 40 milliGRAM(s) IV Push daily    MEDICATIONS  (PRN):  acetaminophen  IVPB. 1000 milliGRAM(s) IV Intermittent once PRN Mild to moderate pain  HYDROmorphone  Injectable 0.5 milliGRAM(s) IV Push every 4 hours PRN breakthrough  HYDROmorphone  Injectable 0.5 milliGRAM(s) IV Push every 4 hours PRN Severe Pain (7 - 10)  ondansetron Injectable 4 milliGRAM(s) IV Push every 4 hours PRN Nausea and/or Vomiting      Vital Signs Last 24 Hrs  T(C): 37 (2018 03:30), Max: 37 (2018 03:30)  T(F): 98.6 (2018 03:30), Max: 98.6 (2018 03:30)  HR: 70 (2018 03:30) (70 - 98)  BP: 120/63 (2018 03:30) (75/46 - 121/80)  BP(mean): 73 (2018 03:00) (53 - 73)  RR: 17 (2018 03:30) (14 - 21)  SpO2: 97% (2018 03:30) (91% - 97%)    PE  Gen: NAD, uncomfortable  Pulm: CTAB  CV: RRR  Abd: Soft,. TTP along incisional site. Dressings intact. No evidence of output from ostomy. Anthony with serosanguinous drainage  : Rogers with clear yellow urine  Ext: Moving all 4 ext      I&O's Detail    2018 07:01  -  2018 07:00  --------------------------------------------------------  IN:    Lactated Ringers IV Bolus: 2000 mL    lactated ringers.: 750 mL    Solution: 100 mL  Total IN: 2850 mL    OUT:    Drain: 160 mL    Indwelling Catheter - Urethral: 405 mL  Total OUT: 565 mL    Total NET: 2285 mL          LABS:                        11.8   24.3  )-----------( 605      ( 2018 06:50 )             35.7     07-01    133<L>  |  97<L>  |  17.0  ----------------------------<  157<H>  4.3   |  26.0  |  1.23    Ca    8.0<L>      2018 06:50  Phos  5.5     07-  Mg     1.9     07      PT/INR - ( 2018 00:11 )   PT: 14.1 sec;   INR: 1.28 ratio         PTT - ( 2018 15:37 )  PTT:27.8 sec  Urinalysis Basic - ( 2018 22:29 )    Color: Yellow / Appearance: Clear / S.010 / pH: x  Gluc: x / Ketone: Small  / Bili: Negative / Urobili: Negative mg/dL   Blood: x / Protein: 30 mg/dL / Nitrite: Negative   Leuk Esterase: Negative / RBC: 0-2 /HPF / WBC Negative   Sq Epi: x / Non Sq Epi: Occasional / Bacteria: x        RADIOLOGY & ADDITIONAL STUDIES:

## 2018-07-01 NOTE — PROGRESS NOTE ADULT - ASSESSMENT
This 52 y.o. man with sigmoid diverticulitis, found with collection anterior to the rectum (measuring 6.2 x 4.5 cm) and right hemipelvis (measuring 3.4 x 2.3 cm)    s/p exploratory laparotomy with bowel resection and Re procedure 6/30/18

## 2018-07-01 NOTE — PROGRESS NOTE ADULT - SUBJECTIVE AND OBJECTIVE BOX
52y/0 male s/p exploratory laparotomy partial colectomy, colostomy. doing well, vss pain controlled.  No anesthetic complications noted at this time.

## 2018-07-01 NOTE — PROGRESS NOTE ADULT - PROBLEM SELECTOR PLAN 1
s/p drainage of abscess with tube in place - minimal drainage in the bag  Repeat CT with Free air  Patient is now s/p exploratory laparotomy with bowel resection and Re procedure 6/30/18  Repeat blood cultures pending  Trend leukocytosis  Started on Cefepime and Flagyl post op  Afebrile  Continue antibiotics

## 2018-07-01 NOTE — CHART NOTE - NSCHARTNOTEFT_GEN_A_CORE
Pt hypotensive post op with oliguria and narrow pulse pressure.  Pt mentating well with c/o of mild abdominal pain.  2 L IVF administered with resolution of hypotension and improved UOP.  ABG with normal pH and with no elevation in lactic acid or base deficit.  Currently meeting all end points.  Stoma pink, viable.  Clinically stable at this time.

## 2018-07-01 NOTE — PROGRESS NOTE ADULT - PROBLEM SELECTOR PLAN 2
cultures negative so far  will follow
cultures negative so far  will follow
recurrent fever repaet blood cx ordered last night   consider ID evaluation
controlled cont home medication
s/p exploratory laparotomy with bowel resection and Re procedure 6/30/18  Follow up cultures  Antibiotics as above
cultures negative so far  will follow
cultures negative so far  will follow

## 2018-07-02 LAB
ANION GAP SERPL CALC-SCNC: 12 MMOL/L — SIGNIFICANT CHANGE UP (ref 5–17)
ANISOCYTOSIS BLD QL: SLIGHT — SIGNIFICANT CHANGE UP
BUN SERPL-MCNC: 18 MG/DL — SIGNIFICANT CHANGE UP (ref 8–20)
CALCIUM SERPL-MCNC: 7.5 MG/DL — LOW (ref 8.6–10.2)
CHLORIDE SERPL-SCNC: 98 MMOL/L — SIGNIFICANT CHANGE UP (ref 98–107)
CO2 SERPL-SCNC: 26 MMOL/L — SIGNIFICANT CHANGE UP (ref 22–29)
CREAT SERPL-MCNC: 0.99 MG/DL — SIGNIFICANT CHANGE UP (ref 0.5–1.3)
GLUCOSE SERPL-MCNC: 121 MG/DL — HIGH (ref 70–115)
HCT VFR BLD CALC: 31.1 % — LOW (ref 42–52)
HGB BLD-MCNC: 10.4 G/DL — LOW (ref 14–18)
LYMPHOCYTES # BLD AUTO: 3 % — LOW (ref 20–55)
MAGNESIUM SERPL-MCNC: 1.9 MG/DL — SIGNIFICANT CHANGE UP (ref 1.8–2.6)
MCHC RBC-ENTMCNC: 28.9 PG — SIGNIFICANT CHANGE UP (ref 27–31)
MCHC RBC-ENTMCNC: 33.4 G/DL — SIGNIFICANT CHANGE UP (ref 32–36)
MCV RBC AUTO: 86.4 FL — SIGNIFICANT CHANGE UP (ref 80–94)
MICROCYTES BLD QL: SLIGHT — SIGNIFICANT CHANGE UP
MONOCYTES NFR BLD AUTO: 5 % — SIGNIFICANT CHANGE UP (ref 3–10)
NEUTROPHILS NFR BLD AUTO: 92 % — HIGH (ref 37–73)
OVALOCYTES BLD QL SMEAR: SLIGHT — SIGNIFICANT CHANGE UP
PHOSPHATE SERPL-MCNC: 3.6 MG/DL — SIGNIFICANT CHANGE UP (ref 2.4–4.7)
PLAT MORPH BLD: NORMAL — SIGNIFICANT CHANGE UP
PLATELET # BLD AUTO: 623 K/UL — HIGH (ref 150–400)
POIKILOCYTOSIS BLD QL AUTO: SLIGHT — SIGNIFICANT CHANGE UP
POTASSIUM SERPL-MCNC: 4.2 MMOL/L — SIGNIFICANT CHANGE UP (ref 3.5–5.3)
POTASSIUM SERPL-SCNC: 4.2 MMOL/L — SIGNIFICANT CHANGE UP (ref 3.5–5.3)
RBC # BLD: 3.6 M/UL — LOW (ref 4.6–6.2)
RBC # FLD: 13.7 % — SIGNIFICANT CHANGE UP (ref 11–15.6)
RBC BLD AUTO: ABNORMAL
SODIUM SERPL-SCNC: 136 MMOL/L — SIGNIFICANT CHANGE UP (ref 135–145)
WBC # BLD: 18.9 K/UL — HIGH (ref 4.8–10.8)
WBC # FLD AUTO: 18.9 K/UL — HIGH (ref 4.8–10.8)

## 2018-07-02 PROCEDURE — 74018 RADEX ABDOMEN 1 VIEW: CPT | Mod: 26

## 2018-07-02 RX ORDER — MAGNESIUM SULFATE 500 MG/ML
1 VIAL (ML) INJECTION ONCE
Qty: 0 | Refills: 0 | Status: COMPLETED | OUTPATIENT
Start: 2018-07-02 | End: 2018-07-02

## 2018-07-02 RX ADMIN — Medication 100 MILLIGRAM(S): at 14:17

## 2018-07-02 RX ADMIN — HYDROMORPHONE HYDROCHLORIDE 0.5 MILLIGRAM(S): 2 INJECTION INTRAMUSCULAR; INTRAVENOUS; SUBCUTANEOUS at 03:53

## 2018-07-02 RX ADMIN — HYDROMORPHONE HYDROCHLORIDE 0.5 MILLIGRAM(S): 2 INJECTION INTRAMUSCULAR; INTRAVENOUS; SUBCUTANEOUS at 07:47

## 2018-07-02 RX ADMIN — CEFEPIME 100 MILLIGRAM(S): 1 INJECTION, POWDER, FOR SOLUTION INTRAMUSCULAR; INTRAVENOUS at 14:17

## 2018-07-02 RX ADMIN — HYDROMORPHONE HYDROCHLORIDE 0.5 MILLIGRAM(S): 2 INJECTION INTRAMUSCULAR; INTRAVENOUS; SUBCUTANEOUS at 03:38

## 2018-07-02 RX ADMIN — HYDROMORPHONE HYDROCHLORIDE 0.5 MILLIGRAM(S): 2 INJECTION INTRAMUSCULAR; INTRAVENOUS; SUBCUTANEOUS at 08:11

## 2018-07-02 RX ADMIN — Medication 100 MILLIGRAM(S): at 05:06

## 2018-07-02 RX ADMIN — Medication 100 MILLIGRAM(S): at 21:41

## 2018-07-02 RX ADMIN — PANTOPRAZOLE SODIUM 40 MILLIGRAM(S): 20 TABLET, DELAYED RELEASE ORAL at 12:09

## 2018-07-02 RX ADMIN — CEFEPIME 100 MILLIGRAM(S): 1 INJECTION, POWDER, FOR SOLUTION INTRAMUSCULAR; INTRAVENOUS at 05:06

## 2018-07-02 RX ADMIN — CEFEPIME 100 MILLIGRAM(S): 1 INJECTION, POWDER, FOR SOLUTION INTRAMUSCULAR; INTRAVENOUS at 21:41

## 2018-07-02 RX ADMIN — SODIUM CHLORIDE 125 MILLILITER(S): 9 INJECTION, SOLUTION INTRAVENOUS at 12:43

## 2018-07-02 RX ADMIN — ENOXAPARIN SODIUM 40 MILLIGRAM(S): 100 INJECTION SUBCUTANEOUS at 12:09

## 2018-07-02 RX ADMIN — Medication 100 GRAM(S): at 12:43

## 2018-07-02 NOTE — PROGRESS NOTE ADULT - SUBJECTIVE AND OBJECTIVE BOX
ACS/Trauma Brief Prog Note    NGT advanced 10cm, patient tolerated well. Confirmatory KUB pending.    Jamaal Recinos MD  R1 General Surgery

## 2018-07-02 NOTE — CHART NOTE - NSCHARTNOTEFT_GEN_A_CORE
Rn called for midline wound dressing to be changed, as the dressing began to come off the distal part of the wound at the abdominal fold. Wound changed, well vascularized, no odor, no fibrinous tissue, suture exposed. Patient tolerated change well, most distal part of wound reinforced with abdominal pads.

## 2018-07-02 NOTE — PROGRESS NOTE ADULT - SUBJECTIVE AND OBJECTIVE BOX
HPI/OVERNIGHT EVENTS:  NGT out overnight and replaced. Outputting ~300cc bilious fluid. Dressing replaced. Rogers with clear yellow urine    MEDICATIONS  (STANDING):  cefepime   IVPB 2000 milliGRAM(s) IV Intermittent every 8 hours  cefepime   IVPB      enoxaparin Injectable 40 milliGRAM(s) SubCutaneous daily  lactated ringers. 1000 milliLiter(s) (125 mL/Hr) IV Continuous <Continuous>  metroNIDAZOLE  IVPB 500 milliGRAM(s) IV Intermittent every 8 hours  metroNIDAZOLE  IVPB      pantoprazole  Injectable 40 milliGRAM(s) IV Push daily    MEDICATIONS  (PRN):  acetaminophen  IVPB. 1000 milliGRAM(s) IV Intermittent once PRN Mild to moderate pain  HYDROmorphone  Injectable 0.5 milliGRAM(s) IV Push every 4 hours PRN breakthrough  HYDROmorphone  Injectable 0.5 milliGRAM(s) IV Push every 4 hours PRN Severe Pain (7 - 10)  ondansetron Injectable 4 milliGRAM(s) IV Push every 4 hours PRN Nausea and/or Vomiting      Vital Signs Last 24 Hrs  T(C): 37.4 (02 Jul 2018 16:02), Max: 38.1 (02 Jul 2018 04:58)  T(F): 99.4 (02 Jul 2018 16:02), Max: 100.5 (02 Jul 2018 04:58)  HR: 93 (02 Jul 2018 16:02) (80 - 93)  BP: 114/82 (02 Jul 2018 16:02) (88/63 - 114/82)  BP(mean): --  RR: 18 (02 Jul 2018 16:02) (18 - 20)  SpO2: 93% (02 Jul 2018 16:02) (93% - 96%)    Constitutional: NAD  HEENT: EOMI   Respiratory: respirations are unlabored, no accessory muscle use, no conversational dyspnea  Cardiovascular: regular rate & rhythm  Gastrointestinal: soft, non-distended. tender to palpation on incisional site. Granulation tissue present without signs of infection. Dressing intact. Anthony drain with serosanginuous drainage  Neurological: GCS: 15 (4/5/6). A&O x 3  Musculoskeletal: No joint pain, swelling or deformity; no limitation of movement      I&O's Detail    01 Jul 2018 07:01  -  02 Jul 2018 07:00  --------------------------------------------------------  IN:    Lactated Ringers IV Bolus: 1000 mL    lactated ringers.: 500 mL  Total IN: 1500 mL    OUT:    Drain: 70 mL    Indwelling Catheter - Urethral: 1350 mL    Nasoenteral Tube: 300 mL  Total OUT: 1720 mL    Total NET: -220 mL          LABS:                        10.4   18.9  )-----------( 623      ( 02 Jul 2018 02:28 )             31.1     07-02    136  |  98  |  18.0  ----------------------------<  121<H>  4.2   |  26.0  |  0.99    Ca    7.5<L>      02 Jul 2018 02:28  Phos  3.6     07-02  Mg     1.9     07-02      PT/INR - ( 01 Jul 2018 00:11 )   PT: 14.1 sec;   INR: 1.28 ratio

## 2018-07-02 NOTE — PROGRESS NOTE ADULT - ASSESSMENT
51yo male s/p ex-lap and Re's POD #2.   - Continue NPO and IVF  - Pain control  - Bowel rest  - Daily dressing change  - Monitor ostomy output and NGT output  - D/C almodovar, and voiding trial  - DVT ppx - lovenox  - IS use, OOB, ambulate

## 2018-07-03 LAB
ANION GAP SERPL CALC-SCNC: 17 MMOL/L — SIGNIFICANT CHANGE UP (ref 5–17)
BASOPHILS # BLD AUTO: 0 K/UL — SIGNIFICANT CHANGE UP (ref 0–0.2)
BUN SERPL-MCNC: 15 MG/DL — SIGNIFICANT CHANGE UP (ref 8–20)
CALCIUM SERPL-MCNC: 7.4 MG/DL — LOW (ref 8.6–10.2)
CHLORIDE SERPL-SCNC: 97 MMOL/L — LOW (ref 98–107)
CO2 SERPL-SCNC: 24 MMOL/L — SIGNIFICANT CHANGE UP (ref 22–29)
CREAT SERPL-MCNC: 0.9 MG/DL — SIGNIFICANT CHANGE UP (ref 0.5–1.3)
EOSINOPHIL # BLD AUTO: 0.3 K/UL — SIGNIFICANT CHANGE UP (ref 0–0.5)
EOSINOPHIL NFR BLD AUTO: 3 % — SIGNIFICANT CHANGE UP (ref 0–6)
GLUCOSE SERPL-MCNC: 99 MG/DL — SIGNIFICANT CHANGE UP (ref 70–115)
HCT VFR BLD CALC: 30.3 % — LOW (ref 42–52)
HGB BLD-MCNC: 9.9 G/DL — LOW (ref 14–18)
LYMPHOCYTES # BLD AUTO: 1 K/UL — SIGNIFICANT CHANGE UP (ref 1–4.8)
LYMPHOCYTES # BLD AUTO: 8 % — LOW (ref 20–55)
MAGNESIUM SERPL-MCNC: 2.1 MG/DL — SIGNIFICANT CHANGE UP (ref 1.6–2.6)
MCHC RBC-ENTMCNC: 28.5 PG — SIGNIFICANT CHANGE UP (ref 27–31)
MCHC RBC-ENTMCNC: 32.7 G/DL — SIGNIFICANT CHANGE UP (ref 32–36)
MCV RBC AUTO: 87.3 FL — SIGNIFICANT CHANGE UP (ref 80–94)
MONOCYTES # BLD AUTO: 1.1 K/UL — HIGH (ref 0–0.8)
MONOCYTES NFR BLD AUTO: 7 % — SIGNIFICANT CHANGE UP (ref 3–10)
NEUTROPHILS # BLD AUTO: 9.4 K/UL — HIGH (ref 1.8–8)
NEUTROPHILS NFR BLD AUTO: 80 % — HIGH (ref 37–73)
NEUTS BAND # BLD: 2 % — SIGNIFICANT CHANGE UP (ref 0–8)
PHOSPHATE SERPL-MCNC: 2.1 MG/DL — LOW (ref 2.4–4.7)
PLAT MORPH BLD: NORMAL — SIGNIFICANT CHANGE UP
PLATELET # BLD AUTO: 672 K/UL — HIGH (ref 150–400)
POTASSIUM SERPL-MCNC: 4.1 MMOL/L — SIGNIFICANT CHANGE UP (ref 3.5–5.3)
POTASSIUM SERPL-SCNC: 4.1 MMOL/L — SIGNIFICANT CHANGE UP (ref 3.5–5.3)
RBC # BLD: 3.47 M/UL — LOW (ref 4.6–6.2)
RBC # FLD: 13.7 % — SIGNIFICANT CHANGE UP (ref 11–15.6)
RBC BLD AUTO: SIGNIFICANT CHANGE UP
SODIUM SERPL-SCNC: 138 MMOL/L — SIGNIFICANT CHANGE UP (ref 135–145)
WBC # BLD: 12 K/UL — HIGH (ref 4.8–10.8)
WBC # FLD AUTO: 12 K/UL — HIGH (ref 4.8–10.8)

## 2018-07-03 PROCEDURE — 93971 EXTREMITY STUDY: CPT | Mod: 26,LT

## 2018-07-03 PROCEDURE — 99232 SBSQ HOSP IP/OBS MODERATE 35: CPT

## 2018-07-03 RX ORDER — ACETAMINOPHEN 500 MG
650 TABLET ORAL EVERY 6 HOURS
Qty: 0 | Refills: 0 | Status: DISCONTINUED | OUTPATIENT
Start: 2018-07-03 | End: 2018-07-06

## 2018-07-03 RX ORDER — OXYCODONE HYDROCHLORIDE 5 MG/1
5 TABLET ORAL EVERY 4 HOURS
Qty: 0 | Refills: 0 | Status: DISCONTINUED | OUTPATIENT
Start: 2018-07-03 | End: 2018-07-06

## 2018-07-03 RX ADMIN — Medication 100 MILLIGRAM(S): at 21:07

## 2018-07-03 RX ADMIN — CEFEPIME 100 MILLIGRAM(S): 1 INJECTION, POWDER, FOR SOLUTION INTRAMUSCULAR; INTRAVENOUS at 05:38

## 2018-07-03 RX ADMIN — Medication 100 MILLIGRAM(S): at 05:38

## 2018-07-03 RX ADMIN — Medication 62.5 MILLIMOLE(S): at 17:45

## 2018-07-03 RX ADMIN — CEFEPIME 100 MILLIGRAM(S): 1 INJECTION, POWDER, FOR SOLUTION INTRAMUSCULAR; INTRAVENOUS at 21:07

## 2018-07-03 RX ADMIN — HYDROMORPHONE HYDROCHLORIDE 0.5 MILLIGRAM(S): 2 INJECTION INTRAMUSCULAR; INTRAVENOUS; SUBCUTANEOUS at 01:00

## 2018-07-03 RX ADMIN — HYDROMORPHONE HYDROCHLORIDE 0.5 MILLIGRAM(S): 2 INJECTION INTRAMUSCULAR; INTRAVENOUS; SUBCUTANEOUS at 00:23

## 2018-07-03 RX ADMIN — PANTOPRAZOLE SODIUM 40 MILLIGRAM(S): 20 TABLET, DELAYED RELEASE ORAL at 12:27

## 2018-07-03 RX ADMIN — ENOXAPARIN SODIUM 40 MILLIGRAM(S): 100 INJECTION SUBCUTANEOUS at 12:27

## 2018-07-03 RX ADMIN — Medication 100 MILLIGRAM(S): at 14:02

## 2018-07-03 RX ADMIN — CEFEPIME 100 MILLIGRAM(S): 1 INJECTION, POWDER, FOR SOLUTION INTRAMUSCULAR; INTRAVENOUS at 14:01

## 2018-07-03 NOTE — PROGRESS NOTE ADULT - ASSESSMENT
53 y/o man with HTN was diagnosed with sigmoid diverticulitis, with collection anterior to the rectum (measuring 6.2 x 4.5 cm) and right hemipelvis (measuring 3.4 x 2.3 cm),  s/p exploratory laparotomy with bowel resection and Re procedure 6/30/18.  Blood cultures neg and abscess drainage with Enterococcus sensitive to Amp.     1-Diverticulitis s/o bowel resection:  -All blood cultures neg  -Final wound culture from surgery neg  -First culture from drainage Enterococcus  -Stop cefepime and metronidazole  -Start Unasyn 3gm q6h while in the hospital, to cover enterococcus and also GI GNR and anaerobes.   -The length of treatment depends to his response and repeat CT next week. 51 y/o man with HTN was diagnosed with sigmoid diverticulitis, with collection anterior to the rectum (measuring 6.2 x 4.5 cm) and right hemipelvis (measuring 3.4 x 2.3 cm),  s/p exploratory laparotomy with bowel resection and Re procedure 6/30/18.  Blood cultures neg and abscess drainage with Enterococcus sensitive to Amp.     1-Diverticulitis s/o bowel resection:  -All blood cultures neg  -Final wound culture from surgery neg  -First culture from drainage Enterococcus  -Stop cefepime and metronidazole  -Start Unasyn 3gm q6h while in the hospital, to cover enterococcus and also GI GNR and anaerobes.   -The length of treatment depends to his response and repeat CT next week.     2-Left arm swelling:  -Possibly related to infiltrated IV line but needs to rule out DVT as well

## 2018-07-03 NOTE — PROGRESS NOTE ADULT - SUBJECTIVE AND OBJECTIVE BOX
HPI/OVERNIGHT EVENTS:  No acute overnight events. Patient NGT output 275/24 hrs. No nausea, vomiting. Tolerating OOB. Good ostomy output.    MEDICATIONS  (STANDING):  cefepime   IVPB 2000 milliGRAM(s) IV Intermittent every 8 hours  cefepime   IVPB      enoxaparin Injectable 40 milliGRAM(s) SubCutaneous daily  lactated ringers. 1000 milliLiter(s) (75 mL/Hr) IV Continuous <Continuous>  metroNIDAZOLE  IVPB 500 milliGRAM(s) IV Intermittent every 8 hours  metroNIDAZOLE  IVPB        MEDICATIONS  (PRN):  acetaminophen   Tablet. 650 milliGRAM(s) Oral every 6 hours PRN Mild Pain (1 - 3)  ondansetron Injectable 4 milliGRAM(s) IV Push every 4 hours PRN Nausea and/or Vomiting  oxyCODONE    IR 5 milliGRAM(s) Oral every 4 hours PRN Moderate Pain (4 - 6)      Vital Signs Last 24 Hrs  T(C): 36.9 (03 Jul 2018 07:56), Max: 37.4 (02 Jul 2018 16:02)  T(F): 98.5 (03 Jul 2018 07:56), Max: 99.4 (02 Jul 2018 16:02)  HR: 90 (03 Jul 2018 07:56) (87 - 98)  BP: 148/95 (03 Jul 2018 07:56) (114/82 - 148/95)  BP(mean): --  RR: 18 (03 Jul 2018 07:56) (18 - 19)  SpO2: 94% (03 Jul 2018 07:56) (92% - 98%)    Constitutional: patient resting comfortably in bed, in no acute distress  HEENT: EOMI / PERRL b/l, no active drainage or redness  Neck: No JVD, full ROM without pain  Respiratory: respirations are unlabored, no accessory muscle use, no conversational dyspnea  Cardiovascular: regular rate & rhythm  Gastrointestinal: incision open and packed, no signs of infection or erythema. Drain present, output 30cc/24 hr. Ostomy output 125/24hr  Neurological: GCS: 15 (4/5/6). A&O x 3; no gross sensory / motor / coordination deficits  Psychiatric: Normal mood, normal affect  Musculoskeletal: Moderate swelling in left arm. Good pulses      I&O's Detail    02 Jul 2018 07:01  -  03 Jul 2018 07:00  --------------------------------------------------------  IN:    lactated ringers.: 625 mL  Total IN: 625 mL    OUT:    Colostomy: 125 mL    Drain: 30 mL    Indwelling Catheter - Urethral: 475 mL    Nasoenteral Tube: 275 mL    Voided: 600 mL  Total OUT: 1505 mL    Total NET: -880 mL          LABS:                        9.9    12.0  )-----------( 672      ( 03 Jul 2018 06:33 )             30.3     07-03    138  |  97<L>  |  15.0  ----------------------------<  99  4.1   |  24.0  |  0.90    Ca    7.4<L>      03 Jul 2018 06:33  Phos  2.1     07-03  Mg     2.1     07-03 HPI/OVERNIGHT EVENTS:  No acute overnight events. Patient NGT output 275/24 hrs. No nausea, vomiting. Tolerating OOB. Good ostomy output. Complaining of L forearm swelling. States he noticed it last week but it has gotten slightly bigger. Ice makes it better. Left forearm is where patient is getting blood drawn from.    MEDICATIONS  (STANDING):  cefepime   IVPB 2000 milliGRAM(s) IV Intermittent every 8 hours  cefepime   IVPB      enoxaparin Injectable 40 milliGRAM(s) SubCutaneous daily  lactated ringers. 1000 milliLiter(s) (75 mL/Hr) IV Continuous <Continuous>  metroNIDAZOLE  IVPB 500 milliGRAM(s) IV Intermittent every 8 hours  metroNIDAZOLE  IVPB        MEDICATIONS  (PRN):  acetaminophen   Tablet. 650 milliGRAM(s) Oral every 6 hours PRN Mild Pain (1 - 3)  ondansetron Injectable 4 milliGRAM(s) IV Push every 4 hours PRN Nausea and/or Vomiting  oxyCODONE    IR 5 milliGRAM(s) Oral every 4 hours PRN Moderate Pain (4 - 6)      Vital Signs Last 24 Hrs  T(C): 36.9 (03 Jul 2018 07:56), Max: 37.4 (02 Jul 2018 16:02)  T(F): 98.5 (03 Jul 2018 07:56), Max: 99.4 (02 Jul 2018 16:02)  HR: 90 (03 Jul 2018 07:56) (87 - 98)  BP: 148/95 (03 Jul 2018 07:56) (114/82 - 148/95)  BP(mean): --  RR: 18 (03 Jul 2018 07:56) (18 - 19)  SpO2: 94% (03 Jul 2018 07:56) (92% - 98%)    Constitutional: patient resting comfortably in bed, in no acute distress  HEENT: EOMI / PERRL b/l, no active drainage or redness  Neck: No JVD, full ROM without pain  Respiratory: respirations are unlabored, no accessory muscle use, no conversational dyspnea  Cardiovascular: regular rate & rhythm  Gastrointestinal: incision open and packed, no signs of infection or erythema. Drain present, output 30cc/24 hr. Ostomy output 125/24hr  Neurological: GCS: 15 (4/5/6). A&O x 3; no gross sensory / motor / coordination deficits  Psychiatric: Normal mood, normal affect  Musculoskeletal: Moderate swelling in left arm. Good pulses, cap refill <2 seconds, warm.      I&O's Detail    02 Jul 2018 07:01  -  03 Jul 2018 07:00  --------------------------------------------------------  IN:    lactated ringers.: 625 mL  Total IN: 625 mL    OUT:    Colostomy: 125 mL    Drain: 30 mL    Indwelling Catheter - Urethral: 475 mL    Nasoenteral Tube: 275 mL    Voided: 600 mL  Total OUT: 1505 mL    Total NET: -880 mL          LABS:                        9.9    12.0  )-----------( 672      ( 03 Jul 2018 06:33 )             30.3     07-03    138  |  97<L>  |  15.0  ----------------------------<  99  4.1   |  24.0  |  0.90    Ca    7.4<L>      03 Jul 2018 06:33  Phos  2.1     07-03  Mg     2.1     07-03

## 2018-07-03 NOTE — PROGRESS NOTE ADULT - PROBLEM SELECTOR PROBLEM 2
Sepsis, due to unspecified organism
Sepsis due to other etiology
Essential hypertension
Sepsis due to other etiology
Sepsis due to other etiology
Sepsis, due to unspecified organism

## 2018-07-03 NOTE — PROGRESS NOTE ADULT - ATTENDING COMMENTS
anticipate 14 days of IV antibiotics at time of discharge.   - will need Mid line.
Improved pain and PO tolerance from yesterday.      Softly distended with mild suprapubic tenderness.      Reg diet.  Cont abx and drain.  Plan to re CT tomorrow to eval abscess regression or growth.
appetite remains poor.  Pain slightly worse, lower abd.  Increasing nausea.      Abd softly distended.  Suprapubic and RLQ tenderness.     Likely developing ileus.  Cont liquid diet as tolerated.  If worsening pain or distention will consider imaging, NG tube.  Cont abx.
Will Follow
Will Follow
transfer pt to surgery service and they will manage , asked no longer to follow while under their service   discussed with the surgery team earlier
Has improved.  Has been out of bed by himself.  States abdominal pain improved.  No stoma function as yet.  Low NGT output.  UO 1300/24hrs.  WBC down to 18K.  Abdomen: S/ND, incisional TTP, stoma pink and viable but not yet functional.  Clamp NGT.  D/c almodovar - void check.  Await stoma function.  Ambulate.
Patient seen and examined.   no  new issues, colostomy with liquid and gas, epigastrium no tympanic.  NGT output minimal, ileum resolves  remove NGT, will discuss with operative surgeon removal of drain  start liquids and ADAT  total of 4 days of cefepime flagyl

## 2018-07-03 NOTE — PROGRESS NOTE ADULT - ASSESSMENT
53yo male s/p ex-lap and Re's POD #3.   - Wound cx, blood cx neg.   - will continue cefapime and flagyl until7/6.  - D/c NGT today.   - Regular Diet + Ensure, as tolerated  - Wound Vac to be applied today on midline incision  - Pain control  - Monitor ostomy output  - Ostomy care  - DVT ppx - lovenox  - IS use, OOB, ambulate 53yo male s/p ex-lap and Re's POD #3.   - Wound cx, blood cx neg.   - will continue cefapime and flagyl until7/6.  - US of UE to r/o DVT  - D/c NGT today.   - Regular Diet + Ensure, as tolerated  - Wound Vac to be applied today on midline incision  - Pain control  - Monitor ostomy output  - Ostomy care  - DVT ppx - lovenox  - IS use, OOB, ambulate

## 2018-07-03 NOTE — PROGRESS NOTE ADULT - SUBJECTIVE AND OBJECTIVE BOX
[facility]  INFECTIOUS DISEASES AND INTERNAL MEDICINE at [place]  =======================================================  Andres Lino MD  Diplomates American Board of Internal Medicine and Infectious Diseases  =======================================================    TRACEYKAYLEE HALE [id number]    Follow up:  53 y/o man with HTN was diagnosed with sigmoid diverticulitis, with collection anterior to the rectum (measuring 6.2 x 4.5 cm) and right hemipelvis (measuring 3.4 x 2.3 cm),  s/p exploratory laparotomy with bowel resection and Re procedure 6/30/18. Doing well, no fever, leukocytosis is responding.  Still has the NGT.      Allergies:  No Known Allergies    Antibiotics:  cefepime   IVPB 2000 milliGRAM(s) IV Intermittent every 8 hours   metroNIDAZOLE  IVPB 500 milliGRAM(s) IV Intermittent every 8 hours    REVIEW OF SYSTEMS:  CONSTITUTIONAL:  No Fever or chills  HEENT:   No diplopia or blurred vision.  NGT is bothering him.   CARDIOVASCULAR:  No chest pain or SOB  RESPIRATORY:  No cough, shortness of breath, PND or orthopnea.  GASTROINTESTINAL:  abdominal pain   GENITOURINARY:  No dysuria, frequency or urgency. No Blood in urine  MUSCULOSKELETAL:  no joint aches, no muscle pain  SKIN:  No change in skin, hair or nails.  NEUROLOGIC:  No paresthesias, fasciculations, seizures or weakness.  PSYCHIATRIC:  No disorder of thought or mood.  ENDOCRINE:  No heat or cold intolerance, polyuria or polydipsia.  HEMATOLOGICAL:  No easy bruising or bleeding.      Physical Exam:  ICU Vital Signs Last 24 Hrs  T(C): 37.1 (03 Jul 2018 05:25), Max: 37.4 (02 Jul 2018 16:02)  T(F): 98.7 (03 Jul 2018 05:25), Max: 99.4 (02 Jul 2018 16:02)  HR: 87 (03 Jul 2018 05:25) (87 - 98)  BP: 128/77 (03 Jul 2018 05:25) (114/82 - 134/90)  RR: 19 (03 Jul 2018 05:25) (18 - 19)  SpO2: 92% (03 Jul 2018 05:25) (92% - 98%)  GEN: NAD  HEENT: normocephalic and atraumatic. EOMI. CANELO.  NG tube in place   NECK: Supple.  No lymphadenopathy   LUNGS: Clear to auscultation.  HEART: Regular rate and rhythm without murmur.  ABDOMEN: Soft, mild tenderness around the surgical area, has colostomy bag in left side with green fluid  Wound is open and packed, clean   : No CVA tenderness  EXTREMITIES: left arm swollen, good puls and warm    MSK: no joint swelling  NEUROLOGIC: grossly intact.  PSYCHIATRIC: Appropriate affect .  SKIN: as above    Labs:  07-03    138  |  97<L>  |  15.0  ----------------------------<  99  4.1   |  24.0  |  0.90    Ca    7.4<L>      03 Jul 2018 06:33  Phos  2.1     07-03  Mg     2.1     07-03                        9.9    12.0  )-----------( 672      ( 03 Jul 2018 06:33 )             30.3       RECENT CULTURES:  07-01 @ 10:22 .Surgical Swab peritoneal fluid #2     No growth at 2 days.  Culture in progress    Few White blood cells  No organisms seen    07-01 @ 10:21 .Surgical Swab peritoneal fluid #1     Culture in progress    Few White blood cells  No organisms seen    06-30 @ 16:57 .Blood Blood     No growth at 48 hours    06-29 @ 22:24 .Blood Blood     No growth at 48 hours    06-25 @ 17:40 .Abscess pelvis Enterococcus avium  Eubacterium limosum    Enterococcus avium Growing in broth media only  Few Eubacterium limosum    No White blood cells  No organisms seen  06-25 @ 00:53 .Blood Blood-Peripheral     No growth at 5 days.    06-23 @ 04:12 .Urine Clean Catch (Midstream)     <10,000 CFU/ml Gram Positive Cocci in Pairs and Chains    06-23 @ 01:06 .Blood Blood-Peripheral     No growth at 5 days.    06-23 @ 01:05 .Blood Blood-Peripheral     No growth at 5 days.    All imaging and data are reviewed.

## 2018-07-04 LAB
ANION GAP SERPL CALC-SCNC: 12 MMOL/L — SIGNIFICANT CHANGE UP (ref 5–17)
BASOPHILS # BLD AUTO: 0 K/UL — SIGNIFICANT CHANGE UP (ref 0–0.2)
BASOPHILS NFR BLD AUTO: 0.2 % — SIGNIFICANT CHANGE UP (ref 0–2)
BUN SERPL-MCNC: 17 MG/DL — SIGNIFICANT CHANGE UP (ref 8–20)
CALCIUM SERPL-MCNC: 7.3 MG/DL — LOW (ref 8.6–10.2)
CHLORIDE SERPL-SCNC: 100 MMOL/L — SIGNIFICANT CHANGE UP (ref 98–107)
CO2 SERPL-SCNC: 27 MMOL/L — SIGNIFICANT CHANGE UP (ref 22–29)
CREAT SERPL-MCNC: 0.98 MG/DL — SIGNIFICANT CHANGE UP (ref 0.5–1.3)
CULTURE RESULTS: SIGNIFICANT CHANGE UP
CULTURE RESULTS: SIGNIFICANT CHANGE UP
EOSINOPHIL # BLD AUTO: 0.4 K/UL — SIGNIFICANT CHANGE UP (ref 0–0.5)
EOSINOPHIL NFR BLD AUTO: 4.2 % — SIGNIFICANT CHANGE UP (ref 0–5)
GLUCOSE SERPL-MCNC: 112 MG/DL — SIGNIFICANT CHANGE UP (ref 70–115)
HCT VFR BLD CALC: 28.4 % — LOW (ref 42–52)
HGB BLD-MCNC: 9.3 G/DL — LOW (ref 14–18)
LYMPHOCYTES # BLD AUTO: 1 K/UL — SIGNIFICANT CHANGE UP (ref 1–4.8)
LYMPHOCYTES # BLD AUTO: 10 % — LOW (ref 20–55)
MAGNESIUM SERPL-MCNC: 2 MG/DL — SIGNIFICANT CHANGE UP (ref 1.6–2.6)
MCHC RBC-ENTMCNC: 28.6 PG — SIGNIFICANT CHANGE UP (ref 27–31)
MCHC RBC-ENTMCNC: 32.7 G/DL — SIGNIFICANT CHANGE UP (ref 32–36)
MCV RBC AUTO: 87.4 FL — SIGNIFICANT CHANGE UP (ref 80–94)
MONOCYTES # BLD AUTO: 0.9 K/UL — HIGH (ref 0–0.8)
MONOCYTES NFR BLD AUTO: 9.6 % — SIGNIFICANT CHANGE UP (ref 3–10)
NEUTROPHILS # BLD AUTO: 7.2 K/UL — SIGNIFICANT CHANGE UP (ref 1.8–8)
NEUTROPHILS NFR BLD AUTO: 73.8 % — HIGH (ref 37–73)
PHOSPHATE SERPL-MCNC: 2.2 MG/DL — LOW (ref 2.4–4.7)
PLATELET # BLD AUTO: 674 K/UL — HIGH (ref 150–400)
POTASSIUM SERPL-MCNC: 3.8 MMOL/L — SIGNIFICANT CHANGE UP (ref 3.5–5.3)
POTASSIUM SERPL-SCNC: 3.8 MMOL/L — SIGNIFICANT CHANGE UP (ref 3.5–5.3)
RBC # BLD: 3.25 M/UL — LOW (ref 4.6–6.2)
RBC # FLD: 13.6 % — SIGNIFICANT CHANGE UP (ref 11–15.6)
SODIUM SERPL-SCNC: 139 MMOL/L — SIGNIFICANT CHANGE UP (ref 135–145)
SPECIMEN SOURCE: SIGNIFICANT CHANGE UP
SPECIMEN SOURCE: SIGNIFICANT CHANGE UP
WBC # BLD: 9.8 K/UL — SIGNIFICANT CHANGE UP (ref 4.8–10.8)
WBC # FLD AUTO: 9.8 K/UL — SIGNIFICANT CHANGE UP (ref 4.8–10.8)

## 2018-07-04 PROCEDURE — 99232 SBSQ HOSP IP/OBS MODERATE 35: CPT

## 2018-07-04 RX ORDER — AMPICILLIN SODIUM AND SULBACTAM SODIUM 250; 125 MG/ML; MG/ML
3 INJECTION, POWDER, FOR SUSPENSION INTRAMUSCULAR; INTRAVENOUS ONCE
Qty: 0 | Refills: 0 | Status: COMPLETED | OUTPATIENT
Start: 2018-07-04 | End: 2018-07-04

## 2018-07-04 RX ORDER — AMPICILLIN SODIUM AND SULBACTAM SODIUM 250; 125 MG/ML; MG/ML
INJECTION, POWDER, FOR SUSPENSION INTRAMUSCULAR; INTRAVENOUS
Qty: 0 | Refills: 0 | Status: DISCONTINUED | OUTPATIENT
Start: 2018-07-04 | End: 2018-07-05

## 2018-07-04 RX ORDER — SODIUM,POTASSIUM PHOSPHATES 278-250MG
1 POWDER IN PACKET (EA) ORAL ONCE
Qty: 0 | Refills: 0 | Status: COMPLETED | OUTPATIENT
Start: 2018-07-04 | End: 2018-07-04

## 2018-07-04 RX ORDER — AMPICILLIN SODIUM AND SULBACTAM SODIUM 250; 125 MG/ML; MG/ML
3 INJECTION, POWDER, FOR SUSPENSION INTRAMUSCULAR; INTRAVENOUS EVERY 6 HOURS
Qty: 0 | Refills: 0 | Status: DISCONTINUED | OUTPATIENT
Start: 2018-07-04 | End: 2018-07-05

## 2018-07-04 RX ADMIN — ENOXAPARIN SODIUM 40 MILLIGRAM(S): 100 INJECTION SUBCUTANEOUS at 12:05

## 2018-07-04 RX ADMIN — AMPICILLIN SODIUM AND SULBACTAM SODIUM 200 GRAM(S): 250; 125 INJECTION, POWDER, FOR SUSPENSION INTRAMUSCULAR; INTRAVENOUS at 17:34

## 2018-07-04 RX ADMIN — Medication 1 PACKET(S): at 12:05

## 2018-07-04 RX ADMIN — Medication 100 MILLIGRAM(S): at 05:19

## 2018-07-04 RX ADMIN — AMPICILLIN SODIUM AND SULBACTAM SODIUM 200 GRAM(S): 250; 125 INJECTION, POWDER, FOR SUSPENSION INTRAMUSCULAR; INTRAVENOUS at 12:05

## 2018-07-04 RX ADMIN — CEFEPIME 100 MILLIGRAM(S): 1 INJECTION, POWDER, FOR SOLUTION INTRAMUSCULAR; INTRAVENOUS at 05:18

## 2018-07-04 NOTE — PROGRESS NOTE ADULT - SUBJECTIVE AND OBJECTIVE BOX
HPI/OVERNIGHT EVENTS:  No acute events overnight. Patient's pain well controlled. Tolerating regular diet s/p NGT removal yesterday. Ostomy w/ 825 cc output yesterday. Patient denies chest pain, sob, nausea, or vomiting.     MEDICATIONS  (STANDING):  cefepime   IVPB 2000 milliGRAM(s) IV Intermittent every 8 hours  cefepime   IVPB      enoxaparin Injectable 40 milliGRAM(s) SubCutaneous daily  lactated ringers. 1000 milliLiter(s) (75 mL/Hr) IV Continuous <Continuous>  metroNIDAZOLE  IVPB 500 milliGRAM(s) IV Intermittent every 8 hours  metroNIDAZOLE  IVPB        MEDICATIONS  (PRN):  acetaminophen   Tablet. 650 milliGRAM(s) Oral every 6 hours PRN Mild Pain (1 - 3)  ondansetron Injectable 4 milliGRAM(s) IV Push every 4 hours PRN Nausea and/or Vomiting  oxyCODONE    IR 5 milliGRAM(s) Oral every 4 hours PRN Moderate Pain (4 - 6)      Vital Signs Last 24 Hrs  T(C): 36.9 (04 Jul 2018 08:10), Max: 37.3 (03 Jul 2018 16:01)  T(F): 98.5 (04 Jul 2018 08:10), Max: 99.2 (03 Jul 2018 16:01)  HR: 70 (04 Jul 2018 08:10) (70 - 97)  BP: 129/87 (04 Jul 2018 08:10) (119/74 - 129/87)  BP(mean): --  RR: 18 (04 Jul 2018 08:10) (18 - 19)  SpO2: 96% (04 Jul 2018 08:10) (92% - 96%)    Gen: NAD, resting comfortably  Respiratory: respirations are unlabored, no accessory muscle use, no conversational dyspnea  Cardiovascular: RRR  Gastrointestinal: wound vac in place, holding suction -125 mmHg. Drain to bulb suction w/  20cc output over last 24 hr. Ostomy output 825/24hr, stoma pink, protuberant, and productive.   Musculoskeletal: Moderate swelling in left arm. Good pulses, cap refill <2 seconds, warm.      I&O's Detail    03 Jul 2018 07:01  -  04 Jul 2018 07:00  --------------------------------------------------------  IN:    lactated ringers.: 1000 mL    Oral Fluid: 250 mL  Total IN: 1250 mL    OUT:    Colostomy: 825 mL    Drain: 20 mL    Voided: 580 mL  Total OUT: 1425 mL    Total NET: -175 mL      04 Jul 2018 07:01  -  04 Jul 2018 10:07  --------------------------------------------------------  IN:    lactated ringers.: 225 mL    Oral Fluid: 150 mL  Total IN: 375 mL    OUT:    Colostomy: 250 mL  Total OUT: 250 mL    Total NET: 125 mL    LABS:                        9.3    9.8   )-----------( 674      ( 04 Jul 2018 05:29 )             28.4     07-04    139  |  100  |  17.0  ----------------------------<  112  3.8   |  27.0  |  0.98    Ca    7.3<L>      04 Jul 2018 05:29  Phos  2.2     07-04  Mg     2.0     07-04

## 2018-07-04 NOTE — PROGRESS NOTE ADULT - ASSESSMENT
53yo male POD#4 s/p ex-lap and Re's for Hinchey 2 diverticulitis.    - Will discuss abx reccomendations w/ Infectious Disease.   - Wound Vac to be changed next on 7/6  - Pain control  - Monitor ostomy output  - Discuss wound vac approval for home w/ SW  - Ostomy teaching  - IS use, OOB, ambulate  - Diet: regular  - DVT ppx - lovenox  - Dispo: possible d/c tmrw

## 2018-07-04 NOTE — PROGRESS NOTE ADULT - ASSESSMENT
53 y/o man with HTN was diagnosed with sigmoid diverticulitis, with collection anterior to the rectum (measuring 6.2 x 4.5 cm) and right hemipelvis (measuring 3.4 x 2.3 cm),  s/p exploratory laparotomy with bowel resection and Re procedure 6/30/18.  Blood cultures neg and abscess drainage with Enterococcus sensitive to Amp.     1-Diverticulitis s/o bowel resection:  -All blood cultures neg  -Final wound culture from surgery neg  -First culture from drainage Enterococcus ss to amp  -Stop cefepime and metronidazole  -Start Unasyn 3gm q6h while in the hospital, to cover enterococcus and also GI GNR and anaerobes.   -When ready for discharge from surigical point of view, can switched to po Augmentin 875 mg q12h for 2 weeks   -follow up abdominal CT in about one week, to ensure about resolution of collection prior to d/c'ing antibiotics.

## 2018-07-04 NOTE — CHART NOTE - NSCHARTNOTEFT_GEN_A_CORE
Nutrition Note: Pt was provided with colostomy diet education with good comprehension and hopeful compliance. Pt taking Regular diet well with Ensure supplements.

## 2018-07-04 NOTE — PROGRESS NOTE ADULT - SUBJECTIVE AND OBJECTIVE BOX
[facility]  INFECTIOUS DISEASES AND INTERNAL MEDICINE at [place]  =======================================================  Andres Lino MD  Diplomates American Board of Internal Medicine and Infectious Diseases  =======================================================    TRACEYKAYLEE HALE [id number]    Follow up:    51 y/o man with HTN was diagnosed with sigmoid diverticulitis, with collection anterior to the rectum (measuring 6.2 x 4.5 cm) and right hemipelvis (measuring 3.4 x 2.3 cm),  s/p exploratory laparotomy with bowel resection and Re procedure 6/30/18. Doing well, no fever, leukocytosis is responding.  NGT was removed now advanced to regular diet, tolerating well.     Allergies:  No Known Allergies    Antibiotics:  cefepime   IVPB 2000 milliGRAM(s) IV Intermittent every 8 hours   metroNIDAZOLE  IVPB 500 milliGRAM(s) IV Intermittent every 8 hours    REVIEW OF SYSTEMS:  CONSTITUTIONAL:  No Fever or chills  HEENT:   No diplopia or blurred vision.  NGT is bothering him.   CARDIOVASCULAR:  No chest pain or SOB  RESPIRATORY:  No cough, shortness of breath, PND or orthopnea.  GASTROINTESTINAL:  abdominal pain   GENITOURINARY:  No dysuria, frequency or urgency. No Blood in urine  MUSCULOSKELETAL:  no joint aches, no muscle pain  SKIN:  No change in skin, hair or nails.  NEUROLOGIC:  No paresthesias, fasciculations, seizures or weakness.  PSYCHIATRIC:  No disorder of thought or mood.  ENDOCRINE:  No heat or cold intolerance, polyuria or polydipsia.  HEMATOLOGICAL:  No easy bruising or bleeding.      Physical Exam:  Vital Signs Last 24 Hrs  T(C): 36.9 (04 Jul 2018 08:10), Max: 37.3 (03 Jul 2018 16:01)  T(F): 98.5 (04 Jul 2018 08:10), Max: 99.2 (03 Jul 2018 16:01)  HR: 70 (04 Jul 2018 08:10) (70 - 97)  BP: 129/87 (04 Jul 2018 08:10) (119/74 - 129/87)  RR: 18 (04 Jul 2018 08:10) (18 - 19)  SpO2: 96% (04 Jul 2018 08:10) (92% - 96%)  GEN: NAD  HEENT: normocephalic and atraumatic. EOMI. CANELO.    NECK: Supple.  No lymphadenopathy   LUNGS: Clear to auscultation.  HEART: Regular rate and rhythm without murmur.  ABDOMEN: Soft, mild tenderness around the surgical area, has colostomy bag in left side with good output  Wound is open and packed, clean   : No CVA tenderness  EXTREMITIES: left arm swelling is better, good puls and warm    MSK: no joint swelling  NEUROLOGIC: grossly intact.  PSYCHIATRIC: Appropriate affect .  SKIN: as above      Labs:  07-04    139  |  100  |  17.0  ----------------------------<  112  3.8   |  27.0  |  0.98    Ca    7.3<L>      04 Jul 2018 05:29  Phos  2.2     07-04  Mg     2.0     07-04                      9.3    9.8   )-----------( 674      ( 04 Jul 2018 05:29 )             28.4     RECENT CULTURES:  07-01 @ 10:22 .Surgical Swab peritoneal fluid #2     No growth at 3 days.  Culture in progress    Few White blood cells  No organisms seen    07-01 @ 10:21 .Surgical Swab peritoneal fluid #1     Alpha hemolytic strep Growing in broth media only Identification and  susceptibility to follow.  Corynebacterium species Growing in broth media only  Culture in progress    Few White blood cells  No organisms seen    06-30 @ 16:57 .Blood Blood     No growth at 48 hours    06-29 @ 22:24 .Blood Blood     No growth at 48 hours    06-25 @ 17:40 .Abscess pelvis Enterococcus avium  Eubacterium limosum    Enterococcus avium Growing in broth media only  Few Eubacterium limosum    No White blood cells  No organisms seen    06-25 @ 00:53 .Blood Blood-Peripheral     No growth at 5 days.      06-23 @ 04:12 .Urine Clean Catch (Midstream)     <10,000 CFU/ml Gram Positive Cocci in Pairs and Chains    06-23 @ 01:06 .Blood Blood-Peripheral     No growth at 5 days.    06-23 @ 01:05 .Blood Blood-Peripheral     No growth at 5 days.    All imaging and data are reviewed.

## 2018-07-04 NOTE — PROGRESS NOTE ADULT - SUBJECTIVE AND OBJECTIVE BOX
Feels ok  afebrile  sotomy site functioning  wound vac in place   doing well  Cont abx   supportive care

## 2018-07-05 LAB
-  CEFTRIAXONE: SIGNIFICANT CHANGE UP
-  CLINDAMYCIN: SIGNIFICANT CHANGE UP
-  ERYTHROMYCIN: SIGNIFICANT CHANGE UP
-  PENICILLIN: SIGNIFICANT CHANGE UP
-  VANCOMYCIN: SIGNIFICANT CHANGE UP
ANION GAP SERPL CALC-SCNC: 11 MMOL/L — SIGNIFICANT CHANGE UP (ref 5–17)
ANION GAP SERPL CALC-SCNC: 9 MMOL/L — SIGNIFICANT CHANGE UP (ref 5–17)
BUN SERPL-MCNC: 14 MG/DL — SIGNIFICANT CHANGE UP (ref 8–20)
BUN SERPL-MCNC: 16 MG/DL — SIGNIFICANT CHANGE UP (ref 8–20)
CALCIUM SERPL-MCNC: 7.3 MG/DL — LOW (ref 8.6–10.2)
CALCIUM SERPL-MCNC: 7.5 MG/DL — LOW (ref 8.6–10.2)
CHLORIDE SERPL-SCNC: 100 MMOL/L — SIGNIFICANT CHANGE UP (ref 98–107)
CHLORIDE SERPL-SCNC: 97 MMOL/L — LOW (ref 98–107)
CO2 SERPL-SCNC: 27 MMOL/L — SIGNIFICANT CHANGE UP (ref 22–29)
CO2 SERPL-SCNC: 28 MMOL/L — SIGNIFICANT CHANGE UP (ref 22–29)
CREAT SERPL-MCNC: 0.79 MG/DL — SIGNIFICANT CHANGE UP (ref 0.5–1.3)
CREAT SERPL-MCNC: 0.81 MG/DL — SIGNIFICANT CHANGE UP (ref 0.5–1.3)
CULTURE RESULTS: SIGNIFICANT CHANGE UP
CULTURE RESULTS: SIGNIFICANT CHANGE UP
GLUCOSE SERPL-MCNC: 129 MG/DL — HIGH (ref 70–115)
GLUCOSE SERPL-MCNC: 139 MG/DL — HIGH (ref 70–115)
HCT VFR BLD CALC: 30.3 % — LOW (ref 42–52)
HGB BLD-MCNC: 10 G/DL — LOW (ref 14–18)
MAGNESIUM SERPL-MCNC: 1.9 MG/DL — SIGNIFICANT CHANGE UP (ref 1.6–2.6)
MCHC RBC-ENTMCNC: 29 PG — SIGNIFICANT CHANGE UP (ref 27–31)
MCHC RBC-ENTMCNC: 33 G/DL — SIGNIFICANT CHANGE UP (ref 32–36)
MCV RBC AUTO: 87.8 FL — SIGNIFICANT CHANGE UP (ref 80–94)
METHOD TYPE: SIGNIFICANT CHANGE UP
ORGANISM # SPEC MICROSCOPIC CNT: SIGNIFICANT CHANGE UP
ORGANISM # SPEC MICROSCOPIC CNT: SIGNIFICANT CHANGE UP
PHOSPHATE SERPL-MCNC: 2 MG/DL — LOW (ref 2.4–4.7)
PHOSPHATE SERPL-MCNC: 3.4 MG/DL — SIGNIFICANT CHANGE UP (ref 2.4–4.7)
PLATELET # BLD AUTO: 736 K/UL — HIGH (ref 150–400)
POTASSIUM SERPL-MCNC: 3.6 MMOL/L — SIGNIFICANT CHANGE UP (ref 3.5–5.3)
POTASSIUM SERPL-MCNC: 3.9 MMOL/L — SIGNIFICANT CHANGE UP (ref 3.5–5.3)
POTASSIUM SERPL-SCNC: 3.6 MMOL/L — SIGNIFICANT CHANGE UP (ref 3.5–5.3)
POTASSIUM SERPL-SCNC: 3.9 MMOL/L — SIGNIFICANT CHANGE UP (ref 3.5–5.3)
RBC # BLD: 3.45 M/UL — LOW (ref 4.6–6.2)
RBC # FLD: 13.3 % — SIGNIFICANT CHANGE UP (ref 11–15.6)
SODIUM SERPL-SCNC: 134 MMOL/L — LOW (ref 135–145)
SODIUM SERPL-SCNC: 138 MMOL/L — SIGNIFICANT CHANGE UP (ref 135–145)
SPECIMEN SOURCE: SIGNIFICANT CHANGE UP
SPECIMEN SOURCE: SIGNIFICANT CHANGE UP
WBC # BLD: 10.1 K/UL — SIGNIFICANT CHANGE UP (ref 4.8–10.8)
WBC # FLD AUTO: 10.1 K/UL — SIGNIFICANT CHANGE UP (ref 4.8–10.8)

## 2018-07-05 RX ORDER — LOSARTAN POTASSIUM 100 MG/1
25 TABLET, FILM COATED ORAL DAILY
Qty: 0 | Refills: 0 | Status: DISCONTINUED | OUTPATIENT
Start: 2018-07-05 | End: 2018-07-06

## 2018-07-05 RX ADMIN — AMPICILLIN SODIUM AND SULBACTAM SODIUM 200 GRAM(S): 250; 125 INJECTION, POWDER, FOR SUSPENSION INTRAMUSCULAR; INTRAVENOUS at 00:10

## 2018-07-05 RX ADMIN — ENOXAPARIN SODIUM 40 MILLIGRAM(S): 100 INJECTION SUBCUTANEOUS at 13:25

## 2018-07-05 RX ADMIN — SODIUM CHLORIDE 75 MILLILITER(S): 9 INJECTION, SOLUTION INTRAVENOUS at 05:49

## 2018-07-05 RX ADMIN — LOSARTAN POTASSIUM 25 MILLIGRAM(S): 100 TABLET, FILM COATED ORAL at 13:26

## 2018-07-05 RX ADMIN — Medication 1 TABLET(S): at 17:38

## 2018-07-05 RX ADMIN — Medication 85 MILLIMOLE(S): at 01:43

## 2018-07-05 RX ADMIN — AMPICILLIN SODIUM AND SULBACTAM SODIUM 200 GRAM(S): 250; 125 INJECTION, POWDER, FOR SUSPENSION INTRAMUSCULAR; INTRAVENOUS at 08:41

## 2018-07-05 NOTE — CHART NOTE - NSCHARTNOTEFT_GEN_A_CORE
Patient has a surgically created midline wound for a hartmans procedure for a diagnosis of Hinchey 2 diverticulitis, a wound vac is needed for accelerated healing. The measurements 13cm x5cm x3cm.

## 2018-07-05 NOTE — PROGRESS NOTE ADULT - SUBJECTIVE AND OBJECTIVE BOX
INTERVAL HPI/OVERNIGHT EVENTS: no acute events overnight. antibiotics switched to unasyn yesterday. advanced to regular diet and tolerated. continuing local wound care for ostomy. wound vac still intact with good seal to midline wound. denies fevers, chills, nausea, vomiting, chest pain, shortness of breath.     STATUS POST:  ex lap, Re's    POST OPERATIVE DAY #: 5      MEDICATIONS  (STANDING):  ampicillin/sulbactam  IVPB 3 Gram(s) IV Intermittent every 6 hours  ampicillin/sulbactam  IVPB      enoxaparin Injectable 40 milliGRAM(s) SubCutaneous daily  lactated ringers. 1000 milliLiter(s) (75 mL/Hr) IV Continuous <Continuous>    MEDICATIONS  (PRN):  acetaminophen   Tablet. 650 milliGRAM(s) Oral every 6 hours PRN Mild Pain (1 - 3)  ondansetron Injectable 4 milliGRAM(s) IV Push every 4 hours PRN Nausea and/or Vomiting  oxyCODONE    IR 5 milliGRAM(s) Oral every 4 hours PRN Moderate Pain (4 - 6)      Vital Signs Last 24 Hrs  T(C): 36.9 (05 Jul 2018 08:21), Max: 37.1 (05 Jul 2018 00:25)  T(F): 98.5 (05 Jul 2018 08:21), Max: 98.8 (05 Jul 2018 00:25)  HR: 77 (05 Jul 2018 08:21) (75 - 80)  BP: 134/91 (05 Jul 2018 08:21) (127/79 - 145/92)  BP(mean): --  RR: 18 (05 Jul 2018 08:21) (18 - 19)  SpO2: 94% (05 Jul 2018 08:21) (94% - 97%)    Physical Exam:    Neurological:  No sensory/motor deficits    HEENT: PERRLA, EOMI, no drainage or redness    Neck: No bruits; no thyromegaly or nodules,  No JVD    Back: Normal spine flexure, No CVA tenderness, No deformity or limitation of movement    Respiratory: Breath Sounds equal & clear to auscultation, no accessory muscle use    Cardiovascular: Regular rate & rhythm, normal S1, S2; no murmurs, gallops or rubs    Gastrointestinal: Soft, non-tender, normal bowel sounds. wound vac in place midline. ostomy functioning with liquid output    Extremities: No peripheral edema, No cyanosis, clubbing     Vascular: Equal and normal pulses: 2+ peripheral pulses throughout    Musculoskeletal: No joint pain, swelling or deformity; no limitation of movement    Skin: No rashes      I&O's Detail    04 Jul 2018 07:01  -  05 Jul 2018 07:00  --------------------------------------------------------  IN:    lactated ringers.: 1125 mL    Oral Fluid: 300 mL    Solution: 800 mL  Total IN: 2225 mL    OUT:    Colostomy: 1225 mL    Drain: 5 mL    Voided: 825 mL  Total OUT: 2055 mL    Total NET: 170 mL      05 Jul 2018 07:01  -  05 Jul 2018 10:09  --------------------------------------------------------  IN:  Total IN: 0 mL    OUT:    Colostomy: 350 mL  Total OUT: 350 mL    Total NET: -350 mL          LABS:                        10.0   10.1  )-----------( 736      ( 05 Jul 2018 06:51 )             30.3     07-05    138  |  100  |  14.0  ----------------------------<  129<H>  3.9   |  27.0  |  0.79    Ca    7.5<L>      05 Jul 2018 06:51  Phos  3.4     07-05  Mg     1.9     07-05            RADIOLOGY & ADDITIONAL STUDIES:

## 2018-07-05 NOTE — PROGRESS NOTE ADULT - PROBLEM SELECTOR PROBLEM 1
Diverticulitis of large intestine with abscess without bleeding
Acute diverticulitis
Diverticulitis of large intestine with abscess without bleeding
Acute diverticulitis
Acute diverticulitis
Diverticulitis of large intestine with abscess without bleeding

## 2018-07-05 NOTE — PROGRESS NOTE ADULT - PROBLEM SELECTOR PLAN 1
-pain control PRn  -continue diet  -maintain wound vac to midline  -ostomy training for family  -per ID d/c with augmentin for 2 weeks   -monitor IR drain output  -f/u SW for home wound vac delivery for dispo planning

## 2018-07-05 NOTE — PROGRESS NOTE ADULT - PROVIDER SPECIALTY LIST ADULT
Anesthesia
Gastroenterology
Hospitalist
Infectious Disease
Surgery
Trauma Surgery
Trauma Surgery
Infectious Disease
Infectious Disease
Hospitalist

## 2018-07-05 NOTE — CHART NOTE - NSCHARTNOTESELECT_GEN_ALL_CORE
Event Note
Code sepsis/Rapid Response
Event Note
Nutrition Services
dressing change/Event Note
surgical wound/Event Note

## 2018-07-06 ENCOUNTER — TRANSCRIPTION ENCOUNTER (OUTPATIENT)
Age: 53
End: 2018-07-06

## 2018-07-06 VITALS
HEART RATE: 90 BPM | SYSTOLIC BLOOD PRESSURE: 137 MMHG | TEMPERATURE: 98 F | DIASTOLIC BLOOD PRESSURE: 91 MMHG | RESPIRATION RATE: 18 BRPM | OXYGEN SATURATION: 97 %

## 2018-07-06 LAB
CULTURE RESULTS: SIGNIFICANT CHANGE UP
CULTURE RESULTS: SIGNIFICANT CHANGE UP
SPECIMEN SOURCE: SIGNIFICANT CHANGE UP

## 2018-07-06 PROCEDURE — 87040 BLOOD CULTURE FOR BACTERIA: CPT

## 2018-07-06 PROCEDURE — 86900 BLOOD TYPING SEROLOGIC ABO: CPT

## 2018-07-06 PROCEDURE — 85610 PROTHROMBIN TIME: CPT

## 2018-07-06 PROCEDURE — 87075 CULTR BACTERIA EXCEPT BLOOD: CPT

## 2018-07-06 PROCEDURE — 85730 THROMBOPLASTIN TIME PARTIAL: CPT

## 2018-07-06 PROCEDURE — 96374 THER/PROPH/DIAG INJ IV PUSH: CPT | Mod: XU

## 2018-07-06 PROCEDURE — 84100 ASSAY OF PHOSPHORUS: CPT

## 2018-07-06 PROCEDURE — 80048 BASIC METABOLIC PNL TOTAL CA: CPT

## 2018-07-06 PROCEDURE — 93971 EXTREMITY STUDY: CPT

## 2018-07-06 PROCEDURE — 87491 CHLMYD TRACH DNA AMP PROBE: CPT

## 2018-07-06 PROCEDURE — 85014 HEMATOCRIT: CPT

## 2018-07-06 PROCEDURE — 93005 ELECTROCARDIOGRAM TRACING: CPT

## 2018-07-06 PROCEDURE — 82947 ASSAY GLUCOSE BLOOD QUANT: CPT

## 2018-07-06 PROCEDURE — C1769: CPT

## 2018-07-06 PROCEDURE — C1729: CPT

## 2018-07-06 PROCEDURE — 85027 COMPLETE CBC AUTOMATED: CPT

## 2018-07-06 PROCEDURE — 87070 CULTURE OTHR SPECIMN AEROBIC: CPT

## 2018-07-06 PROCEDURE — 88305 TISSUE EXAM BY PATHOLOGIST: CPT

## 2018-07-06 PROCEDURE — 82803 BLOOD GASES ANY COMBINATION: CPT

## 2018-07-06 PROCEDURE — 99285 EMERGENCY DEPT VISIT HI MDM: CPT | Mod: 25

## 2018-07-06 PROCEDURE — 89051 BODY FLUID CELL COUNT: CPT

## 2018-07-06 PROCEDURE — 84145 PROCALCITONIN (PCT): CPT

## 2018-07-06 PROCEDURE — 77012 CT SCAN FOR NEEDLE BIOPSY: CPT

## 2018-07-06 PROCEDURE — 86923 COMPATIBILITY TEST ELECTRIC: CPT

## 2018-07-06 PROCEDURE — 87186 SC STD MICRODIL/AGAR DIL: CPT

## 2018-07-06 PROCEDURE — 88307 TISSUE EXAM BY PATHOLOGIST: CPT

## 2018-07-06 PROCEDURE — 86901 BLOOD TYPING SEROLOGIC RH(D): CPT

## 2018-07-06 PROCEDURE — 86850 RBC ANTIBODY SCREEN: CPT

## 2018-07-06 PROCEDURE — 82962 GLUCOSE BLOOD TEST: CPT

## 2018-07-06 PROCEDURE — 87086 URINE CULTURE/COLONY COUNT: CPT

## 2018-07-06 PROCEDURE — 80202 ASSAY OF VANCOMYCIN: CPT

## 2018-07-06 PROCEDURE — 80053 COMPREHEN METABOLIC PANEL: CPT

## 2018-07-06 PROCEDURE — 71046 X-RAY EXAM CHEST 2 VIEWS: CPT

## 2018-07-06 PROCEDURE — 83605 ASSAY OF LACTIC ACID: CPT

## 2018-07-06 PROCEDURE — 36415 COLL VENOUS BLD VENIPUNCTURE: CPT

## 2018-07-06 PROCEDURE — 87591 N.GONORRHOEAE DNA AMP PROB: CPT

## 2018-07-06 PROCEDURE — 87205 SMEAR GRAM STAIN: CPT

## 2018-07-06 PROCEDURE — 74018 RADEX ABDOMEN 1 VIEW: CPT

## 2018-07-06 PROCEDURE — 74177 CT ABD & PELVIS W/CONTRAST: CPT

## 2018-07-06 PROCEDURE — 96375 TX/PRO/DX INJ NEW DRUG ADDON: CPT | Mod: XU

## 2018-07-06 PROCEDURE — 82330 ASSAY OF CALCIUM: CPT

## 2018-07-06 PROCEDURE — 84132 ASSAY OF SERUM POTASSIUM: CPT

## 2018-07-06 PROCEDURE — 71045 X-RAY EXAM CHEST 1 VIEW: CPT

## 2018-07-06 PROCEDURE — 81001 URINALYSIS AUTO W/SCOPE: CPT

## 2018-07-06 PROCEDURE — 82435 ASSAY OF BLOOD CHLORIDE: CPT

## 2018-07-06 PROCEDURE — 83735 ASSAY OF MAGNESIUM: CPT

## 2018-07-06 PROCEDURE — 84295 ASSAY OF SERUM SODIUM: CPT

## 2018-07-06 PROCEDURE — 84484 ASSAY OF TROPONIN QUANT: CPT

## 2018-07-06 RX ORDER — OXYCODONE HYDROCHLORIDE 5 MG/1
1 TABLET ORAL
Qty: 15 | Refills: 0 | OUTPATIENT
Start: 2018-07-06

## 2018-07-06 RX ORDER — LOSARTAN POTASSIUM 100 MG/1
1 TABLET, FILM COATED ORAL
Qty: 0 | Refills: 0 | COMMUNITY
Start: 2018-07-06

## 2018-07-06 RX ORDER — ACETAMINOPHEN 500 MG
2 TABLET ORAL
Qty: 0 | Refills: 0 | COMMUNITY
Start: 2018-07-06

## 2018-07-06 RX ORDER — LOSARTAN POTASSIUM 100 MG/1
1 TABLET, FILM COATED ORAL
Qty: 0 | Refills: 0 | COMMUNITY

## 2018-07-06 RX ADMIN — SODIUM CHLORIDE 75 MILLILITER(S): 9 INJECTION, SOLUTION INTRAVENOUS at 07:07

## 2018-07-06 RX ADMIN — LOSARTAN POTASSIUM 25 MILLIGRAM(S): 100 TABLET, FILM COATED ORAL at 06:27

## 2018-07-06 RX ADMIN — Medication 1 TABLET(S): at 06:27

## 2018-07-06 NOTE — DISCHARGE NOTE ADULT - PLAN OF CARE
to be pain free and return to daily activities of living Please follow up with the acute care surgery outpatient clinic next Thursday. The visiting nurse will come to your home and assist with wound vac changes to your midline surgical incision. The wound is [id number], it will be changed every 72 hours with a black sponge at settings of  whatever is the most tolerable for the patient. The nurse will also assist you with ostomy care and teaching. Prior to the vac change we suggest you shower and allow the soap and water to generously flow over wound. IF you should have any change in clinical status please return to the emergency room as soon as possible.

## 2018-07-06 NOTE — DISCHARGE NOTE ADULT - PATIENT PORTAL LINK FT
You can access the [facility] Patient Portal, offered by [facility], by registering with the following website: [email or web address]

## 2018-07-06 NOTE — DISCHARGE NOTE ADULT - CARE PLAN
Principal Discharge DX:	Acute diverticulitis  Goal:	to be pain free and return to daily activities of living  Assessment and plan of treatment:	Please follow up with the acute care surgery outpatient clinic next Thursday. The visiting nurse will come to your home and assist with wound vac changes to your midline surgical incision. The wound is [id number], it will be changed every 72 hours with a black sponge at settings of  whatever is the most tolerable for the patient. The nurse will also assist you with ostomy care and teaching. Prior to the vac change we suggest you shower and allow the soap and water to generously flow over wound. IF you should have any change in clinical status please return to the emergency room as soon as possible.

## 2018-07-06 NOTE — DISCHARGE NOTE ADULT - INSTRUCTIONS
advance diet as tolerated  Please refrain heavy lifting, contact sports, strenuous exercise until your follow up appointment at the acs clinic  While on narcotics do not drive, operate heavy machinery, make any important decisions, drink alcohol

## 2018-07-06 NOTE — DISCHARGE NOTE ADULT - NS AS ACTIVITY OBS
Walking-Outdoors allowed/Stairs allowed/Sex allowed/Showering allowed/No Heavy lifting/straining/Do not make important decisions/Do not drive or operate machinery/Walking-Indoors allowed

## 2018-07-06 NOTE — DISCHARGE NOTE ADULT - NSFTFSERV1RD_GEN_ALL_CORE
wound care and assessment/ostomy care and management/observation and assessment/teaching and training

## 2018-07-06 NOTE — DISCHARGE NOTE ADULT - HOSPITAL COURSE
his is a 53 yo M with HTN, cervical radiculopathy, admitted to the medical service on 7/23/18 for uncomplicated sigmoid diverticulitis. He was kept NPO, started on Cipro/Flagyl IV. Yesterday overnight, 6/25 ,  patient had worsening abdominal pain, code sepsis was called due to fever of 102F and tachycardia. Repeat CT was performed, showed new development of pericolonic small abscess and 4m x 5cm abscess in the pelvis anterior to the rectum. We then took patient over from medicine and asked IR to evaluate if the collections had an adequate window to be drained. On the morning of 6/25 Dr. Mena was able to take the patient to the IR suite for pre-op diagnosis Abscess of peritoneum , post-op diagnosis  Abdominopelvic abscess  procedure Computed tomography guidance for abscess drainage , Patient tolerated procedure well, ID and medicine following as he was admitted to medicine prior to presentation to [facility]. Patient then had a repeat CT scan on 6/30 that revealed Perforated diverticulitis with 12 cm abscess, free peritoneal air or fluid. Patient was then taken to the OR on 6/30 for pre-op diagnosis Diverticulitis of large intestine with perforation and abscess without bleeding, post-op diagnosis Diverticulitis of large intestine with perforation and abscess without bleeding, procedure Re colectomy /Exploratory laparotomy with bowel resection/ Patient tolerated procedure well, pain controlled, ostomy began to function, diet advanced, midline wound left open and fascia closed first with dressing changes wet to dry and then switched to wound vac which the patient will go home with.  Patient received ostomy teaching and will get vns for both aid in ostomy care and wound vac care. He will be discharged home today with his home wound vac and a script for pain medications. It was discussed with operating attending and the continuation of antibiotics is not necessary for the patient as the team feels he finished an appropriate course and blood cutlures remained negative. He will follow up with acs next thursday.

## 2018-07-06 NOTE — DISCHARGE NOTE ADULT - MEDICATION SUMMARY - MEDICATIONS TO TAKE
I will START or STAY ON the medications listed below when I get home from the hospital:    acetaminophen 325 mg oral tablet  -- 2 tab(s) by mouth every 6 hours, As needed, Mild Pain (1 - 3)  -- Indication: For Acute diverticulitis    oxyCODONE 5 mg oral tablet  -- 1 tab(s) by mouth every 4 hours, As needed, Moderate Pain (4 - 6) MDD:6 tablets  -- Indication: For Acute diverticulitis    losartan 25 mg oral tablet  -- 1 tab(s) by mouth once a day  -- Indication: For Essential hypertension

## 2018-07-06 NOTE — DISCHARGE NOTE ADULT - CARE PROVIDER_API CALL
Jesse Stallworth), Surgery; Surgical Critical Care  [address] [facility] [address] [facility]  [address] [place] [address]  Phone: [phone]  Fax: [phone]

## 2018-07-12 ENCOUNTER — APPOINTMENT (OUTPATIENT)
Dept: TRAUMA SURGERY | Facility: CLINIC | Age: 53
End: 2018-07-12
Payer: MEDICARE

## 2018-07-12 VITALS
TEMPERATURE: 98.7 F | WEIGHT: 200 LBS | SYSTOLIC BLOOD PRESSURE: 159 MMHG | DIASTOLIC BLOOD PRESSURE: 108 MMHG | OXYGEN SATURATION: 96 % | HEART RATE: 90 BPM

## 2018-07-12 LAB — SURGICAL PATHOLOGY FINAL REPORT - CH: SIGNIFICANT CHANGE UP

## 2018-07-12 PROCEDURE — 99024 POSTOP FOLLOW-UP VISIT: CPT

## 2018-07-19 ENCOUNTER — APPOINTMENT (OUTPATIENT)
Dept: TRAUMA SURGERY | Facility: CLINIC | Age: 53
End: 2018-07-19

## 2018-07-19 ENCOUNTER — APPOINTMENT (OUTPATIENT)
Dept: TRAUMA SURGERY | Facility: CLINIC | Age: 53
End: 2018-07-19
Payer: MEDICARE

## 2018-07-19 VITALS
OXYGEN SATURATION: 96 % | TEMPERATURE: 98.3 F | DIASTOLIC BLOOD PRESSURE: 87 MMHG | WEIGHT: 198 LBS | HEART RATE: 105 BPM | SYSTOLIC BLOOD PRESSURE: 121 MMHG

## 2018-07-19 PROBLEM — I10 ESSENTIAL (PRIMARY) HYPERTENSION: Chronic | Status: ACTIVE | Noted: 2018-06-23

## 2018-07-19 PROCEDURE — 99024 POSTOP FOLLOW-UP VISIT: CPT

## 2018-07-26 ENCOUNTER — APPOINTMENT (OUTPATIENT)
Dept: TRAUMA SURGERY | Facility: CLINIC | Age: 53
End: 2018-07-26
Payer: MEDICARE

## 2018-07-26 VITALS
SYSTOLIC BLOOD PRESSURE: 123 MMHG | WEIGHT: 200 LBS | OXYGEN SATURATION: 96 % | DIASTOLIC BLOOD PRESSURE: 88 MMHG | TEMPERATURE: 97.7 F | HEIGHT: 69 IN | BODY MASS INDEX: 29.62 KG/M2 | HEART RATE: 91 BPM

## 2018-07-26 PROCEDURE — 99024 POSTOP FOLLOW-UP VISIT: CPT

## 2018-08-02 ENCOUNTER — APPOINTMENT (OUTPATIENT)
Dept: TRAUMA SURGERY | Facility: CLINIC | Age: 53
End: 2018-08-02
Payer: MEDICARE

## 2018-08-02 VITALS
BODY MASS INDEX: 30.13 KG/M2 | HEART RATE: 90 BPM | DIASTOLIC BLOOD PRESSURE: 75 MMHG | OXYGEN SATURATION: 95 % | WEIGHT: 204 LBS | SYSTOLIC BLOOD PRESSURE: 111 MMHG | TEMPERATURE: 97.7 F

## 2018-08-02 PROCEDURE — 99024 POSTOP FOLLOW-UP VISIT: CPT

## 2018-08-16 ENCOUNTER — APPOINTMENT (OUTPATIENT)
Dept: TRAUMA SURGERY | Facility: CLINIC | Age: 53
End: 2018-08-16
Payer: MEDICARE

## 2018-08-16 VITALS
HEIGHT: 69 IN | SYSTOLIC BLOOD PRESSURE: 118 MMHG | OXYGEN SATURATION: 96 % | BODY MASS INDEX: 29.92 KG/M2 | HEART RATE: 71 BPM | TEMPERATURE: 98.3 F | DIASTOLIC BLOOD PRESSURE: 81 MMHG | WEIGHT: 202 LBS

## 2018-08-16 PROCEDURE — 99024 POSTOP FOLLOW-UP VISIT: CPT

## 2018-09-20 ENCOUNTER — APPOINTMENT (OUTPATIENT)
Dept: TRAUMA SURGERY | Facility: CLINIC | Age: 53
End: 2018-09-20
Payer: MEDICARE

## 2018-09-20 VITALS
DIASTOLIC BLOOD PRESSURE: 87 MMHG | SYSTOLIC BLOOD PRESSURE: 130 MMHG | HEIGHT: 69 IN | HEART RATE: 63 BPM | BODY MASS INDEX: 30.07 KG/M2 | TEMPERATURE: 97.9 F | WEIGHT: 203 LBS | OXYGEN SATURATION: 96 %

## 2018-09-20 PROCEDURE — 99024 POSTOP FOLLOW-UP VISIT: CPT

## 2018-10-12 ENCOUNTER — OUTPATIENT (OUTPATIENT)
Dept: OUTPATIENT SERVICES | Facility: HOSPITAL | Age: 53
LOS: 1 days | End: 2018-10-12
Payer: MEDICARE

## 2018-10-12 VITALS
DIASTOLIC BLOOD PRESSURE: 86 MMHG | SYSTOLIC BLOOD PRESSURE: 123 MMHG | TEMPERATURE: 97 F | RESPIRATION RATE: 16 BRPM | WEIGHT: 207.23 LBS | HEART RATE: 71 BPM | HEIGHT: 69 IN

## 2018-10-12 DIAGNOSIS — I10 ESSENTIAL (PRIMARY) HYPERTENSION: ICD-10-CM

## 2018-10-12 DIAGNOSIS — Z01.818 ENCOUNTER FOR OTHER PREPROCEDURAL EXAMINATION: ICD-10-CM

## 2018-10-12 DIAGNOSIS — Z98.890 OTHER SPECIFIED POSTPROCEDURAL STATES: Chronic | ICD-10-CM

## 2018-10-12 DIAGNOSIS — Z29.9 ENCOUNTER FOR PROPHYLACTIC MEASURES, UNSPECIFIED: ICD-10-CM

## 2018-10-12 DIAGNOSIS — Z93.3 COLOSTOMY STATUS: Chronic | ICD-10-CM

## 2018-10-12 DIAGNOSIS — Z93.3 COLOSTOMY STATUS: ICD-10-CM

## 2018-10-12 LAB
ALBUMIN SERPL ELPH-MCNC: 4.6 G/DL — SIGNIFICANT CHANGE UP (ref 3.3–5.2)
ALP SERPL-CCNC: 81 U/L — SIGNIFICANT CHANGE UP (ref 40–120)
ALT FLD-CCNC: 28 U/L — SIGNIFICANT CHANGE UP
ANION GAP SERPL CALC-SCNC: 13 MMOL/L — SIGNIFICANT CHANGE UP (ref 5–17)
APTT BLD: 29.3 SEC — SIGNIFICANT CHANGE UP (ref 27.5–37.4)
AST SERPL-CCNC: 25 U/L — SIGNIFICANT CHANGE UP
BASOPHILS # BLD AUTO: 0.1 K/UL — SIGNIFICANT CHANGE UP (ref 0–0.2)
BASOPHILS NFR BLD AUTO: 1.2 % — SIGNIFICANT CHANGE UP (ref 0–2)
BILIRUB SERPL-MCNC: 0.3 MG/DL — LOW (ref 0.4–2)
BLD GP AB SCN SERPL QL: SIGNIFICANT CHANGE UP
BUN SERPL-MCNC: 21 MG/DL — HIGH (ref 8–20)
CALCIUM SERPL-MCNC: 9.6 MG/DL — SIGNIFICANT CHANGE UP (ref 8.6–10.2)
CHLORIDE SERPL-SCNC: 101 MMOL/L — SIGNIFICANT CHANGE UP (ref 98–107)
CO2 SERPL-SCNC: 25 MMOL/L — SIGNIFICANT CHANGE UP (ref 22–29)
CREAT SERPL-MCNC: 1.16 MG/DL — SIGNIFICANT CHANGE UP (ref 0.5–1.3)
EOSINOPHIL # BLD AUTO: 0.2 K/UL — SIGNIFICANT CHANGE UP (ref 0–0.5)
EOSINOPHIL NFR BLD AUTO: 3.9 % — SIGNIFICANT CHANGE UP (ref 0–6)
GLUCOSE SERPL-MCNC: 108 MG/DL — SIGNIFICANT CHANGE UP (ref 70–115)
HBA1C BLD-MCNC: 5.7 % — HIGH (ref 4–5.6)
HCT VFR BLD CALC: 45.8 % — SIGNIFICANT CHANGE UP (ref 42–52)
HGB BLD-MCNC: 15.2 G/DL — SIGNIFICANT CHANGE UP (ref 14–18)
INR BLD: 0.92 RATIO — SIGNIFICANT CHANGE UP (ref 0.88–1.16)
LYMPHOCYTES # BLD AUTO: 1.1 K/UL — SIGNIFICANT CHANGE UP (ref 1–4.8)
LYMPHOCYTES # BLD AUTO: 18.9 % — LOW (ref 20–55)
MCHC RBC-ENTMCNC: 28.2 PG — SIGNIFICANT CHANGE UP (ref 27–31)
MCHC RBC-ENTMCNC: 33.2 G/DL — SIGNIFICANT CHANGE UP (ref 32–36)
MCV RBC AUTO: 85 FL — SIGNIFICANT CHANGE UP (ref 80–94)
MONOCYTES # BLD AUTO: 0.6 K/UL — SIGNIFICANT CHANGE UP (ref 0–0.8)
MONOCYTES NFR BLD AUTO: 11.3 % — HIGH (ref 3–10)
NEUTROPHILS # BLD AUTO: 3.6 K/UL — SIGNIFICANT CHANGE UP (ref 1.8–8)
NEUTROPHILS NFR BLD AUTO: 64.2 % — SIGNIFICANT CHANGE UP (ref 37–73)
PLATELET # BLD AUTO: 357 K/UL — SIGNIFICANT CHANGE UP (ref 150–400)
POTASSIUM SERPL-MCNC: 4.7 MMOL/L — SIGNIFICANT CHANGE UP (ref 3.5–5.3)
POTASSIUM SERPL-SCNC: 4.7 MMOL/L — SIGNIFICANT CHANGE UP (ref 3.5–5.3)
PROT SERPL-MCNC: 7.7 G/DL — SIGNIFICANT CHANGE UP (ref 6.6–8.7)
PROTHROM AB SERPL-ACNC: 10.1 SEC — SIGNIFICANT CHANGE UP (ref 9.8–12.7)
RBC # BLD: 5.39 M/UL — SIGNIFICANT CHANGE UP (ref 4.6–6.2)
RBC # FLD: 12.7 % — SIGNIFICANT CHANGE UP (ref 11–15.6)
SODIUM SERPL-SCNC: 139 MMOL/L — SIGNIFICANT CHANGE UP (ref 135–145)
TYPE + AB SCN PNL BLD: SIGNIFICANT CHANGE UP
WBC # BLD: 5.6 K/UL — SIGNIFICANT CHANGE UP (ref 4.8–10.8)
WBC # FLD AUTO: 5.6 K/UL — SIGNIFICANT CHANGE UP (ref 4.8–10.8)

## 2018-10-12 PROCEDURE — 86900 BLOOD TYPING SEROLOGIC ABO: CPT

## 2018-10-12 PROCEDURE — 85027 COMPLETE CBC AUTOMATED: CPT

## 2018-10-12 PROCEDURE — 93005 ELECTROCARDIOGRAM TRACING: CPT

## 2018-10-12 PROCEDURE — G0463: CPT

## 2018-10-12 PROCEDURE — 86901 BLOOD TYPING SEROLOGIC RH(D): CPT

## 2018-10-12 PROCEDURE — 36415 COLL VENOUS BLD VENIPUNCTURE: CPT

## 2018-10-12 PROCEDURE — 83036 HEMOGLOBIN GLYCOSYLATED A1C: CPT

## 2018-10-12 PROCEDURE — 85610 PROTHROMBIN TIME: CPT

## 2018-10-12 PROCEDURE — 80053 COMPREHEN METABOLIC PANEL: CPT

## 2018-10-12 PROCEDURE — 86850 RBC ANTIBODY SCREEN: CPT

## 2018-10-12 PROCEDURE — 93010 ELECTROCARDIOGRAM REPORT: CPT

## 2018-10-12 PROCEDURE — 85730 THROMBOPLASTIN TIME PARTIAL: CPT

## 2018-10-12 RX ORDER — METRONIDAZOLE 500 MG
500 TABLET ORAL ONCE
Qty: 0 | Refills: 0 | Status: DISCONTINUED | OUTPATIENT
Start: 2018-10-24 | End: 2018-10-28

## 2018-10-12 NOTE — PATIENT PROFILE ADULT - NSPROEDALEARNPREF_GEN_A_NUR
written material/verbal instruction/individual instruction/pre-op instructions, surgical wash & pain management reviewed pt verbalized understanding

## 2018-10-12 NOTE — H&P PST ADULT - GASTROINTESTINAL COMMENTS
has a colostomy noted colostomy to the left side of abdomen draining semisoft stool. peristomal area is dry and intact.

## 2018-10-12 NOTE — H&P PST ADULT - HISTORY OF PRESENT ILLNESS
51 yo male with HTN presented to the ED last night with c/o abdominal pain , started around 5 pm sudden onset in lower abdomen sharp radiates to both abdominal area , mostly in the right lower area . It is controlled with pain meds , now intermittent . He denies any associated nausea, vomiting 53 year old male with history of diverticulitis and colostomy which was placed in June 2018, now scheduled for reversal of colostomy.

## 2018-10-12 NOTE — H&P PST ADULT - ATTENDING COMMENTS
This patient was seen and examined  He is followed as an outpatient and now wants his colostomy reversed  Discussed risks, benefits and alternatives to the operation  The patient would like to proceed.

## 2018-10-12 NOTE — H&P PST ADULT - PMH
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

## 2018-10-12 NOTE — H&P PST ADULT - FAMILY HISTORY
No pertinent family history in first degree relatives Mother  Still living? No  FH: diabetes mellitus, Age at diagnosis: Age Unknown  Family history of hypertension, Age at diagnosis: Age Unknown     Father  Still living? No  Family history of emphysema, Age at diagnosis: Age Unknown

## 2018-10-19 ENCOUNTER — APPOINTMENT (OUTPATIENT)
Dept: PULMONOLOGY | Facility: CLINIC | Age: 53
End: 2018-10-19
Payer: MEDICARE

## 2018-10-19 VITALS — HEART RATE: 80 BPM | DIASTOLIC BLOOD PRESSURE: 80 MMHG | OXYGEN SATURATION: 97 % | SYSTOLIC BLOOD PRESSURE: 120 MMHG

## 2018-10-19 VITALS — WEIGHT: 209 LBS | BODY MASS INDEX: 30.86 KG/M2

## 2018-10-19 DIAGNOSIS — Z82.5 FAMILY HISTORY OF ASTHMA AND OTHER CHRONIC LOWER RESPIRATORY DISEASES: ICD-10-CM

## 2018-10-19 DIAGNOSIS — Z00.00 ENCOUNTER FOR GENERAL ADULT MEDICAL EXAMINATION W/OUT ABNORMAL FINDINGS: ICD-10-CM

## 2018-10-19 DIAGNOSIS — R06.09 OTHER FORMS OF DYSPNEA: ICD-10-CM

## 2018-10-19 DIAGNOSIS — Z01.818 ENCOUNTER FOR OTHER PREPROCEDURAL EXAMINATION: ICD-10-CM

## 2018-10-19 DIAGNOSIS — Z87.891 PERSONAL HISTORY OF NICOTINE DEPENDENCE: ICD-10-CM

## 2018-10-19 PROCEDURE — 94060 EVALUATION OF WHEEZING: CPT

## 2018-10-19 PROCEDURE — 94664 DEMO&/EVAL PT USE INHALER: CPT | Mod: 59

## 2018-10-19 PROCEDURE — 99204 OFFICE O/P NEW MOD 45 MIN: CPT | Mod: 25

## 2018-10-19 RX ORDER — HYDROCODONE BITARTRATE AND ACETAMINOPHEN 5; 300 MG/1; MG/1
5-300 TABLET ORAL
Refills: 0 | Status: ACTIVE | COMMUNITY

## 2018-10-19 RX ORDER — ERYTHROMYCIN 500 MG/1
500 TABLET, FILM COATED ORAL
Qty: 6 | Refills: 0 | Status: DISCONTINUED | COMMUNITY
Start: 2018-09-20 | End: 2018-10-19

## 2018-10-19 RX ORDER — POLYETHYLENE GLYCOL 3350 AND ELECTROLYTES WITH LEMON FLAVOR 236; 22.74; 6.74; 5.86; 2.97 G/4L; G/4L; G/4L; G/4L; G/4L
236 POWDER, FOR SOLUTION ORAL
Qty: 1 | Refills: 0 | Status: DISCONTINUED | COMMUNITY
Start: 2018-09-20 | End: 2018-10-19

## 2018-10-19 RX ORDER — NEOMYCIN SULFATE 500 MG/1
500 TABLET ORAL
Qty: 6 | Refills: 0 | Status: DISCONTINUED | COMMUNITY
Start: 2018-09-20 | End: 2018-10-19

## 2018-10-19 RX ORDER — LOSARTAN POTASSIUM 25 MG/1
25 TABLET, FILM COATED ORAL
Refills: 0 | Status: ACTIVE | COMMUNITY

## 2018-10-24 ENCOUNTER — RESULT REVIEW (OUTPATIENT)
Age: 53
End: 2018-10-24

## 2018-10-24 ENCOUNTER — INPATIENT (INPATIENT)
Facility: HOSPITAL | Age: 53
LOS: 3 days | Discharge: HOME | DRG: 330 | End: 2018-10-28
Attending: SURGERY | Admitting: SURGERY
Payer: MEDICARE

## 2018-10-24 VITALS — WEIGHT: 209 LBS | HEIGHT: 69 IN

## 2018-10-24 DIAGNOSIS — Z98.890 OTHER SPECIFIED POSTPROCEDURAL STATES: Chronic | ICD-10-CM

## 2018-10-24 DIAGNOSIS — Z93.3 COLOSTOMY STATUS: ICD-10-CM

## 2018-10-24 DIAGNOSIS — Z93.3 COLOSTOMY STATUS: Chronic | ICD-10-CM

## 2018-10-24 LAB
ANION GAP SERPL CALC-SCNC: 17 MMOL/L — SIGNIFICANT CHANGE UP (ref 5–17)
BASOPHILS # BLD AUTO: 0 K/UL — SIGNIFICANT CHANGE UP (ref 0–0.2)
BASOPHILS NFR BLD AUTO: 0.1 % — SIGNIFICANT CHANGE UP (ref 0–2)
BUN SERPL-MCNC: 19 MG/DL — SIGNIFICANT CHANGE UP (ref 8–20)
CALCIUM SERPL-MCNC: 8.4 MG/DL — LOW (ref 8.6–10.2)
CHLORIDE SERPL-SCNC: 99 MMOL/L — SIGNIFICANT CHANGE UP (ref 98–107)
CO2 SERPL-SCNC: 19 MMOL/L — LOW (ref 22–29)
CREAT SERPL-MCNC: 1.53 MG/DL — HIGH (ref 0.5–1.3)
EOSINOPHIL # BLD AUTO: 0 K/UL — SIGNIFICANT CHANGE UP (ref 0–0.5)
EOSINOPHIL NFR BLD AUTO: 0 % — SIGNIFICANT CHANGE UP (ref 0–5)
GLUCOSE BLDC GLUCOMTR-MCNC: 111 MG/DL — HIGH (ref 70–99)
GLUCOSE BLDC GLUCOMTR-MCNC: 123 MG/DL — HIGH (ref 70–99)
GLUCOSE BLDC GLUCOMTR-MCNC: 171 MG/DL — HIGH (ref 70–99)
GLUCOSE SERPL-MCNC: 176 MG/DL — HIGH (ref 70–115)
HCT VFR BLD CALC: 44.3 % — SIGNIFICANT CHANGE UP (ref 42–52)
HGB BLD-MCNC: 15 G/DL — SIGNIFICANT CHANGE UP (ref 14–18)
LYMPHOCYTES # BLD AUTO: 0.9 K/UL — LOW (ref 1–4.8)
LYMPHOCYTES # BLD AUTO: 4.5 % — LOW (ref 20–55)
MCHC RBC-ENTMCNC: 29.5 PG — SIGNIFICANT CHANGE UP (ref 27–31)
MCHC RBC-ENTMCNC: 33.9 G/DL — SIGNIFICANT CHANGE UP (ref 32–36)
MCV RBC AUTO: 87.2 FL — SIGNIFICANT CHANGE UP (ref 80–94)
MONOCYTES # BLD AUTO: 1.3 K/UL — HIGH (ref 0–0.8)
MONOCYTES NFR BLD AUTO: 6.9 % — SIGNIFICANT CHANGE UP (ref 3–10)
NEUTROPHILS # BLD AUTO: 17 K/UL — HIGH (ref 1.8–8)
NEUTROPHILS NFR BLD AUTO: 88.1 % — HIGH (ref 37–73)
PLATELET # BLD AUTO: 342 K/UL — SIGNIFICANT CHANGE UP (ref 150–400)
POTASSIUM SERPL-MCNC: 5.1 MMOL/L — SIGNIFICANT CHANGE UP (ref 3.5–5.3)
POTASSIUM SERPL-SCNC: 5.1 MMOL/L — SIGNIFICANT CHANGE UP (ref 3.5–5.3)
RBC # BLD: 5.08 M/UL — SIGNIFICANT CHANGE UP (ref 4.6–6.2)
RBC # FLD: 13 % — SIGNIFICANT CHANGE UP (ref 11–15.6)
SODIUM SERPL-SCNC: 135 MMOL/L — SIGNIFICANT CHANGE UP (ref 135–145)
WBC # BLD: 19.3 K/UL — HIGH (ref 4.8–10.8)
WBC # FLD AUTO: 19.3 K/UL — HIGH (ref 4.8–10.8)

## 2018-10-24 PROCEDURE — 88304 TISSUE EXAM BY PATHOLOGIST: CPT | Mod: 26

## 2018-10-24 PROCEDURE — 44626 REPAIR BOWEL OPENING: CPT

## 2018-10-24 PROCEDURE — 88302 TISSUE EXAM BY PATHOLOGIST: CPT | Mod: 26

## 2018-10-24 PROCEDURE — 44310 ILEOSTOMY/JEJUNOSTOMY: CPT

## 2018-10-24 PROCEDURE — 88307 TISSUE EXAM BY PATHOLOGIST: CPT | Mod: 26

## 2018-10-24 RX ORDER — ENOXAPARIN SODIUM 100 MG/ML
40 INJECTION SUBCUTANEOUS EVERY 24 HOURS
Qty: 0 | Refills: 0 | Status: DISCONTINUED | OUTPATIENT
Start: 2018-10-24 | End: 2018-10-28

## 2018-10-24 RX ORDER — FENTANYL CITRATE 50 UG/ML
50 INJECTION INTRAVENOUS
Qty: 0 | Refills: 0 | Status: DISCONTINUED | OUTPATIENT
Start: 2018-10-24 | End: 2018-10-24

## 2018-10-24 RX ORDER — ALVIMOPAN 12 MG/1
12 CAPSULE ORAL ONCE
Qty: 0 | Refills: 0 | Status: COMPLETED | OUTPATIENT
Start: 2018-10-24 | End: 2018-10-24

## 2018-10-24 RX ORDER — NALOXONE HYDROCHLORIDE 4 MG/.1ML
0.1 SPRAY NASAL
Qty: 0 | Refills: 0 | Status: DISCONTINUED | OUTPATIENT
Start: 2018-10-24 | End: 2018-10-28

## 2018-10-24 RX ORDER — ONDANSETRON 8 MG/1
4 TABLET, FILM COATED ORAL ONCE
Qty: 0 | Refills: 0 | Status: DISCONTINUED | OUTPATIENT
Start: 2018-10-24 | End: 2018-10-24

## 2018-10-24 RX ORDER — CEFAZOLIN SODIUM 1 G
2000 VIAL (EA) INJECTION ONCE
Qty: 0 | Refills: 0 | Status: COMPLETED | OUTPATIENT
Start: 2018-10-24 | End: 2018-10-24

## 2018-10-24 RX ORDER — LOSARTAN POTASSIUM 100 MG/1
25 TABLET, FILM COATED ORAL DAILY
Qty: 0 | Refills: 0 | Status: DISCONTINUED | OUTPATIENT
Start: 2018-10-24 | End: 2018-10-28

## 2018-10-24 RX ORDER — HYDROMORPHONE HYDROCHLORIDE 2 MG/ML
30 INJECTION INTRAMUSCULAR; INTRAVENOUS; SUBCUTANEOUS
Qty: 0 | Refills: 0 | Status: DISCONTINUED | OUTPATIENT
Start: 2018-10-24 | End: 2018-10-26

## 2018-10-24 RX ORDER — SODIUM CHLORIDE 9 MG/ML
1000 INJECTION, SOLUTION INTRAVENOUS
Qty: 0 | Refills: 0 | Status: DISCONTINUED | OUTPATIENT
Start: 2018-10-24 | End: 2018-10-24

## 2018-10-24 RX ORDER — ACETAMINOPHEN 500 MG
650 TABLET ORAL EVERY 6 HOURS
Qty: 0 | Refills: 0 | Status: DISCONTINUED | OUTPATIENT
Start: 2018-10-24 | End: 2018-10-28

## 2018-10-24 RX ORDER — SODIUM CHLORIDE 9 MG/ML
1000 INJECTION, SOLUTION INTRAVENOUS
Qty: 0 | Refills: 0 | Status: DISCONTINUED | OUTPATIENT
Start: 2018-10-24 | End: 2018-10-25

## 2018-10-24 RX ORDER — HEPARIN SODIUM 5000 [USP'U]/ML
5000 INJECTION INTRAVENOUS; SUBCUTANEOUS ONCE
Qty: 0 | Refills: 0 | Status: COMPLETED | OUTPATIENT
Start: 2018-10-24 | End: 2018-10-24

## 2018-10-24 RX ORDER — IBUPROFEN 200 MG
400 TABLET ORAL EVERY 8 HOURS
Qty: 0 | Refills: 0 | Status: DISCONTINUED | OUTPATIENT
Start: 2018-10-24 | End: 2018-10-28

## 2018-10-24 RX ORDER — ONDANSETRON 8 MG/1
4 TABLET, FILM COATED ORAL EVERY 6 HOURS
Qty: 0 | Refills: 0 | Status: DISCONTINUED | OUTPATIENT
Start: 2018-10-24 | End: 2018-10-28

## 2018-10-24 RX ORDER — ALVIMOPAN 12 MG/1
12 CAPSULE ORAL
Qty: 0 | Refills: 0 | Status: DISCONTINUED | OUTPATIENT
Start: 2018-10-24 | End: 2018-10-28

## 2018-10-24 RX ORDER — SODIUM CHLORIDE 9 MG/ML
3 INJECTION INTRAMUSCULAR; INTRAVENOUS; SUBCUTANEOUS EVERY 8 HOURS
Qty: 0 | Refills: 0 | Status: DISCONTINUED | OUTPATIENT
Start: 2018-10-24 | End: 2018-10-24

## 2018-10-24 RX ORDER — HYDROMORPHONE HYDROCHLORIDE 2 MG/ML
0.5 INJECTION INTRAMUSCULAR; INTRAVENOUS; SUBCUTANEOUS
Qty: 0 | Refills: 0 | Status: DISCONTINUED | OUTPATIENT
Start: 2018-10-24 | End: 2018-10-24

## 2018-10-24 RX ADMIN — HYDROMORPHONE HYDROCHLORIDE 0.5 MILLIGRAM(S): 2 INJECTION INTRAMUSCULAR; INTRAVENOUS; SUBCUTANEOUS at 19:30

## 2018-10-24 RX ADMIN — Medication 100 MILLIGRAM(S): at 13:02

## 2018-10-24 RX ADMIN — HYDROMORPHONE HYDROCHLORIDE 0.5 MILLIGRAM(S): 2 INJECTION INTRAMUSCULAR; INTRAVENOUS; SUBCUTANEOUS at 19:25

## 2018-10-24 RX ADMIN — HYDROMORPHONE HYDROCHLORIDE 0.5 MILLIGRAM(S): 2 INJECTION INTRAMUSCULAR; INTRAVENOUS; SUBCUTANEOUS at 19:35

## 2018-10-24 RX ADMIN — FENTANYL CITRATE 50 MICROGRAM(S): 50 INJECTION INTRAVENOUS at 19:15

## 2018-10-24 RX ADMIN — FENTANYL CITRATE 50 MICROGRAM(S): 50 INJECTION INTRAVENOUS at 19:04

## 2018-10-24 RX ADMIN — ALVIMOPAN 12 MILLIGRAM(S): 12 CAPSULE ORAL at 09:32

## 2018-10-24 RX ADMIN — HYDROMORPHONE HYDROCHLORIDE 0.5 MILLIGRAM(S): 2 INJECTION INTRAMUSCULAR; INTRAVENOUS; SUBCUTANEOUS at 19:45

## 2018-10-24 RX ADMIN — Medication 650 MILLIGRAM(S): at 23:44

## 2018-10-24 RX ADMIN — HYDROMORPHONE HYDROCHLORIDE 30 MILLILITER(S): 2 INJECTION INTRAMUSCULAR; INTRAVENOUS; SUBCUTANEOUS at 19:40

## 2018-10-24 RX ADMIN — FENTANYL CITRATE 50 MICROGRAM(S): 50 INJECTION INTRAVENOUS at 18:56

## 2018-10-24 RX ADMIN — HYDROMORPHONE HYDROCHLORIDE 30 MILLILITER(S): 2 INJECTION INTRAMUSCULAR; INTRAVENOUS; SUBCUTANEOUS at 22:17

## 2018-10-24 RX ADMIN — HEPARIN SODIUM 5000 UNIT(S): 5000 INJECTION INTRAVENOUS; SUBCUTANEOUS at 09:33

## 2018-10-24 RX ADMIN — FENTANYL CITRATE 50 MICROGRAM(S): 50 INJECTION INTRAVENOUS at 19:20

## 2018-10-24 NOTE — BRIEF OPERATIVE NOTE - PROCEDURE
<<-----Click on this checkbox to enter Procedure Exploratory laparotomy  10/24/2018  DIAMOND , repair of rectal tear, Re reversal , resection of upper portion of rectal stump. DIverting ileostomy.placed in previous ostomy site with narrowing of parastomal hernia  Active  JRIAD

## 2018-10-25 DIAGNOSIS — G89.18 OTHER ACUTE POSTPROCEDURAL PAIN: ICD-10-CM

## 2018-10-25 LAB
ANION GAP SERPL CALC-SCNC: 13 MMOL/L — SIGNIFICANT CHANGE UP (ref 5–17)
ANION GAP SERPL CALC-SCNC: 15 MMOL/L — SIGNIFICANT CHANGE UP (ref 5–17)
BUN SERPL-MCNC: 23 MG/DL — HIGH (ref 8–20)
BUN SERPL-MCNC: 24 MG/DL — HIGH (ref 8–20)
CALCIUM SERPL-MCNC: 8.9 MG/DL — SIGNIFICANT CHANGE UP (ref 8.6–10.2)
CALCIUM SERPL-MCNC: 9.1 MG/DL — SIGNIFICANT CHANGE UP (ref 8.6–10.2)
CHLORIDE SERPL-SCNC: 96 MMOL/L — LOW (ref 98–107)
CHLORIDE SERPL-SCNC: 97 MMOL/L — LOW (ref 98–107)
CO2 SERPL-SCNC: 24 MMOL/L — SIGNIFICANT CHANGE UP (ref 22–29)
CO2 SERPL-SCNC: 25 MMOL/L — SIGNIFICANT CHANGE UP (ref 22–29)
CREAT SERPL-MCNC: 1.32 MG/DL — HIGH (ref 0.5–1.3)
CREAT SERPL-MCNC: 1.42 MG/DL — HIGH (ref 0.5–1.3)
GLUCOSE SERPL-MCNC: 152 MG/DL — HIGH (ref 70–115)
GLUCOSE SERPL-MCNC: 162 MG/DL — HIGH (ref 70–115)
HCT VFR BLD CALC: 43.6 % — SIGNIFICANT CHANGE UP (ref 42–52)
HGB BLD-MCNC: 14.8 G/DL — SIGNIFICANT CHANGE UP (ref 14–18)
LYMPHOCYTES # BLD AUTO: 5 % — LOW (ref 20–55)
MAGNESIUM SERPL-MCNC: 1.4 MG/DL — LOW (ref 1.6–2.6)
MCHC RBC-ENTMCNC: 29.6 PG — SIGNIFICANT CHANGE UP (ref 27–31)
MCHC RBC-ENTMCNC: 33.9 G/DL — SIGNIFICANT CHANGE UP (ref 32–36)
MCV RBC AUTO: 87.2 FL — SIGNIFICANT CHANGE UP (ref 80–94)
MONOCYTES NFR BLD AUTO: 6 % — SIGNIFICANT CHANGE UP (ref 3–10)
NEUTROPHILS NFR BLD AUTO: 82 % — HIGH (ref 37–73)
PHOSPHATE SERPL-MCNC: 4 MG/DL — SIGNIFICANT CHANGE UP (ref 2.4–4.7)
PLATELET # BLD AUTO: 312 K/UL — SIGNIFICANT CHANGE UP (ref 150–400)
POTASSIUM SERPL-MCNC: 5.1 MMOL/L — SIGNIFICANT CHANGE UP (ref 3.5–5.3)
POTASSIUM SERPL-MCNC: 5.5 MMOL/L — HIGH (ref 3.5–5.3)
POTASSIUM SERPL-SCNC: 5.1 MMOL/L — SIGNIFICANT CHANGE UP (ref 3.5–5.3)
POTASSIUM SERPL-SCNC: 5.5 MMOL/L — HIGH (ref 3.5–5.3)
RBC # BLD: 5 M/UL — SIGNIFICANT CHANGE UP (ref 4.6–6.2)
RBC # FLD: 13 % — SIGNIFICANT CHANGE UP (ref 11–15.6)
SODIUM SERPL-SCNC: 134 MMOL/L — LOW (ref 135–145)
SODIUM SERPL-SCNC: 136 MMOL/L — SIGNIFICANT CHANGE UP (ref 135–145)
WBC # BLD: 14.2 K/UL — HIGH (ref 4.8–10.8)
WBC # FLD AUTO: 14.2 K/UL — HIGH (ref 4.8–10.8)

## 2018-10-25 PROCEDURE — 93010 ELECTROCARDIOGRAM REPORT: CPT

## 2018-10-25 RX ORDER — SODIUM CHLORIDE 9 MG/ML
1000 INJECTION, SOLUTION INTRAVENOUS ONCE
Qty: 0 | Refills: 0 | Status: COMPLETED | OUTPATIENT
Start: 2018-10-25 | End: 2018-10-25

## 2018-10-25 RX ORDER — SODIUM CHLORIDE 9 MG/ML
1000 INJECTION INTRAMUSCULAR; INTRAVENOUS; SUBCUTANEOUS
Qty: 0 | Refills: 0 | Status: DISCONTINUED | OUTPATIENT
Start: 2018-10-25 | End: 2018-10-26

## 2018-10-25 RX ORDER — SODIUM CHLORIDE 9 MG/ML
1000 INJECTION, SOLUTION INTRAVENOUS ONCE
Qty: 0 | Refills: 0 | Status: DISCONTINUED | OUTPATIENT
Start: 2018-10-25 | End: 2018-10-25

## 2018-10-25 RX ORDER — MAGNESIUM SULFATE 500 MG/ML
2 VIAL (ML) INJECTION
Qty: 0 | Refills: 0 | Status: COMPLETED | OUTPATIENT
Start: 2018-10-25 | End: 2018-10-25

## 2018-10-25 RX ADMIN — Medication 650 MILLIGRAM(S): at 12:35

## 2018-10-25 RX ADMIN — Medication 650 MILLIGRAM(S): at 00:30

## 2018-10-25 RX ADMIN — SODIUM CHLORIDE 125 MILLILITER(S): 9 INJECTION INTRAMUSCULAR; INTRAVENOUS; SUBCUTANEOUS at 14:30

## 2018-10-25 RX ADMIN — ALVIMOPAN 12 MILLIGRAM(S): 12 CAPSULE ORAL at 17:13

## 2018-10-25 RX ADMIN — Medication 50 GRAM(S): at 15:45

## 2018-10-25 RX ADMIN — Medication 650 MILLIGRAM(S): at 23:16

## 2018-10-25 RX ADMIN — HYDROMORPHONE HYDROCHLORIDE 30 MILLILITER(S): 2 INJECTION INTRAMUSCULAR; INTRAVENOUS; SUBCUTANEOUS at 19:43

## 2018-10-25 RX ADMIN — ENOXAPARIN SODIUM 40 MILLIGRAM(S): 100 INJECTION SUBCUTANEOUS at 06:02

## 2018-10-25 RX ADMIN — Medication 650 MILLIGRAM(S): at 06:02

## 2018-10-25 RX ADMIN — Medication 650 MILLIGRAM(S): at 17:50

## 2018-10-25 RX ADMIN — Medication 50 GRAM(S): at 17:11

## 2018-10-25 RX ADMIN — Medication 650 MILLIGRAM(S): at 17:12

## 2018-10-25 RX ADMIN — LOSARTAN POTASSIUM 25 MILLIGRAM(S): 100 TABLET, FILM COATED ORAL at 06:01

## 2018-10-25 RX ADMIN — HYDROMORPHONE HYDROCHLORIDE 30 MILLILITER(S): 2 INJECTION INTRAMUSCULAR; INTRAVENOUS; SUBCUTANEOUS at 07:13

## 2018-10-25 RX ADMIN — Medication 650 MILLIGRAM(S): at 13:10

## 2018-10-25 RX ADMIN — ALVIMOPAN 12 MILLIGRAM(S): 12 CAPSULE ORAL at 06:02

## 2018-10-25 NOTE — PROGRESS NOTE ADULT - ATTENDING COMMENTS
Pain controlled. Tolerating clears this morning.   NAD, AAOx3  Abdomen is soft, appropriately tender at incision site. Ileostomy is pink, no output. No guarding. No rebound tenderness  Rogers catheter in place    Leukcosytosis  DAVID    A/P s/p colostomy reversal with intraoperative rectal injury that was repaired; colostomy reversal; protective ileostomy  - Continue clears; awaiting return of bowel function  - Rogers catheter - monitor UOP closely and trend Crea  - DVT prophylaxis  - Encourage ambulation and IS

## 2018-10-25 NOTE — CHART NOTE - NSCHARTNOTEFT_GEN_A_CORE
POST OP CHECK    s/p michael's procedure with diverting ileostomy, michael reversal with rectal tear. Patient seen and examined at bedside. Resting comfortably in bed. Currently using a PCA for pain control. Low UOP, 50 in PACU and ~50 on the floor. Otherwise is NPO and denies feeling nauseous. Denies f/c/n/v/cp/sob. VSS. Afebrile.    PE:  gen: wdwn, nad  cv: rrr  resp: POST OP CHECK    s/p michael's procedure with diverting ileostomy, michael reversal with rectal tear. Patient seen and examined at bedside. Resting comfortably in bed. Currently using a PCA for pain control. Low UOP, 50 in PACU and ~50 on the floor. Otherwise is NPO and denies feeling nauseous. Denies f/c/n/v/cp/sob. VSS. Afebrile.    PE:  gen: wdwn, nad  cv: rrr  resp: nonlabored breathing, but on O2 d/t PCA protocol  abd: soft, nd, mild tenderness to palpation on LUQ. Midline dressing intact with moderate serosang strikethrough on sup and inf aspects of wound. Stoma is edematous and dark/maroon red with serous/sweat output.  msk: no c/c/e    a/p:   53yoM s/p Harmann's procedure with diverting ileostomy, harmann reversal with rectal tear POD #1. Patient's pain is well controlled but his urine output remains low. VSS. Afebrile. NPO for now with IVF, and and almodovar to remain in until tomorrow.  - 1L LR  - monitor UOP  - keep almodovar in  - NPO/IVF  - monitor ostomy output  - encourage OOB and IS use POST OP CHECK    s/p michael's procedure with diverting ileostomy, michael reversal with rectal tear. Patient seen and examined at bedside. Resting comfortably in bed. Currently using a PCA for pain control. Low UOP, 50 in PACU and ~50 on the floor. Otherwise is NPO and denies feeling nauseous. Denies f/c/n/v/cp/sob. VSS. Afebrile.    PE:  gen: wdwn, nad  cv: rrr  resp: nonlabored breathing, but on O2 d/t PCA protocol  abd: soft, nd, mild tenderness to palpation on LUQ. Midline dressing intact with moderate serosang strikethrough on sup and inf aspects of wound. Stoma is edematous and red with serous/sweat output.  msk: no c/c/e    a/p:   53yoM s/p Harmann's procedure with diverting ileostomy, harmann reversal with rectal tear POD #1. Patient's pain is well controlled but his urine output remains low. VSS. Afebrile. NPO for now with IVF, and and almodovar to remain in until tomorrow.  - 1L LR  - monitor UOP  - keep almodovar in  - NPO/IVF  - monitor ostomy output  - encourage OOB and IS use

## 2018-10-26 LAB
ANION GAP SERPL CALC-SCNC: 8 MMOL/L — SIGNIFICANT CHANGE UP (ref 5–17)
BUN SERPL-MCNC: 22 MG/DL — HIGH (ref 8–20)
CALCIUM SERPL-MCNC: 8.5 MG/DL — LOW (ref 8.6–10.2)
CHLORIDE SERPL-SCNC: 99 MMOL/L — SIGNIFICANT CHANGE UP (ref 98–107)
CO2 SERPL-SCNC: 29 MMOL/L — SIGNIFICANT CHANGE UP (ref 22–29)
CREAT SERPL-MCNC: 1.12 MG/DL — SIGNIFICANT CHANGE UP (ref 0.5–1.3)
GLUCOSE SERPL-MCNC: 112 MG/DL — SIGNIFICANT CHANGE UP (ref 70–115)
HCT VFR BLD CALC: 36.5 % — LOW (ref 42–52)
HGB BLD-MCNC: 11.7 G/DL — LOW (ref 14–18)
MAGNESIUM SERPL-MCNC: 2.4 MG/DL — SIGNIFICANT CHANGE UP (ref 1.6–2.6)
MCHC RBC-ENTMCNC: 27.9 PG — SIGNIFICANT CHANGE UP (ref 27–31)
MCHC RBC-ENTMCNC: 32.1 G/DL — SIGNIFICANT CHANGE UP (ref 32–36)
MCV RBC AUTO: 86.9 FL — SIGNIFICANT CHANGE UP (ref 80–94)
PHOSPHATE SERPL-MCNC: 3.3 MG/DL — SIGNIFICANT CHANGE UP (ref 2.4–4.7)
PLATELET # BLD AUTO: 304 K/UL — SIGNIFICANT CHANGE UP (ref 150–400)
POTASSIUM SERPL-MCNC: 5 MMOL/L — SIGNIFICANT CHANGE UP (ref 3.5–5.3)
POTASSIUM SERPL-SCNC: 5 MMOL/L — SIGNIFICANT CHANGE UP (ref 3.5–5.3)
RBC # BLD: 4.2 M/UL — LOW (ref 4.6–6.2)
RBC # FLD: 13.1 % — SIGNIFICANT CHANGE UP (ref 11–15.6)
SODIUM SERPL-SCNC: 136 MMOL/L — SIGNIFICANT CHANGE UP (ref 135–145)
WBC # BLD: 14.6 K/UL — HIGH (ref 4.8–10.8)
WBC # FLD AUTO: 14.6 K/UL — HIGH (ref 4.8–10.8)

## 2018-10-26 RX ORDER — PANTOPRAZOLE SODIUM 20 MG/1
40 TABLET, DELAYED RELEASE ORAL
Qty: 0 | Refills: 0 | Status: DISCONTINUED | OUTPATIENT
Start: 2018-10-26 | End: 2018-10-28

## 2018-10-26 RX ORDER — OXYCODONE HYDROCHLORIDE 5 MG/1
10 TABLET ORAL EVERY 4 HOURS
Qty: 0 | Refills: 0 | Status: DISCONTINUED | OUTPATIENT
Start: 2018-10-26 | End: 2018-10-28

## 2018-10-26 RX ORDER — OXYCODONE HYDROCHLORIDE 5 MG/1
5 TABLET ORAL EVERY 4 HOURS
Qty: 0 | Refills: 0 | Status: DISCONTINUED | OUTPATIENT
Start: 2018-10-26 | End: 2018-10-28

## 2018-10-26 RX ADMIN — HYDROMORPHONE HYDROCHLORIDE 30 MILLILITER(S): 2 INJECTION INTRAMUSCULAR; INTRAVENOUS; SUBCUTANEOUS at 07:12

## 2018-10-26 RX ADMIN — OXYCODONE HYDROCHLORIDE 5 MILLIGRAM(S): 5 TABLET ORAL at 10:08

## 2018-10-26 RX ADMIN — Medication 650 MILLIGRAM(S): at 13:30

## 2018-10-26 RX ADMIN — LOSARTAN POTASSIUM 25 MILLIGRAM(S): 100 TABLET, FILM COATED ORAL at 06:42

## 2018-10-26 RX ADMIN — SODIUM CHLORIDE 125 MILLILITER(S): 9 INJECTION INTRAMUSCULAR; INTRAVENOUS; SUBCUTANEOUS at 06:50

## 2018-10-26 RX ADMIN — Medication 650 MILLIGRAM(S): at 00:00

## 2018-10-26 RX ADMIN — Medication 650 MILLIGRAM(S): at 06:42

## 2018-10-26 RX ADMIN — ONDANSETRON 4 MILLIGRAM(S): 8 TABLET, FILM COATED ORAL at 15:16

## 2018-10-26 RX ADMIN — Medication 650 MILLIGRAM(S): at 23:15

## 2018-10-26 RX ADMIN — Medication 650 MILLIGRAM(S): at 06:50

## 2018-10-26 RX ADMIN — OXYCODONE HYDROCHLORIDE 5 MILLIGRAM(S): 5 TABLET ORAL at 22:25

## 2018-10-26 RX ADMIN — Medication 650 MILLIGRAM(S): at 22:25

## 2018-10-26 RX ADMIN — ALVIMOPAN 12 MILLIGRAM(S): 12 CAPSULE ORAL at 06:43

## 2018-10-26 RX ADMIN — OXYCODONE HYDROCHLORIDE 5 MILLIGRAM(S): 5 TABLET ORAL at 23:15

## 2018-10-26 RX ADMIN — OXYCODONE HYDROCHLORIDE 5 MILLIGRAM(S): 5 TABLET ORAL at 10:40

## 2018-10-26 RX ADMIN — Medication 650 MILLIGRAM(S): at 12:54

## 2018-10-26 RX ADMIN — ENOXAPARIN SODIUM 40 MILLIGRAM(S): 100 INJECTION SUBCUTANEOUS at 06:43

## 2018-10-26 RX ADMIN — ALVIMOPAN 12 MILLIGRAM(S): 12 CAPSULE ORAL at 17:26

## 2018-10-26 NOTE — PROGRESS NOTE ADULT - ATTENDING COMMENTS
Pain controlled. No nausea. +air in ostomy bag. Tolerating clears. Abdomen is soft, incision site is clean and intact, ileostomy is pink with air in bag. Abdomen with midl distension and mild tenderness appropriate along incision site. No guarding. No rebound. DAVID resolved.    Advance diet  Remove almodovar catheter  encourage IS and ambulation  DVT prophylaxis

## 2018-10-27 LAB
ANION GAP SERPL CALC-SCNC: 12 MMOL/L — SIGNIFICANT CHANGE UP (ref 5–17)
BUN SERPL-MCNC: 19 MG/DL — SIGNIFICANT CHANGE UP (ref 8–20)
CALCIUM SERPL-MCNC: 9.2 MG/DL — SIGNIFICANT CHANGE UP (ref 8.6–10.2)
CHLORIDE SERPL-SCNC: 94 MMOL/L — LOW (ref 98–107)
CO2 SERPL-SCNC: 29 MMOL/L — SIGNIFICANT CHANGE UP (ref 22–29)
CREAT SERPL-MCNC: 0.96 MG/DL — SIGNIFICANT CHANGE UP (ref 0.5–1.3)
EOSINOPHIL # BLD AUTO: 0.1 K/UL — SIGNIFICANT CHANGE UP (ref 0–0.5)
EOSINOPHIL NFR BLD AUTO: 1.3 % — SIGNIFICANT CHANGE UP (ref 0–5)
GLUCOSE SERPL-MCNC: 124 MG/DL — HIGH (ref 70–115)
HCT VFR BLD CALC: 36.7 % — LOW (ref 42–52)
HGB BLD-MCNC: 12.1 G/DL — LOW (ref 14–18)
LYMPHOCYTES # BLD AUTO: 1 K/UL — SIGNIFICANT CHANGE UP (ref 1–4.8)
LYMPHOCYTES # BLD AUTO: 10.8 % — LOW (ref 20–55)
MAGNESIUM SERPL-MCNC: 1.9 MG/DL — SIGNIFICANT CHANGE UP (ref 1.6–2.6)
MCHC RBC-ENTMCNC: 28.9 PG — SIGNIFICANT CHANGE UP (ref 27–31)
MCHC RBC-ENTMCNC: 33 G/DL — SIGNIFICANT CHANGE UP (ref 32–36)
MCV RBC AUTO: 87.8 FL — SIGNIFICANT CHANGE UP (ref 80–94)
MONOCYTES # BLD AUTO: 0.9 K/UL — HIGH (ref 0–0.8)
MONOCYTES NFR BLD AUTO: 9.5 % — SIGNIFICANT CHANGE UP (ref 3–10)
NEUTROPHILS # BLD AUTO: 7.2 K/UL — SIGNIFICANT CHANGE UP (ref 1.8–8)
NEUTROPHILS NFR BLD AUTO: 78.1 % — HIGH (ref 37–73)
PHOSPHATE SERPL-MCNC: 3.4 MG/DL — SIGNIFICANT CHANGE UP (ref 2.4–4.7)
PLATELET # BLD AUTO: 274 K/UL — SIGNIFICANT CHANGE UP (ref 150–400)
POTASSIUM SERPL-MCNC: 4.2 MMOL/L — SIGNIFICANT CHANGE UP (ref 3.5–5.3)
POTASSIUM SERPL-SCNC: 4.2 MMOL/L — SIGNIFICANT CHANGE UP (ref 3.5–5.3)
RBC # BLD: 4.18 M/UL — LOW (ref 4.6–6.2)
RBC # FLD: 13.1 % — SIGNIFICANT CHANGE UP (ref 11–15.6)
SODIUM SERPL-SCNC: 135 MMOL/L — SIGNIFICANT CHANGE UP (ref 135–145)
WBC # BLD: 9.2 K/UL — SIGNIFICANT CHANGE UP (ref 4.8–10.8)
WBC # FLD AUTO: 9.2 K/UL — SIGNIFICANT CHANGE UP (ref 4.8–10.8)

## 2018-10-27 RX ORDER — CALCIUM CARBONATE 500(1250)
1 TABLET ORAL ONCE
Qty: 0 | Refills: 0 | Status: COMPLETED | OUTPATIENT
Start: 2018-10-27 | End: 2018-10-27

## 2018-10-27 RX ADMIN — LOSARTAN POTASSIUM 25 MILLIGRAM(S): 100 TABLET, FILM COATED ORAL at 06:19

## 2018-10-27 RX ADMIN — ALVIMOPAN 12 MILLIGRAM(S): 12 CAPSULE ORAL at 18:13

## 2018-10-27 RX ADMIN — OXYCODONE HYDROCHLORIDE 5 MILLIGRAM(S): 5 TABLET ORAL at 06:20

## 2018-10-27 RX ADMIN — OXYCODONE HYDROCHLORIDE 5 MILLIGRAM(S): 5 TABLET ORAL at 07:58

## 2018-10-27 RX ADMIN — ALVIMOPAN 12 MILLIGRAM(S): 12 CAPSULE ORAL at 06:19

## 2018-10-27 RX ADMIN — Medication 650 MILLIGRAM(S): at 07:57

## 2018-10-27 RX ADMIN — PANTOPRAZOLE SODIUM 40 MILLIGRAM(S): 20 TABLET, DELAYED RELEASE ORAL at 06:19

## 2018-10-27 RX ADMIN — ENOXAPARIN SODIUM 40 MILLIGRAM(S): 100 INJECTION SUBCUTANEOUS at 06:19

## 2018-10-27 RX ADMIN — Medication 1 TABLET(S): at 06:19

## 2018-10-27 RX ADMIN — Medication 650 MILLIGRAM(S): at 06:19

## 2018-10-27 RX ADMIN — Medication 650 MILLIGRAM(S): at 16:58

## 2018-10-27 RX ADMIN — Medication 650 MILLIGRAM(S): at 16:27

## 2018-10-27 RX ADMIN — OXYCODONE HYDROCHLORIDE 5 MILLIGRAM(S): 5 TABLET ORAL at 22:50

## 2018-10-27 RX ADMIN — OXYCODONE HYDROCHLORIDE 5 MILLIGRAM(S): 5 TABLET ORAL at 21:50

## 2018-10-28 ENCOUNTER — TRANSCRIPTION ENCOUNTER (OUTPATIENT)
Age: 53
End: 2018-10-28

## 2018-10-28 VITALS
HEART RATE: 94 BPM | DIASTOLIC BLOOD PRESSURE: 90 MMHG | SYSTOLIC BLOOD PRESSURE: 125 MMHG | OXYGEN SATURATION: 94 % | RESPIRATION RATE: 18 BRPM | TEMPERATURE: 97 F

## 2018-10-28 LAB
ANION GAP SERPL CALC-SCNC: 14 MMOL/L — SIGNIFICANT CHANGE UP (ref 5–17)
BASOPHILS # BLD AUTO: 0 K/UL — SIGNIFICANT CHANGE UP (ref 0–0.2)
BASOPHILS NFR BLD AUTO: 0.1 % — SIGNIFICANT CHANGE UP (ref 0–2)
BUN SERPL-MCNC: 18 MG/DL — SIGNIFICANT CHANGE UP (ref 8–20)
CALCIUM SERPL-MCNC: 9.1 MG/DL — SIGNIFICANT CHANGE UP (ref 8.6–10.2)
CHLORIDE SERPL-SCNC: 96 MMOL/L — LOW (ref 98–107)
CO2 SERPL-SCNC: 26 MMOL/L — SIGNIFICANT CHANGE UP (ref 22–29)
CREAT SERPL-MCNC: 0.94 MG/DL — SIGNIFICANT CHANGE UP (ref 0.5–1.3)
EOSINOPHIL # BLD AUTO: 0.2 K/UL — SIGNIFICANT CHANGE UP (ref 0–0.5)
EOSINOPHIL NFR BLD AUTO: 3.7 % — SIGNIFICANT CHANGE UP (ref 0–5)
GLUCOSE SERPL-MCNC: 117 MG/DL — HIGH (ref 70–115)
HCT VFR BLD CALC: 36.7 % — LOW (ref 42–52)
HGB BLD-MCNC: 12.5 G/DL — LOW (ref 14–18)
LYMPHOCYTES # BLD AUTO: 1.2 K/UL — SIGNIFICANT CHANGE UP (ref 1–4.8)
LYMPHOCYTES # BLD AUTO: 17.9 % — LOW (ref 20–55)
MAGNESIUM SERPL-MCNC: 1.8 MG/DL — SIGNIFICANT CHANGE UP (ref 1.6–2.6)
MCHC RBC-ENTMCNC: 29.6 PG — SIGNIFICANT CHANGE UP (ref 27–31)
MCHC RBC-ENTMCNC: 34.1 G/DL — SIGNIFICANT CHANGE UP (ref 32–36)
MCV RBC AUTO: 86.8 FL — SIGNIFICANT CHANGE UP (ref 80–94)
MONOCYTES # BLD AUTO: 0.6 K/UL — SIGNIFICANT CHANGE UP (ref 0–0.8)
MONOCYTES NFR BLD AUTO: 9.3 % — SIGNIFICANT CHANGE UP (ref 3–10)
NEUTROPHILS # BLD AUTO: 4.6 K/UL — SIGNIFICANT CHANGE UP (ref 1.8–8)
NEUTROPHILS NFR BLD AUTO: 68.4 % — SIGNIFICANT CHANGE UP (ref 37–73)
PHOSPHATE SERPL-MCNC: 4.2 MG/DL — SIGNIFICANT CHANGE UP (ref 2.4–4.7)
PLATELET # BLD AUTO: 313 K/UL — SIGNIFICANT CHANGE UP (ref 150–400)
POTASSIUM SERPL-MCNC: 4.2 MMOL/L — SIGNIFICANT CHANGE UP (ref 3.5–5.3)
POTASSIUM SERPL-SCNC: 4.2 MMOL/L — SIGNIFICANT CHANGE UP (ref 3.5–5.3)
RBC # BLD: 4.23 M/UL — LOW (ref 4.6–6.2)
RBC # FLD: 12.9 % — SIGNIFICANT CHANGE UP (ref 11–15.6)
SODIUM SERPL-SCNC: 136 MMOL/L — SIGNIFICANT CHANGE UP (ref 135–145)
WBC # BLD: 6.8 K/UL — SIGNIFICANT CHANGE UP (ref 4.8–10.8)
WBC # FLD AUTO: 6.8 K/UL — SIGNIFICANT CHANGE UP (ref 4.8–10.8)

## 2018-10-28 RX ORDER — ACETAMINOPHEN 500 MG
2 TABLET ORAL
Qty: 0 | Refills: 0 | COMMUNITY
Start: 2018-10-28

## 2018-10-28 RX ORDER — IBUPROFEN 200 MG
1 TABLET ORAL
Qty: 0 | Refills: 0 | COMMUNITY
Start: 2018-10-28

## 2018-10-28 RX ORDER — OXYCODONE HYDROCHLORIDE 5 MG/1
1 TABLET ORAL
Qty: 0 | Refills: 0 | COMMUNITY
Start: 2018-10-28

## 2018-10-28 RX ORDER — PANTOPRAZOLE SODIUM 20 MG/1
1 TABLET, DELAYED RELEASE ORAL
Qty: 0 | Refills: 0 | COMMUNITY
Start: 2018-10-28

## 2018-10-28 RX ADMIN — Medication 650 MILLIGRAM(S): at 01:00

## 2018-10-28 RX ADMIN — LOSARTAN POTASSIUM 25 MILLIGRAM(S): 100 TABLET, FILM COATED ORAL at 06:39

## 2018-10-28 RX ADMIN — OXYCODONE HYDROCHLORIDE 10 MILLIGRAM(S): 5 TABLET ORAL at 06:39

## 2018-10-28 RX ADMIN — Medication 650 MILLIGRAM(S): at 11:34

## 2018-10-28 RX ADMIN — ENOXAPARIN SODIUM 40 MILLIGRAM(S): 100 INJECTION SUBCUTANEOUS at 06:39

## 2018-10-28 RX ADMIN — PANTOPRAZOLE SODIUM 40 MILLIGRAM(S): 20 TABLET, DELAYED RELEASE ORAL at 06:39

## 2018-10-28 RX ADMIN — Medication 650 MILLIGRAM(S): at 06:39

## 2018-10-28 RX ADMIN — Medication 650 MILLIGRAM(S): at 00:00

## 2018-10-28 RX ADMIN — ALVIMOPAN 12 MILLIGRAM(S): 12 CAPSULE ORAL at 06:39

## 2018-10-28 RX ADMIN — Medication 650 MILLIGRAM(S): at 12:05

## 2018-10-28 RX ADMIN — Medication 650 MILLIGRAM(S): at 07:25

## 2018-10-28 RX ADMIN — OXYCODONE HYDROCHLORIDE 10 MILLIGRAM(S): 5 TABLET ORAL at 07:25

## 2018-10-28 NOTE — DISCHARGE NOTE ADULT - NS AS ACTIVITY OBS
when ready as determined by patient./Showering allowed/No Heavy lifting/straining/Return to Work/School allowed

## 2018-10-28 NOTE — DISCHARGE NOTE ADULT - HOSPITAL COURSE
53 year old male with history of diverticulitis and colostomy which was placed in June 2018, was admitted on 10/24 for a same day scheduled Healy reversal and diverting ileostomy. Postoperatively, patient presented with a DAVID and leukocytosis. Patient was monitored closely and started on IV antibiotics and hydrated aggressively, showing resolution within 2 days of surgery. Patient had his diet progressed slowly postoperatively to insure that he tolerated without any major issues. Patient seen today to be ready to be discharged with normal creatine and WBC, afebrile, nontachy and tolerating a regular diet.

## 2018-10-28 NOTE — DISCHARGE NOTE ADULT - MEDICATION SUMMARY - MEDICATIONS TO TAKE
I will START or STAY ON the medications listed below when I get home from the hospital:    acetaminophen 325 mg oral tablet  -- 2 tab(s) by mouth every 6 hours  -- Indication: For Acute postoperative abdominal pain    ibuprofen 400 mg oral tablet  -- 1 tab(s) by mouth every 8 hours, As needed, Mild Pain (1 - 3), Moderate Pain (4 - 6)  -- Indication: For Acute postoperative abdominal pain    oxyCODONE 5 mg oral tablet  -- 1 tab(s) by mouth every 4 hours, As needed, Moderate Pain (4 - 6)  -- Indication: For Acute postoperative abdominal pain    losartan 25 mg oral tablet  -- 1 tab(s) by mouth once a day  -- Indication: For Home medication    pantoprazole 40 mg oral delayed release tablet  -- 1 tab(s) by mouth once a day (before a meal)  -- Indication: For Acute postoperative abdominal pain

## 2018-10-28 NOTE — DISCHARGE NOTE ADULT - INSTRUCTIONS
Ostomy bag to be emptied once full of stool contents. Please call the number provided on Monday 10/29 to schedule a follow up appoint for the removal of the abdominal staples. Regular diet, consume plenty of liquids. No heavy lifting (greater than 15lbs) for the next 6 weeks.

## 2018-10-28 NOTE — DISCHARGE NOTE ADULT - ADDITIONAL INSTRUCTIONS
Patient to consume adequate amounts of liquids particularly those with electrolytes (e.g. Gatorade) to avoid dehydration from possible high output ostomy. Ostomy bag to be emptied once full of stool contents. Please call the number provided on Monday 10/29 to schedule a follow up appoint for the removal of the abdominal staples.

## 2018-10-28 NOTE — DISCHARGE NOTE ADULT - CARE PROVIDER_API CALL
Sulaiman Villafana), Surgery; Surgical Critical Care  [address] [place] [id number]  Phone: [phone]  Fax: [phone]

## 2018-10-28 NOTE — DISCHARGE NOTE ADULT - PLAN OF CARE
Improved colostomy output without causing dehydration s/p nelson reversal and diverting ileostomy. Patient to monitor output and insure adequate liquid intake to avoid dehydration.

## 2018-10-28 NOTE — PROGRESS NOTE ADULT - SUBJECTIVE AND OBJECTIVE BOX
HPI:  53 year old male with history of diverticulitis and colostomy which was placed in June 2018, now scheduled for reversal of colostomy. (12 Oct 2018 13:10). He is now POD #1 Ex Lap, DIAMOND, Re's reversal and rectal tear repair.       VERBAL REPORT: "The pump is working well."  Patient has not yet been out of bed. Denies pain at this moment.    PAIN SCORE: 3-4/10   (VNRS)      Allergies  No Known Allergies      THERAPY:  [ ] IV PCA Morphine		[ ] 5 mg/mL	[ ] 1 mg/mL  [X] IV PCA Hydromorphone	[ ] 5 mg/mL	[X] 1 mg/mL  [ ] IV PCA Fentanyl		[ ] 50 micrograms/mL  Demand dose _0.2mg_ lockout _6_ (minutes) Continuous Rate _0_ Total: _3.3mg_  Daily      MEDICATIONS  (STANDING):  acetaminophen   Tablet .. 650 milliGRAM(s) Oral every 6 hours  alvimopan 12 milliGRAM(s) Oral two times a day  enoxaparin Injectable 40 milliGRAM(s) SubCutaneous every 24 hours  HYDROmorphone PCA (1 mG/mL) 30 milliLiter(s) PCA Continuous PCA Continuous  lactated ringers. 1000 milliLiter(s) (125 mL/Hr) IV Continuous <Continuous>  losartan 25 milliGRAM(s) Oral daily  metroNIDAZOLE  IVPB 500 milliGRAM(s) IV Intermittent once    MEDICATIONS  (PRN):  ibuprofen  Tablet. 400 milliGRAM(s) Oral every 8 hours PRN Mild Pain (1 - 3), Moderate Pain (4 - 6)  naloxone Injectable 0.1 milliGRAM(s) IV Push every 3 minutes PRN For ANY of the following changes in patient status:  A. RR LESS THAN 10 breaths per minute, B. Oxygen saturation LESS THAN 90%, C. Sedation score of 6  ondansetron Injectable 4 milliGRAM(s) IV Push every 6 hours PRN Nausea and/or Vomiting      OBJECTIVE:  Sedation Score:	[X] Alert	[ ] Drowsy	[ ] Arousable	[ ] Asleep	[ ] Unresponsive  Side Effects:	[X] None	[ ] Nausea	[ ] Vomiting	[ ] Pruritus	  [ ] Weakness		[ ] Numbness	[ ] Other:      PHYSICAL EXAM:  GENERAL: NAD, sitting up in bed  HEAD:  Atraumatic, Normocephalic  NERVOUS SYSTEM:  Alert & Oriented X3, Good concentration; Motor Strength 5/5 B/L upper and lower extremities  CHEST/LUNG: Clear speech; full sentences  ABDOMEN: Tender at incision      LABS:                        14.8   14.2  )-----------( 312      ( 25 Oct 2018 05:40 )             43.6       10-25    136  |  97<L>  |  23.0<H>  ----------------------------<  162<H>  5.5<H>   |  24.0  |  1.42<H>    Ca    9.1      25 Oct 2018 05:40  Phos  4.0     10-25  Mg     1.4     10-25
INTERVAL HPI/OVERNIGHT EVENTS:  Patient seen this AM at bedside, tolerating his CLD so will be progressed to Regulars. He has had 2 regular BM, nursing noted some old blood in the toilet and was reassured that it would be expected. Noted that if any bright red blood was observed, to contact the ACS team. Ken was dc'd and TOV will be initiated and followed throughout the day. Abdominal dressing was removed and staples were left to air.      MEDICATIONS  (STANDING):  acetaminophen   Tablet .. 650 milliGRAM(s) Oral every 6 hours  alvimopan 12 milliGRAM(s) Oral two times a day  enoxaparin Injectable 40 milliGRAM(s) SubCutaneous every 24 hours  losartan 25 milliGRAM(s) Oral daily  metroNIDAZOLE  IVPB 500 milliGRAM(s) IV Intermittent once    MEDICATIONS  (PRN):  ibuprofen  Tablet. 400 milliGRAM(s) Oral every 8 hours PRN Mild Pain (1 - 3), Moderate Pain (4 - 6)  naloxone Injectable 0.1 milliGRAM(s) IV Push every 3 minutes PRN For ANY of the following changes in patient status:  A. RR LESS THAN 10 breaths per minute, B. Oxygen saturation LESS THAN 90%, C. Sedation score of 6  ondansetron Injectable 4 milliGRAM(s) IV Push every 6 hours PRN Nausea and/or Vomiting  oxyCODONE    IR 5 milliGRAM(s) Oral every 4 hours PRN Moderate Pain (4 - 6)  oxyCODONE    IR 10 milliGRAM(s) Oral every 4 hours PRN Severe Pain (7 - 10)      Vital Signs Last 24 Hrs  T(C): 36.6 (26 Oct 2018 07:59), Max: 37.1 (26 Oct 2018 00:39)  T(F): 97.9 (26 Oct 2018 07:59), Max: 98.8 (26 Oct 2018 00:39)  HR: 81 (26 Oct 2018 07:59) (77 - 105)  BP: 117/81 (26 Oct 2018 07:59) (110/74 - 123/80)  BP(mean): --  RR: 18 (26 Oct 2018 07:59) (18 - 18)  SpO2: 95% (26 Oct 2018 07:59) (93% - 96%)    PE  Gen: No in acute distress  Respiratory: CTAB  CV: RRR, normal S1 and S2  GI: soft, non-distended, midline staples in place, no surrounding erythema nor purulent discharge from the wound  Ext: no pitting edema b/l  Vasc: 2+ radial and PT pulses b/l  Neuro: AAOX3, no neurosensory deficits       I&O's Detail    25 Oct 2018 07:01  -  26 Oct 2018 07:00  --------------------------------------------------------  IN:    IV PiggyBack: 100 mL    lactated ringers.: 625 mL    Oral Fluid: 1100 mL    sodium chloride 0.9%: 2375 mL  Total IN: 4200 mL    OUT:    Ileostomy: 115 mL    Indwelling Catheter - Urethral: 1650 mL    Voided: 900 mL  Total OUT: 2665 mL    Total NET: 1535 mL      26 Oct 2018 07:01  -  26 Oct 2018 12:07  --------------------------------------------------------  IN:  Total IN: 0 mL    OUT:    Indwelling Catheter - Urethral: 600 mL  Total OUT: 600 mL    Total NET: -600 mL          LABS:                        11.7   14.6  )-----------( 304      ( 26 Oct 2018 07:18 )             36.5     10-26    136  |  99  |  22.0<H>  ----------------------------<  112  5.0   |  29.0  |  1.12    Ca    8.5<L>      26 Oct 2018 07:18  Phos  3.3     10-26  Mg     2.4     10-26            RADIOLOGY & ADDITIONAL STUDIES:
INTERVAL HPI/OVERNIGHT EVENTS:  Patient was seen and examined at bedside this AM.  No acute events overnight. Denies abdominal pain, nausea, vomiting, fever, and chills. Tolerated clear liquids.    STATUS POST:  Diverting ileostomy, nelson reversal    POST OPERATIVE DAY #: 1      MEDICATIONS  (STANDING):  acetaminophen   Tablet .. 650 milliGRAM(s) Oral every 6 hours  alvimopan 12 milliGRAM(s) Oral two times a day  enoxaparin Injectable 40 milliGRAM(s) SubCutaneous every 24 hours  HYDROmorphone PCA (1 mG/mL) 30 milliLiter(s) PCA Continuous PCA Continuous  lactated ringers. 1000 milliLiter(s) (125 mL/Hr) IV Continuous <Continuous>  losartan 25 milliGRAM(s) Oral daily  metroNIDAZOLE  IVPB 500 milliGRAM(s) IV Intermittent once    MEDICATIONS  (PRN):  ibuprofen  Tablet. 400 milliGRAM(s) Oral every 8 hours PRN Mild Pain (1 - 3), Moderate Pain (4 - 6)  naloxone Injectable 0.1 milliGRAM(s) IV Push every 3 minutes PRN For ANY of the following changes in patient status:  A. RR LESS THAN 10 breaths per minute, B. Oxygen saturation LESS THAN 90%, C. Sedation score of 6  ondansetron Injectable 4 milliGRAM(s) IV Push every 6 hours PRN Nausea and/or Vomiting      Vital Signs Last 24 Hrs  T(C): 37.1 (25 Oct 2018 07:30), Max: 37.1 (25 Oct 2018 07:30)  T(F): 98.8 (25 Oct 2018 07:30), Max: 98.8 (25 Oct 2018 07:30)  HR: 85 (25 Oct 2018 07:30) (85 - 96)  BP: 122/81 (25 Oct 2018 07:30) (95/70 - 134/83)  BP(mean): --  RR: 17 (25 Oct 2018 07:30) (11 - 18)  SpO2: 94% (25 Oct 2018 07:30) (92% - 99%)    Physical Exam:  Gen: NAD  Neurological:  No sensory/motor deficits  HEENT: PERRLA, EOMI  Respiratory: Breath Sounds equal & CTA bilaterally, no accessory muscle use  Cardiovascular: Regular rate & rhythm, normal S1, S2; no murmurs, gallops or rubs  Gastrointestinal: Soft, non-tender, nondistended. Ostomy site is pink, some liquid content. Midline incision dressing is clean, dry, and intact.  Vascular: Equal and normal pulses: 2+ peripheral pulses throughout  Musculoskeletal: No joint pain, swelling or deformity; no limitation of movement  Skin: No rashes      I&O's Detail    24 Oct 2018 07:01  -  25 Oct 2018 07:00  --------------------------------------------------------  IN:    Lactated Ringers IV Bolus: 1000 mL    lactated ringers.: 1125 mL  Total IN: 2125 mL    OUT:    Ileostomy: 10 mL    Indwelling Catheter - Urethral: 500 mL  Total OUT: 510 mL    Total NET: 1615 mL          LABS:                        14.8   14.2  )-----------( 312      ( 25 Oct 2018 05:40 )             43.6     10-25    134<L>  |  96<L>  |  24.0<H>  ----------------------------<  152<H>  5.1   |  25.0  |  1.32<H>    Ca    8.9      25 Oct 2018 10:56  Phos  4.0     10-25  Mg     1.4     10-25            RADIOLOGY & ADDITIONAL STUDIES:
INTERVAL HPI/OVERNIGHT EVENTS:  Patient was seen and examined at bedside this AM.  No acute events overnight. Reports some incisional site pain. Denies nausea, vomiting, fever, and chills. Is tolerating diet.     STATUS POST:  Re's reversal and diverting ileostomy     POST OPERATIVE DAY #: 3      MEDICATIONS  (STANDING):  acetaminophen   Tablet .. 650 milliGRAM(s) Oral every 6 hours  alvimopan 12 milliGRAM(s) Oral two times a day  enoxaparin Injectable 40 milliGRAM(s) SubCutaneous every 24 hours  losartan 25 milliGRAM(s) Oral daily  metroNIDAZOLE  IVPB 500 milliGRAM(s) IV Intermittent once  pantoprazole    Tablet 40 milliGRAM(s) Oral before breakfast    MEDICATIONS  (PRN):  ibuprofen  Tablet. 400 milliGRAM(s) Oral every 8 hours PRN Mild Pain (1 - 3), Moderate Pain (4 - 6)  naloxone Injectable 0.1 milliGRAM(s) IV Push every 3 minutes PRN For ANY of the following changes in patient status:  A. RR LESS THAN 10 breaths per minute, B. Oxygen saturation LESS THAN 90%, C. Sedation score of 6  ondansetron Injectable 4 milliGRAM(s) IV Push every 6 hours PRN Nausea and/or Vomiting  oxyCODONE    IR 5 milliGRAM(s) Oral every 4 hours PRN Moderate Pain (4 - 6)  oxyCODONE    IR 10 milliGRAM(s) Oral every 4 hours PRN Severe Pain (7 - 10)      Vital Signs Last 24 Hrs  T(C): 36.9 (27 Oct 2018 05:19), Max: 37.2 (26 Oct 2018 20:17)  T(F): 98.4 (27 Oct 2018 05:19), Max: 99 (26 Oct 2018 20:17)  HR: 84 (27 Oct 2018 05:19) (81 - 93)  BP: 128/86 (27 Oct 2018 05:19) (117/81 - 133/88)  BP(mean): --  RR: 18 (27 Oct 2018 05:19) (18 - 19)  SpO2: 96% (27 Oct 2018 05:19) (93% - 96%)    Physical Exam:  Gen: No in acute distress  Respiratory: CTAB  CV: RRR, normal S1 and S2  GI: soft, non-distended, midline staples in place, no surrounding erythema nor purulent discharge from the wound. Ostomy is pink and healthy; brown output present in bag  Ext: no pitting edema b/l  Vasc: 2+ radial and PT pulses b/l  Neuro: AAOX3, no neurosensory deficits    I&O's Detail    26 Oct 2018 07:01  -  27 Oct 2018 07:00  --------------------------------------------------------  IN:    Oral Fluid: 740 mL  Total IN: 740 mL    OUT:    Ileostomy: 1075 mL    Indwelling Catheter - Urethral: 600 mL    Voided: 1500 mL  Total OUT: 3175 mL    Total NET: -2435 mL          LABS:                        12.1   9.2   )-----------( 274      ( 27 Oct 2018 06:46 )             36.7     10-26    136  |  99  |  22.0<H>  ----------------------------<  112  5.0   |  29.0  |  1.12    Ca    8.5<L>      26 Oct 2018 07:18  Phos  3.3     10-26  Mg     2.4     10-26            RADIOLOGY & ADDITIONAL STUDIES:
INTERVAL HPI/OVERNIGHT EVENTS: No acute events o/n. Patient tolerating diet. Pain well controlled.    SUBJECTIVE: Feeling well this AM. No fevers/chills, no N/V.       MEDICATIONS  (STANDING):  acetaminophen   Tablet .. 650 milliGRAM(s) Oral every 6 hours  alvimopan 12 milliGRAM(s) Oral two times a day  enoxaparin Injectable 40 milliGRAM(s) SubCutaneous every 24 hours  losartan 25 milliGRAM(s) Oral daily  metroNIDAZOLE  IVPB 500 milliGRAM(s) IV Intermittent once  pantoprazole    Tablet 40 milliGRAM(s) Oral before breakfast    MEDICATIONS  (PRN):  ibuprofen  Tablet. 400 milliGRAM(s) Oral every 8 hours PRN Mild Pain (1 - 3), Moderate Pain (4 - 6)  naloxone Injectable 0.1 milliGRAM(s) IV Push every 3 minutes PRN For ANY of the following changes in patient status:  A. RR LESS THAN 10 breaths per minute, B. Oxygen saturation LESS THAN 90%, C. Sedation score of 6  ondansetron Injectable 4 milliGRAM(s) IV Push every 6 hours PRN Nausea and/or Vomiting  oxyCODONE    IR 5 milliGRAM(s) Oral every 4 hours PRN Moderate Pain (4 - 6)  oxyCODONE    IR 10 milliGRAM(s) Oral every 4 hours PRN Severe Pain (7 - 10)      Vital Signs Last 24 Hrs  T(C): 36.7 (28 Oct 2018 07:16), Max: 37.3 (28 Oct 2018 00:54)  T(F): 98.1 (28 Oct 2018 07:16), Max: 99.2 (28 Oct 2018 00:54)  HR: 77 (28 Oct 2018 07:16) (77 - 94)  BP: 112/77 (28 Oct 2018 07:16) (112/77 - 133/86)  BP(mean): --  RR: 18 (28 Oct 2018 07:16) (18 - 18)  SpO2: 92% (28 Oct 2018 07:16) (92% - 98%)    Physical Exam:  Gen: No in acute distress  Respiratory: CTAB  CV: RRR, normal S1 and S2  GI: soft, non-distended, midline staples in place, no surrounding erythema nor purulent discharge from the wound. Ostomy is pink and healthy; brown output present in bag  Ext: no pitting edema b/l  Vasc: 2+ radial and PT pulses b/l  Neuro: AAOX3, no neurosensory deficits      I&O's Detail    27 Oct 2018 07:01  -  28 Oct 2018 07:00  --------------------------------------------------------  IN:    Oral Fluid: 540 mL  Total IN: 540 mL    OUT:    Ileostomy: 100 mL  Total OUT: 100 mL    Total NET: 440 mL          LABS:                        12.5   6.8   )-----------( 313      ( 28 Oct 2018 06:42 )             36.7     10-28    136  |  96<L>  |  18.0  ----------------------------<  117<H>  4.2   |  26.0  |  0.94    Ca    9.1      28 Oct 2018 06:42  Phos  4.2     10-28  Mg     1.8     10-28            RADIOLOGY & ADDITIONAL STUDIES:

## 2018-10-28 NOTE — PROGRESS NOTE ADULT - ASSESSMENT
53 year old male, now POD #1 Ex Lap, DIAMOND, Re's reversal and rectal tear repair. Found A&Ox3, NAD, sitting up in bed using incentive spirometry.      PLAN  Therapy to  be:	[X] Continue   [ ] Discontinued   [ ] Change to prn Analgesics    Documentation and Verification of current medications:  [X] Done	[ ] Not done, not eligible  [ ] Not done, reason not given
53 year old male s/p michael's reversal w/ rectal tear and diverting ileostomy on 10/24 recovering, tolerating regular diet     Plan:  -Regular diet  -pain management  -I/S  -encourage ambulation
53 year old male s/p michael's reversal w/ rectal tear and diverting ileostomy on 10/24 recovering, tolerating regular diet     Plan:  -continue  -OOB, encourage ambulation  -IS  -Likely discharge 10/29
54yo male s/p Ex Lap, repair of rectal tear, Re's reversal, resection of rectal stump, ileostomy placement 2/2 to hx of diverticulitis and Hartmans's procedure  -Hemodynamically stable  -Oliguric overnight; was given 1L bolus and responded appropriately     Plan:  -Pain Control Prn  -DVT PPx  -Clear Liquid diet  -Rogers Catheter  -Monitor I/O's  -Encourage OOB, ambulation, and Incentive Spirometer
A/P: 53 year old man s/p nelson reversal w/ rectal tear and diverting ileostomy on 10/24 recovering well being started on a regular diet and having his almodovar discontinued.    -Regular diet  -d/c almodovar   -TOV  -pain management  -I/S  -encourage ambulation

## 2018-10-28 NOTE — DISCHARGE NOTE ADULT - PATIENT PORTAL LINK FT
You can access the [facility] Patient Portal, offered by [facility], by registering with the following website: [email or web address]

## 2018-10-28 NOTE — DISCHARGE NOTE ADULT - CARE PLAN
Principal Discharge DX:	Colostomy status  Goal:	Improved colostomy output without causing dehydration  Assessment and plan of treatment:	s/p nelson reversal and diverting ileostomy. Patient to monitor output and insure adequate liquid intake to avoid dehydration.

## 2018-10-28 NOTE — PROGRESS NOTE ADULT - ATTENDING COMMENTS
Patient seen and examined.  no new issues, tolerating diet, Ileostomy functioning with adequate output (less than 300 in 12 hrs)  discussed hydration and close follow uo of ileostomy to prevent dehydration.  Ok to be d/c today home, follow up next week in office.

## 2018-10-29 PROBLEM — K57.92 DIVERTICULITIS OF INTESTINE, PART UNSPECIFIED, WITHOUT PERFORATION OR ABSCESS WITHOUT BLEEDING: Chronic | Status: ACTIVE | Noted: 2018-10-12

## 2018-10-30 LAB — SURGICAL PATHOLOGY FINAL REPORT - CH: SIGNIFICANT CHANGE UP

## 2018-11-01 ENCOUNTER — APPOINTMENT (OUTPATIENT)
Dept: TRAUMA SURGERY | Facility: CLINIC | Age: 53
End: 2018-11-01
Payer: MEDICARE

## 2018-11-01 VITALS
TEMPERATURE: 98 F | SYSTOLIC BLOOD PRESSURE: 122 MMHG | HEIGHT: 69 IN | HEART RATE: 95 BPM | DIASTOLIC BLOOD PRESSURE: 86 MMHG | BODY MASS INDEX: 29.62 KG/M2 | OXYGEN SATURATION: 95 % | WEIGHT: 200 LBS

## 2018-11-01 PROCEDURE — 99024 POSTOP FOLLOW-UP VISIT: CPT

## 2018-11-08 ENCOUNTER — APPOINTMENT (OUTPATIENT)
Dept: TRAUMA SURGERY | Facility: CLINIC | Age: 53
End: 2018-11-08
Payer: MEDICARE

## 2018-11-08 VITALS
BODY MASS INDEX: 30.07 KG/M2 | HEIGHT: 69 IN | TEMPERATURE: 97.6 F | WEIGHT: 203 LBS | HEART RATE: 84 BPM | SYSTOLIC BLOOD PRESSURE: 124 MMHG | OXYGEN SATURATION: 96 % | DIASTOLIC BLOOD PRESSURE: 86 MMHG

## 2018-11-08 PROCEDURE — 99024 POSTOP FOLLOW-UP VISIT: CPT

## 2018-11-11 PROCEDURE — 88304 TISSUE EXAM BY PATHOLOGIST: CPT

## 2018-11-11 PROCEDURE — 85027 COMPLETE CBC AUTOMATED: CPT

## 2018-11-11 PROCEDURE — 83735 ASSAY OF MAGNESIUM: CPT

## 2018-11-11 PROCEDURE — 93005 ELECTROCARDIOGRAM TRACING: CPT

## 2018-11-11 PROCEDURE — 84100 ASSAY OF PHOSPHORUS: CPT

## 2018-11-11 PROCEDURE — 36415 COLL VENOUS BLD VENIPUNCTURE: CPT

## 2018-11-11 PROCEDURE — 88302 TISSUE EXAM BY PATHOLOGIST: CPT

## 2018-11-11 PROCEDURE — 88307 TISSUE EXAM BY PATHOLOGIST: CPT

## 2018-11-11 PROCEDURE — 82962 GLUCOSE BLOOD TEST: CPT

## 2018-11-11 PROCEDURE — 80048 BASIC METABOLIC PNL TOTAL CA: CPT

## 2018-11-29 ENCOUNTER — APPOINTMENT (OUTPATIENT)
Dept: TRAUMA SURGERY | Facility: CLINIC | Age: 53
End: 2018-11-29
Payer: MEDICARE

## 2018-11-29 VITALS
BODY MASS INDEX: 30.81 KG/M2 | HEART RATE: 80 BPM | OXYGEN SATURATION: 95 % | TEMPERATURE: 97.7 F | HEIGHT: 69 IN | DIASTOLIC BLOOD PRESSURE: 81 MMHG | WEIGHT: 208 LBS | SYSTOLIC BLOOD PRESSURE: 116 MMHG

## 2018-11-29 PROCEDURE — 99024 POSTOP FOLLOW-UP VISIT: CPT

## 2018-12-06 ENCOUNTER — OUTPATIENT (OUTPATIENT)
Dept: OUTPATIENT SERVICES | Facility: HOSPITAL | Age: 53
LOS: 1 days | End: 2018-12-06
Payer: MEDICARE

## 2018-12-06 DIAGNOSIS — Z98.890 OTHER SPECIFIED POSTPROCEDURAL STATES: Chronic | ICD-10-CM

## 2018-12-06 DIAGNOSIS — Z93.3 COLOSTOMY STATUS: Chronic | ICD-10-CM

## 2018-12-06 DIAGNOSIS — Z93.3 COLOSTOMY STATUS: ICD-10-CM

## 2018-12-06 PROCEDURE — 74270 X-RAY XM COLON 1CNTRST STD: CPT | Mod: 26

## 2018-12-06 PROCEDURE — 74270 X-RAY XM COLON 1CNTRST STD: CPT

## 2018-12-17 ENCOUNTER — OUTPATIENT (OUTPATIENT)
Dept: OUTPATIENT SERVICES | Facility: HOSPITAL | Age: 53
LOS: 1 days | End: 2018-12-17
Payer: MEDICARE

## 2018-12-17 ENCOUNTER — TRANSCRIPTION ENCOUNTER (OUTPATIENT)
Age: 53
End: 2018-12-17

## 2018-12-17 VITALS
SYSTOLIC BLOOD PRESSURE: 117 MMHG | RESPIRATION RATE: 16 BRPM | WEIGHT: 147.71 LBS | HEART RATE: 87 BPM | HEIGHT: 67 IN | TEMPERATURE: 98 F | DIASTOLIC BLOOD PRESSURE: 86 MMHG

## 2018-12-17 DIAGNOSIS — Z93.2 ILEOSTOMY STATUS: Chronic | ICD-10-CM

## 2018-12-17 DIAGNOSIS — Z01.818 ENCOUNTER FOR OTHER PREPROCEDURAL EXAMINATION: ICD-10-CM

## 2018-12-17 DIAGNOSIS — Z98.890 OTHER SPECIFIED POSTPROCEDURAL STATES: Chronic | ICD-10-CM

## 2018-12-17 DIAGNOSIS — I10 ESSENTIAL (PRIMARY) HYPERTENSION: ICD-10-CM

## 2018-12-17 DIAGNOSIS — Z93.3 COLOSTOMY STATUS: Chronic | ICD-10-CM

## 2018-12-17 DIAGNOSIS — Z29.9 ENCOUNTER FOR PROPHYLACTIC MEASURES, UNSPECIFIED: ICD-10-CM

## 2018-12-17 DIAGNOSIS — K57.80 DIVERTICULITIS OF INTESTINE, PART UNSPECIFIED, WITH PERFORATION AND ABSCESS WITHOUT BLEEDING: ICD-10-CM

## 2018-12-17 LAB
ANION GAP SERPL CALC-SCNC: 15 MMOL/L — SIGNIFICANT CHANGE UP (ref 5–17)
APTT BLD: 26.1 SEC — LOW (ref 27.5–36.3)
BASOPHILS # BLD AUTO: 0.1 K/UL — SIGNIFICANT CHANGE UP (ref 0–0.2)
BASOPHILS NFR BLD AUTO: 0.9 % — SIGNIFICANT CHANGE UP (ref 0–2)
BLD GP AB SCN SERPL QL: SIGNIFICANT CHANGE UP
BUN SERPL-MCNC: 20 MG/DL — SIGNIFICANT CHANGE UP (ref 8–20)
CALCIUM SERPL-MCNC: 9.4 MG/DL — SIGNIFICANT CHANGE UP (ref 8.6–10.2)
CHLORIDE SERPL-SCNC: 101 MMOL/L — SIGNIFICANT CHANGE UP (ref 98–107)
CO2 SERPL-SCNC: 19 MMOL/L — LOW (ref 22–29)
CREAT SERPL-MCNC: 1.4 MG/DL — HIGH (ref 0.5–1.3)
EOSINOPHIL # BLD AUTO: 0.3 K/UL — SIGNIFICANT CHANGE UP (ref 0–0.5)
EOSINOPHIL NFR BLD AUTO: 3.5 % — SIGNIFICANT CHANGE UP (ref 0–5)
GLUCOSE SERPL-MCNC: 105 MG/DL — SIGNIFICANT CHANGE UP (ref 70–115)
HCT VFR BLD CALC: 45.3 % — SIGNIFICANT CHANGE UP (ref 42–52)
HGB BLD-MCNC: 15.6 G/DL — SIGNIFICANT CHANGE UP (ref 14–18)
INR BLD: 0.92 RATIO — SIGNIFICANT CHANGE UP (ref 0.88–1.16)
LYMPHOCYTES # BLD AUTO: 1.3 K/UL — SIGNIFICANT CHANGE UP (ref 1–4.8)
LYMPHOCYTES # BLD AUTO: 17.1 % — LOW (ref 20–55)
MCHC RBC-ENTMCNC: 29.3 PG — SIGNIFICANT CHANGE UP (ref 27–31)
MCHC RBC-ENTMCNC: 34.4 G/DL — SIGNIFICANT CHANGE UP (ref 32–36)
MCV RBC AUTO: 85.2 FL — SIGNIFICANT CHANGE UP (ref 80–94)
MONOCYTES # BLD AUTO: 0.8 K/UL — SIGNIFICANT CHANGE UP (ref 0–0.8)
MONOCYTES NFR BLD AUTO: 10.3 % — HIGH (ref 3–10)
NEUTROPHILS # BLD AUTO: 5 K/UL — SIGNIFICANT CHANGE UP (ref 1.8–8)
NEUTROPHILS NFR BLD AUTO: 66.9 % — SIGNIFICANT CHANGE UP (ref 37–73)
PLATELET # BLD AUTO: 325 K/UL — SIGNIFICANT CHANGE UP (ref 150–400)
POTASSIUM SERPL-MCNC: 4.5 MMOL/L — SIGNIFICANT CHANGE UP (ref 3.5–5.3)
POTASSIUM SERPL-SCNC: 4.5 MMOL/L — SIGNIFICANT CHANGE UP (ref 3.5–5.3)
PROTHROM AB SERPL-ACNC: 10.6 SEC — SIGNIFICANT CHANGE UP (ref 10–12.9)
RBC # BLD: 5.32 M/UL — SIGNIFICANT CHANGE UP (ref 4.6–6.2)
RBC # FLD: 13.3 % — SIGNIFICANT CHANGE UP (ref 11–15.6)
SODIUM SERPL-SCNC: 135 MMOL/L — SIGNIFICANT CHANGE UP (ref 135–145)
TYPE + AB SCN PNL BLD: SIGNIFICANT CHANGE UP
WBC # BLD: 7.5 K/UL — SIGNIFICANT CHANGE UP (ref 4.8–10.8)
WBC # FLD AUTO: 7.5 K/UL — SIGNIFICANT CHANGE UP (ref 4.8–10.8)

## 2018-12-17 PROCEDURE — 85610 PROTHROMBIN TIME: CPT

## 2018-12-17 PROCEDURE — 86850 RBC ANTIBODY SCREEN: CPT

## 2018-12-17 PROCEDURE — 86900 BLOOD TYPING SEROLOGIC ABO: CPT

## 2018-12-17 PROCEDURE — 86901 BLOOD TYPING SEROLOGIC RH(D): CPT

## 2018-12-17 PROCEDURE — G0463: CPT

## 2018-12-17 PROCEDURE — 36415 COLL VENOUS BLD VENIPUNCTURE: CPT

## 2018-12-17 PROCEDURE — 93010 ELECTROCARDIOGRAM REPORT: CPT

## 2018-12-17 PROCEDURE — 85027 COMPLETE CBC AUTOMATED: CPT

## 2018-12-17 PROCEDURE — 85730 THROMBOPLASTIN TIME PARTIAL: CPT

## 2018-12-17 PROCEDURE — 80048 BASIC METABOLIC PNL TOTAL CA: CPT

## 2018-12-17 PROCEDURE — 93005 ELECTROCARDIOGRAM TRACING: CPT

## 2018-12-17 NOTE — PATIENT PROFILE ADULT - NSPROEDALEARNPREF_GEN_A_NUR
verbal instruction/written material/presurgical instructions, pain management education given - verbalized understanding

## 2018-12-17 NOTE — H&P PST ADULT - PMH
Diverticulitis    Essential hypertension    Herniated cervical disc  with myelopathy into hands Diverticulitis    Essential hypertension    Gout    Herniated cervical disc  with myelopathy into hands  Hyperlipidemia

## 2018-12-17 NOTE — H&P PST ADULT - HISTORY OF PRESENT ILLNESS
52 y/o male seen today for pre-op flex sigmoidoscopy on 12/18/18 and ileostomy reversal on 12/31/18. Pt accompanied to this visit by his spouse. NAD,

## 2018-12-17 NOTE — H&P PST ADULT - NSANTHOSAYNRD_GEN_A_CORE
No. JAAMR screening performed.  STOP BANG Legend: 0-2 = LOW Risk; 3-4 = INTERMEDIATE Risk; 5-8 = HIGH Risk

## 2018-12-17 NOTE — H&P PST ADULT - ASSESSMENT
52 y/o male seen today for pre-op flex sigmoidoscopy on 18 and ileostomy reversal on 18. Surgery protocol reviewed with pt today. Pt to follow-up for clearance for 18 surgery.   CAPRINI SCORE [CLOT]    AGE RELATED RISK FACTORS                                                       MOBILITY RELATED FACTORS  [ x] Age 41-60 years                                            (1 Point)                  [ ] Bed rest                                                        (1 Point)  [ ] Age: 61-74 years                                           (2 Points)                 [ ] Plaster cast                                                   (2 Points)  [ ] Age= 75 years                                              (3 Points)                 [ ] Bed bound for more than 72 hours                 (2 Points)    DISEASE RELATED RISK FACTORS                                               GENDER SPECIFIC FACTORS  [ ] Edema in the lower extremities                       (1 Point)                  [ ] Pregnancy                                                     (1 Point)  [ ] Varicose veins                                               (1 Point)                  [ ] Post-partum < 6 weeks                                   (1 Point)             [x ] BMI > 25 Kg/m2                                            (1 Point)                  [ ] Hormonal therapy  or oral contraception          (1 Point)                 [ ] Sepsis (in the previous month)                        (1 Point)                  [ ] History of pregnancy complications                 (1 point)  [ ] Pneumonia or serious lung disease                                               [ ] Unexplained or recurrent                     (1 Point)           (in the previous month)                               (1 Point)  [ ] Abnormal pulmonary function test                     (1 Point)                 SURGERY RELATED RISK FACTORS  [ ] Acute myocardial infarction                              (1 Point)                 [ ]  Section                                             (1 Point)  [ ] Congestive heart failure (in the previous month)  (1 Point)               [ ] Minor surgery                                                  (1 Point)   [ ] Inflammatory bowel disease                             (1 Point)                 [ ] Arthroscopic surgery                                        (2 Points)  [ ] Central venous access                                      (2 Points)                [x ] General surgery lasting more than 45 minutes   (2 Points)       [ ] Stroke (in the previous month)                          (5 Points)               [ ] Elective arthroplasty                                         (5 Points)                                                                                                                                               HEMATOLOGY RELATED FACTORS                                                 TRAUMA RELATED RISK FACTORS  [ ] Prior episodes of VTE                                     (3 Points)                [ ] Fracture of the hip, pelvis, or leg                       (5 Points)  [ ] Positive family history for VTE                         (3 Points)                 [ ] Acute spinal cord injury (in the previous month)  (5 Points)  [ ] Prothrombin 95724 A                                     (3 Points)                 [ ] Paralysis  (less than 1 month)                             (5 Points)  [ ] Factor V Leiden                                             (3 Points)                  [ ] Multiple Trauma within 1 month                        (5 Points)  [ ] Lupus anticoagulants                                     (3 Points)                                                           [ ] Anticardiolipin antibodies                               (3 Points)                                                       [ ] High homocysteine in the blood                      (3 Points)                                             [ ] Other congenital or acquired thrombophilia      (3 Points)                                                [ ] Heparin induced thrombocytopenia                  (3 Points)                                          Total Score [   4       ]  OPIOID RISK TOOL    GERTRUDIS EACH BOX THAT APPLIES AND ADD TOTALS AT THE END    FAMILY HISTORY OF SUBSTANCE ABUSE                 FEMALE         MALE                                                Alcohol                             [  ]1 pt          [  ]3pts                                               Illegal Durgs                     [  ]2 pts        [  ]3pts                                               Rx Drugs                           [  ]4 pts        [  ]4 pts    PERSONAL HISTORY OF SUBSTANCE ABUSE                                                                                          Alcohol                             [  ]3 pts       [  ]3 pts                                               Illegal Drugs                     [  ]4 pts        [  ]4 pts                                               Rx Drugs                           [  ]5 pts        [  ]5 pts    AGE BETWEEN 16-45 YEARS                                      [  ]1 pt         [  ]1 pt    HISTORY OF PREADOLESCENT   SEXUAL ABUSE                                                             [  ]3 pts        [  ]0pts    PSYCHOLOGICAL DISEASE                     ADD, OCD, Bipolar, Schizophrenia        [  ]2 pts         [  ]2 pts                      Depression                                               [  ]1 pt           [  ]1 pt           SCORING TOTAL   (add numbers and type here)              (*0**)                                     A score of 3 or lower indicated LOW risk for future opioid abuse  A score of 4 to 7 indicated moderate risk for future opioid abuse  A score of 8 or higher indicates a high risk for opioid abuse

## 2018-12-17 NOTE — H&P PST ADULT - FAMILY HISTORY
Mother  Still living? No  FH: diabetes mellitus, Age at diagnosis: Age Unknown  Family history of hypertension, Age at diagnosis: Age Unknown     Father  Still living? No  Family history of emphysema, Age at diagnosis: Age Unknown

## 2018-12-18 ENCOUNTER — OUTPATIENT (OUTPATIENT)
Dept: OUTPATIENT SERVICES | Facility: HOSPITAL | Age: 53
LOS: 1 days | End: 2018-12-18
Payer: MEDICARE

## 2018-12-18 DIAGNOSIS — Z93.2 ILEOSTOMY STATUS: Chronic | ICD-10-CM

## 2018-12-18 DIAGNOSIS — Z93.3 COLOSTOMY STATUS: Chronic | ICD-10-CM

## 2018-12-18 DIAGNOSIS — K57.80 DIVERTICULITIS OF INTESTINE, PART UNSPECIFIED, WITH PERFORATION AND ABSCESS WITHOUT BLEEDING: ICD-10-CM

## 2018-12-18 DIAGNOSIS — Z98.890 OTHER SPECIFIED POSTPROCEDURAL STATES: Chronic | ICD-10-CM

## 2018-12-18 PROCEDURE — 45330 DIAGNOSTIC SIGMOIDOSCOPY: CPT | Mod: 58

## 2018-12-18 PROCEDURE — 45330 DIAGNOSTIC SIGMOIDOSCOPY: CPT

## 2018-12-30 ENCOUNTER — TRANSCRIPTION ENCOUNTER (OUTPATIENT)
Age: 53
End: 2018-12-30

## 2018-12-31 ENCOUNTER — RESULT REVIEW (OUTPATIENT)
Age: 53
End: 2018-12-31

## 2018-12-31 ENCOUNTER — INPATIENT (INPATIENT)
Facility: HOSPITAL | Age: 53
LOS: 1 days | Discharge: HOME | DRG: 330 | End: 2019-01-02
Attending: SURGERY | Admitting: SURGERY
Payer: MEDICARE

## 2018-12-31 VITALS
OXYGEN SATURATION: 97 % | HEART RATE: 80 BPM | HEIGHT: 67 IN | WEIGHT: 147.71 LBS | SYSTOLIC BLOOD PRESSURE: 117 MMHG | DIASTOLIC BLOOD PRESSURE: 80 MMHG | TEMPERATURE: 99 F | RESPIRATION RATE: 16 BRPM

## 2018-12-31 DIAGNOSIS — K57.80 DIVERTICULITIS OF INTESTINE, PART UNSPECIFIED, WITH PERFORATION AND ABSCESS WITHOUT BLEEDING: ICD-10-CM

## 2018-12-31 DIAGNOSIS — Z93.2 ILEOSTOMY STATUS: Chronic | ICD-10-CM

## 2018-12-31 DIAGNOSIS — Z98.890 OTHER SPECIFIED POSTPROCEDURAL STATES: Chronic | ICD-10-CM

## 2018-12-31 DIAGNOSIS — Z93.3 COLOSTOMY STATUS: Chronic | ICD-10-CM

## 2018-12-31 LAB
APTT BLD: 28.2 SEC — SIGNIFICANT CHANGE UP (ref 27.5–36.3)
INR BLD: 0.96 RATIO — SIGNIFICANT CHANGE UP (ref 0.88–1.16)
PROTHROM AB SERPL-ACNC: 11 SEC — SIGNIFICANT CHANGE UP (ref 10–12.9)

## 2018-12-31 PROCEDURE — 44125 REMOVAL OF SMALL INTESTINE: CPT | Mod: 58

## 2018-12-31 PROCEDURE — 88304 TISSUE EXAM BY PATHOLOGIST: CPT | Mod: 26

## 2018-12-31 RX ORDER — SODIUM CHLORIDE 9 MG/ML
1000 INJECTION, SOLUTION INTRAVENOUS
Qty: 0 | Refills: 0 | Status: DISCONTINUED | OUTPATIENT
Start: 2018-12-31 | End: 2019-01-01

## 2018-12-31 RX ORDER — ACETAMINOPHEN 500 MG
650 TABLET ORAL EVERY 6 HOURS
Qty: 0 | Refills: 0 | Status: DISCONTINUED | OUTPATIENT
Start: 2018-12-31 | End: 2019-01-02

## 2018-12-31 RX ORDER — ONDANSETRON 8 MG/1
4 TABLET, FILM COATED ORAL ONCE
Qty: 0 | Refills: 0 | Status: DISCONTINUED | OUTPATIENT
Start: 2018-12-31 | End: 2018-12-31

## 2018-12-31 RX ORDER — FENTANYL CITRATE 50 UG/ML
50 INJECTION INTRAVENOUS
Qty: 0 | Refills: 0 | Status: DISCONTINUED | OUTPATIENT
Start: 2018-12-31 | End: 2018-12-31

## 2018-12-31 RX ORDER — ATORVASTATIN CALCIUM 80 MG/1
1 TABLET, FILM COATED ORAL
Qty: 0 | Refills: 0 | COMMUNITY

## 2018-12-31 RX ORDER — ATORVASTATIN CALCIUM 80 MG/1
10 TABLET, FILM COATED ORAL AT BEDTIME
Qty: 0 | Refills: 0 | Status: DISCONTINUED | OUTPATIENT
Start: 2018-12-31 | End: 2019-01-02

## 2018-12-31 RX ORDER — OXYCODONE HYDROCHLORIDE 5 MG/1
5 TABLET ORAL EVERY 4 HOURS
Qty: 0 | Refills: 0 | Status: DISCONTINUED | OUTPATIENT
Start: 2018-12-31 | End: 2019-01-02

## 2018-12-31 RX ORDER — ONDANSETRON 8 MG/1
4 TABLET, FILM COATED ORAL EVERY 6 HOURS
Qty: 0 | Refills: 0 | Status: DISCONTINUED | OUTPATIENT
Start: 2018-12-31 | End: 2019-01-02

## 2018-12-31 RX ORDER — CEFOTETAN DISODIUM 1 G
2 VIAL (EA) INJECTION ONCE
Qty: 0 | Refills: 0 | Status: COMPLETED | OUTPATIENT
Start: 2018-12-31 | End: 2018-12-31

## 2018-12-31 RX ORDER — SODIUM CHLORIDE 9 MG/ML
1000 INJECTION, SOLUTION INTRAVENOUS
Qty: 0 | Refills: 0 | Status: DISCONTINUED | OUTPATIENT
Start: 2018-12-31 | End: 2018-12-31

## 2018-12-31 RX ORDER — SODIUM CHLORIDE 9 MG/ML
3 INJECTION INTRAMUSCULAR; INTRAVENOUS; SUBCUTANEOUS EVERY 8 HOURS
Qty: 0 | Refills: 0 | Status: DISCONTINUED | OUTPATIENT
Start: 2018-12-31 | End: 2018-12-31

## 2018-12-31 RX ORDER — LOSARTAN POTASSIUM 100 MG/1
25 TABLET, FILM COATED ORAL DAILY
Qty: 0 | Refills: 0 | Status: DISCONTINUED | OUTPATIENT
Start: 2018-12-31 | End: 2019-01-02

## 2018-12-31 RX ORDER — OXYCODONE HYDROCHLORIDE 5 MG/1
10 TABLET ORAL EVERY 4 HOURS
Qty: 0 | Refills: 0 | Status: DISCONTINUED | OUTPATIENT
Start: 2018-12-31 | End: 2019-01-02

## 2018-12-31 RX ORDER — HEPARIN SODIUM 5000 [USP'U]/ML
5000 INJECTION INTRAVENOUS; SUBCUTANEOUS EVERY 8 HOURS
Qty: 0 | Refills: 0 | Status: DISCONTINUED | OUTPATIENT
Start: 2018-12-31 | End: 2019-01-02

## 2018-12-31 RX ORDER — BUPIVACAINE 13.3 MG/ML
20 INJECTION, SUSPENSION, LIPOSOMAL INFILTRATION ONCE
Qty: 0 | Refills: 0 | Status: DISCONTINUED | OUTPATIENT
Start: 2018-12-31 | End: 2018-12-31

## 2018-12-31 RX ORDER — PANTOPRAZOLE SODIUM 20 MG/1
40 TABLET, DELAYED RELEASE ORAL
Qty: 0 | Refills: 0 | Status: DISCONTINUED | OUTPATIENT
Start: 2018-12-31 | End: 2019-01-02

## 2018-12-31 RX ADMIN — OXYCODONE HYDROCHLORIDE 5 MILLIGRAM(S): 5 TABLET ORAL at 14:16

## 2018-12-31 RX ADMIN — Medication 650 MILLIGRAM(S): at 17:37

## 2018-12-31 RX ADMIN — LOSARTAN POTASSIUM 25 MILLIGRAM(S): 100 TABLET, FILM COATED ORAL at 17:35

## 2018-12-31 RX ADMIN — Medication 650 MILLIGRAM(S): at 23:44

## 2018-12-31 RX ADMIN — FENTANYL CITRATE 50 MICROGRAM(S): 50 INJECTION INTRAVENOUS at 10:19

## 2018-12-31 RX ADMIN — ATORVASTATIN CALCIUM 10 MILLIGRAM(S): 80 TABLET, FILM COATED ORAL at 21:49

## 2018-12-31 RX ADMIN — OXYCODONE HYDROCHLORIDE 10 MILLIGRAM(S): 5 TABLET ORAL at 11:26

## 2018-12-31 RX ADMIN — HEPARIN SODIUM 5000 UNIT(S): 5000 INJECTION INTRAVENOUS; SUBCUTANEOUS at 14:13

## 2018-12-31 RX ADMIN — FENTANYL CITRATE 50 MICROGRAM(S): 50 INJECTION INTRAVENOUS at 11:20

## 2018-12-31 RX ADMIN — SODIUM CHLORIDE 125 MILLILITER(S): 9 INJECTION, SOLUTION INTRAVENOUS at 12:27

## 2018-12-31 RX ADMIN — Medication 650 MILLIGRAM(S): at 17:35

## 2018-12-31 RX ADMIN — HEPARIN SODIUM 5000 UNIT(S): 5000 INJECTION INTRAVENOUS; SUBCUTANEOUS at 21:49

## 2018-12-31 RX ADMIN — OXYCODONE HYDROCHLORIDE 5 MILLIGRAM(S): 5 TABLET ORAL at 15:36

## 2018-12-31 RX ADMIN — SODIUM CHLORIDE 125 MILLILITER(S): 9 INJECTION, SOLUTION INTRAVENOUS at 20:44

## 2018-12-31 RX ADMIN — Medication 100 GRAM(S): at 07:48

## 2018-12-31 RX ADMIN — OXYCODONE HYDROCHLORIDE 10 MILLIGRAM(S): 5 TABLET ORAL at 11:00

## 2018-12-31 RX ADMIN — FENTANYL CITRATE 50 MICROGRAM(S): 50 INJECTION INTRAVENOUS at 10:49

## 2018-12-31 RX ADMIN — FENTANYL CITRATE 50 MICROGRAM(S): 50 INJECTION INTRAVENOUS at 10:50

## 2018-12-31 NOTE — BRIEF OPERATIVE NOTE - OPERATION/FINDINGS
Primary interrupted closure of fascial defect.  Wound Bed left open and packed with 4x4 gauze.  Intraoperative TAP block performed under direct visualization.

## 2018-12-31 NOTE — BRIEF OPERATIVE NOTE - PROCEDURE
<<-----Click on this checkbox to enter Procedure Ileostomy takedown  12/31/2018  Open Ileostomy Reversal and Intraoperative TAP Block  Active  PEDROS

## 2019-01-01 LAB
ANION GAP SERPL CALC-SCNC: 14 MMOL/L — SIGNIFICANT CHANGE UP (ref 5–17)
BUN SERPL-MCNC: 20 MG/DL — SIGNIFICANT CHANGE UP (ref 8–20)
CALCIUM SERPL-MCNC: 8.2 MG/DL — LOW (ref 8.6–10.2)
CHLORIDE SERPL-SCNC: 101 MMOL/L — SIGNIFICANT CHANGE UP (ref 98–107)
CO2 SERPL-SCNC: 23 MMOL/L — SIGNIFICANT CHANGE UP (ref 22–29)
CREAT SERPL-MCNC: 1.09 MG/DL — SIGNIFICANT CHANGE UP (ref 0.5–1.3)
GLUCOSE SERPL-MCNC: 142 MG/DL — HIGH (ref 70–115)
MAGNESIUM SERPL-MCNC: 1.7 MG/DL — SIGNIFICANT CHANGE UP (ref 1.6–2.6)
PHOSPHATE SERPL-MCNC: 3.7 MG/DL — SIGNIFICANT CHANGE UP (ref 2.4–4.7)
POTASSIUM SERPL-MCNC: 4.4 MMOL/L — SIGNIFICANT CHANGE UP (ref 3.5–5.3)
POTASSIUM SERPL-SCNC: 4.4 MMOL/L — SIGNIFICANT CHANGE UP (ref 3.5–5.3)
SODIUM SERPL-SCNC: 138 MMOL/L — SIGNIFICANT CHANGE UP (ref 135–145)

## 2019-01-01 RX ORDER — MAGNESIUM SULFATE 500 MG/ML
2 VIAL (ML) INJECTION ONCE
Qty: 0 | Refills: 0 | Status: COMPLETED | OUTPATIENT
Start: 2019-01-01 | End: 2019-01-01

## 2019-01-01 RX ADMIN — HEPARIN SODIUM 5000 UNIT(S): 5000 INJECTION INTRAVENOUS; SUBCUTANEOUS at 13:13

## 2019-01-01 RX ADMIN — HEPARIN SODIUM 5000 UNIT(S): 5000 INJECTION INTRAVENOUS; SUBCUTANEOUS at 21:23

## 2019-01-01 RX ADMIN — Medication 650 MILLIGRAM(S): at 13:06

## 2019-01-01 RX ADMIN — SODIUM CHLORIDE 125 MILLILITER(S): 9 INJECTION, SOLUTION INTRAVENOUS at 04:39

## 2019-01-01 RX ADMIN — OXYCODONE HYDROCHLORIDE 5 MILLIGRAM(S): 5 TABLET ORAL at 16:40

## 2019-01-01 RX ADMIN — Medication 650 MILLIGRAM(S): at 12:33

## 2019-01-01 RX ADMIN — Medication 650 MILLIGRAM(S): at 17:26

## 2019-01-01 RX ADMIN — ATORVASTATIN CALCIUM 10 MILLIGRAM(S): 80 TABLET, FILM COATED ORAL at 21:23

## 2019-01-01 RX ADMIN — Medication 650 MILLIGRAM(S): at 06:55

## 2019-01-01 RX ADMIN — HEPARIN SODIUM 5000 UNIT(S): 5000 INJECTION INTRAVENOUS; SUBCUTANEOUS at 06:14

## 2019-01-01 RX ADMIN — LOSARTAN POTASSIUM 25 MILLIGRAM(S): 100 TABLET, FILM COATED ORAL at 06:14

## 2019-01-01 RX ADMIN — PANTOPRAZOLE SODIUM 40 MILLIGRAM(S): 20 TABLET, DELAYED RELEASE ORAL at 06:14

## 2019-01-01 RX ADMIN — Medication 50 GRAM(S): at 15:27

## 2019-01-01 RX ADMIN — Medication 650 MILLIGRAM(S): at 18:33

## 2019-01-01 RX ADMIN — OXYCODONE HYDROCHLORIDE 5 MILLIGRAM(S): 5 TABLET ORAL at 17:25

## 2019-01-01 RX ADMIN — Medication 650 MILLIGRAM(S): at 00:45

## 2019-01-01 RX ADMIN — Medication 650 MILLIGRAM(S): at 06:14

## 2019-01-01 NOTE — PROGRESS NOTE ADULT - SUBJECTIVE AND OBJECTIVE BOX
INTERVAL HPI/OVERNIGHT EVENTS: patient went to OR yesterday for Open Ileostomy Reversal and Intraoperative TAP Block    SUBJECTIVE: Feeling well this AM. No fevers/chills, no N/V.       MEDICATIONS  (STANDING):  acetaminophen   Tablet .. 650 milliGRAM(s) Oral every 6 hours  atorvastatin 10 milliGRAM(s) Oral at bedtime  heparin  Injectable 5000 Unit(s) SubCutaneous every 8 hours  losartan 25 milliGRAM(s) Oral daily  multiple electrolytes Injection Type 1 1000 milliLiter(s) (125 mL/Hr) IV Continuous <Continuous>  pantoprazole    Tablet 40 milliGRAM(s) Oral before breakfast    MEDICATIONS  (PRN):  ondansetron Injectable 4 milliGRAM(s) IV Push every 6 hours PRN Nausea  oxyCODONE    IR 5 milliGRAM(s) Oral every 4 hours PRN Severe Pain (7 - 10)  oxyCODONE    IR 10 milliGRAM(s) Oral every 4 hours PRN Breakthrough pain      Vital Signs Last 24 Hrs  T(C): 36.6 (01 Jan 2019 04:40), Max: 37.1 (31 Dec 2018 06:06)  T(F): 97.9 (01 Jan 2019 04:40), Max: 98.8 (31 Dec 2018 06:06)  HR: 71 (01 Jan 2019 04:40) (69 - 88)  BP: 105/62 (01 Jan 2019 04:40) (105/62 - 131/94)  BP(mean): --  RR: 18 (01 Jan 2019 04:40) (12 - 20)  SpO2: 96% (01 Jan 2019 04:40) (93% - 98%)    PE  Gen: AAO x3, NAD  Pulm: no increased WOB  Abd: Soft, appropriately tender, ostomy dressing c/d/i  Ext: No C/C/E  Neuro: No focal neurological deficits      I&O's Detail      LABS:          PT/INR - ( 31 Dec 2018 07:01 )   PT: 11.0 sec;   INR: 0.96 ratio         PTT - ( 31 Dec 2018 07:01 )  PTT:28.2 sec      RADIOLOGY & ADDITIONAL STUDIES:

## 2019-01-01 NOTE — PROGRESS NOTE ADULT - ASSESSMENT
53 year old male with history of diverticulitis and colostomy which was placed in June 2018, s/p scheduled Healy reversal and diverting ileostomy on 10/24, now POD1 s/p open ileostomy reversal, doing well    Plan:  -NPO until bowel fcn returns  -daily dressing changes  -DVT ppx. No

## 2019-01-02 ENCOUNTER — TRANSCRIPTION ENCOUNTER (OUTPATIENT)
Age: 54
End: 2019-01-02

## 2019-01-02 VITALS
SYSTOLIC BLOOD PRESSURE: 121 MMHG | HEART RATE: 72 BPM | RESPIRATION RATE: 18 BRPM | OXYGEN SATURATION: 95 % | TEMPERATURE: 98 F | DIASTOLIC BLOOD PRESSURE: 76 MMHG

## 2019-01-02 PROBLEM — E78.5 HYPERLIPIDEMIA, UNSPECIFIED: Chronic | Status: ACTIVE | Noted: 2018-12-17

## 2019-01-02 PROBLEM — M10.9 GOUT, UNSPECIFIED: Chronic | Status: ACTIVE | Noted: 2018-12-17

## 2019-01-02 LAB
ANION GAP SERPL CALC-SCNC: 10 MMOL/L — SIGNIFICANT CHANGE UP (ref 5–17)
BUN SERPL-MCNC: 14 MG/DL — SIGNIFICANT CHANGE UP (ref 8–20)
CALCIUM SERPL-MCNC: 7.9 MG/DL — LOW (ref 8.6–10.2)
CHLORIDE SERPL-SCNC: 102 MMOL/L — SIGNIFICANT CHANGE UP (ref 98–107)
CO2 SERPL-SCNC: 27 MMOL/L — SIGNIFICANT CHANGE UP (ref 22–29)
CREAT SERPL-MCNC: 1.06 MG/DL — SIGNIFICANT CHANGE UP (ref 0.5–1.3)
GLUCOSE SERPL-MCNC: 107 MG/DL — SIGNIFICANT CHANGE UP (ref 70–115)
MAGNESIUM SERPL-MCNC: 2 MG/DL — SIGNIFICANT CHANGE UP (ref 1.8–2.6)
PHOSPHATE SERPL-MCNC: 2.7 MG/DL — SIGNIFICANT CHANGE UP (ref 2.4–4.7)
POTASSIUM SERPL-MCNC: 3.8 MMOL/L — SIGNIFICANT CHANGE UP (ref 3.5–5.3)
POTASSIUM SERPL-SCNC: 3.8 MMOL/L — SIGNIFICANT CHANGE UP (ref 3.5–5.3)
SODIUM SERPL-SCNC: 139 MMOL/L — SIGNIFICANT CHANGE UP (ref 135–145)

## 2019-01-02 PROCEDURE — 85610 PROTHROMBIN TIME: CPT

## 2019-01-02 PROCEDURE — 83735 ASSAY OF MAGNESIUM: CPT

## 2019-01-02 PROCEDURE — 85730 THROMBOPLASTIN TIME PARTIAL: CPT

## 2019-01-02 PROCEDURE — 88304 TISSUE EXAM BY PATHOLOGIST: CPT

## 2019-01-02 PROCEDURE — 84100 ASSAY OF PHOSPHORUS: CPT

## 2019-01-02 PROCEDURE — 80048 BASIC METABOLIC PNL TOTAL CA: CPT

## 2019-01-02 PROCEDURE — 36415 COLL VENOUS BLD VENIPUNCTURE: CPT

## 2019-01-02 RX ADMIN — Medication 650 MILLIGRAM(S): at 01:20

## 2019-01-02 RX ADMIN — OXYCODONE HYDROCHLORIDE 5 MILLIGRAM(S): 5 TABLET ORAL at 01:21

## 2019-01-02 RX ADMIN — HEPARIN SODIUM 5000 UNIT(S): 5000 INJECTION INTRAVENOUS; SUBCUTANEOUS at 05:45

## 2019-01-02 RX ADMIN — OXYCODONE HYDROCHLORIDE 5 MILLIGRAM(S): 5 TABLET ORAL at 06:53

## 2019-01-02 RX ADMIN — Medication 650 MILLIGRAM(S): at 06:45

## 2019-01-02 RX ADMIN — Medication 650 MILLIGRAM(S): at 05:45

## 2019-01-02 RX ADMIN — LOSARTAN POTASSIUM 25 MILLIGRAM(S): 100 TABLET, FILM COATED ORAL at 05:45

## 2019-01-02 RX ADMIN — Medication 650 MILLIGRAM(S): at 00:21

## 2019-01-02 RX ADMIN — PANTOPRAZOLE SODIUM 40 MILLIGRAM(S): 20 TABLET, DELAYED RELEASE ORAL at 05:45

## 2019-01-02 RX ADMIN — OXYCODONE HYDROCHLORIDE 5 MILLIGRAM(S): 5 TABLET ORAL at 00:22

## 2019-01-02 RX ADMIN — OXYCODONE HYDROCHLORIDE 5 MILLIGRAM(S): 5 TABLET ORAL at 05:52

## 2019-01-02 NOTE — DISCHARGE NOTE ADULT - PATIENT PORTAL LINK FT
You can access the [facility] Patient Portal, offered by [facility], by registering with the following website: [email or web address]

## 2019-01-02 NOTE — DISCHARGE NOTE ADULT - MEDICATION SUMMARY - MEDICATIONS TO STOP TAKING
I will STOP taking the medications listed below when I get home from the hospital:    Vicodin 5 mg-300 mg oral tablet  -- 1 tab(s) by mouth prn

## 2019-01-02 NOTE — DISCHARGE NOTE ADULT - CARE PLAN
Principal Discharge DX:	Diverticulitis  Goal:	Continued post surgical recovery  Assessment and plan of treatment:	Please call and make an appointment with [facility] in 10-14 days after discharge. Also, please call and make an appointment with your primary care physician as per your usual schedule.   Activity: Avoid heavy lifting or strenuous activity until follow-up appointment.   Diet: May continue Regular diet.  Medications: Please take all home medications as prescribed by your primary care doctor. Pain medication has been prescribed for you. Please, take it as it has been prescribed, do not drive or operate heavy machinery while taking narcotics.   Wound Care: Please keep surgical site clean and dry. You may shower, but do not bathe    If confusion, altered mental status, fever, chest pain, shortness of breath, severe or worsening pain, vomiting, change or worsening of medical status, please come back to the emergency room, and in case of emergency call [phone] Principal Discharge DX:	Diverticulitis  Goal:	Continued post surgical recovery  Assessment and plan of treatment:	Please call and make an appointment with [facility] in 10-14 days after discharge. Also, please call and make an appointment with your primary care physician as per your usual schedule.   Activity: Avoid heavy lifting or strenuous activity until follow-up appointment.   Diet: May continue Regular diet.  Medications: Please take all home medications as prescribed by your primary care doctor. Pain medication has been prescribed for you. Please, take it as it has been prescribed, do not drive or operate heavy machinery while taking narcotics.   Wound Care: Please keep surgical site clean and dry. You may shower, but do not bathe. Wet to dry dressing to surgical site daily:  Remove packing then shower after and then reapply dressing daily  If confusion, altered mental status, fever, chest pain, shortness of breath, severe or worsening pain, vomiting, change or worsening of medical status, please come back to the emergency room, and in case of emergency call [phone]

## 2019-01-02 NOTE — DISCHARGE NOTE ADULT - CARE PROVIDER_API CALL
Acute Care Surgery,   [address] [place] [address]  Phone: [phone]  Fax: (   )    -

## 2019-01-02 NOTE — DISCHARGE NOTE ADULT - PROVIDER TOKENS
FREE:[LAST:[Acute Care Surgery],PHONE:[phone]],FAX:[phone]   [phone]    -],ADDRESS:[[address] [place] [address]]]

## 2019-01-02 NOTE — DISCHARGE NOTE ADULT - MEDICATION SUMMARY - MEDICATIONS TO TAKE
I will START or STAY ON the medications listed below when I get home from the hospital:    Percocet 5/325 oral tablet  -- 1-2  tab(s) by mouth every 6 hours, As Needed for moderate to severe pain MDD:6   -- Caution federal law prohibits the transfer of this drug to any person other  than the person for whom it was prescribed.  May cause drowsiness.  Alcohol may intensify this effect.  Use care when operating dangerous machinery.  This prescription cannot be refilled.  This product contains acetaminophen.  Do not use  with any other product containing acetaminophen to prevent possible liver damage.  Using more of this medication than prescribed may cause serious breathing problems.    -- Indication: For Diverticulitis of intestine with perforation or abscess without bleeding    acetaminophen 325 mg oral tablet  -- 2 tab(s) by mouth prn  -- Indication: For Diverticulitis of intestine with perforation or abscess without bleeding    losartan 25 mg oral tablet  -- 1 tab(s) by mouth once a day  -- Indication: For HTN    atorvastatin 10 mg oral tablet  -- 1 tab(s) by mouth once a day  -- Indication: For Hyperlipidemia    pantoprazole 40 mg oral delayed release tablet  -- 1 tab(s) by mouth once a day before meals  -- Indication: For Home med

## 2019-01-02 NOTE — DISCHARGE NOTE ADULT - HOSPITAL COURSE
54 yo male with history of diverticulitis and colostomy which was placed in June 2018, was admitted on 10/24 for a same day scheduled Healy reversal and diverting ileostomy, now presents for ileostomy reversal.  Pt with reversal on 12/31.  Pt with no intra-op or post op complications.  Pt's pain controlled with current regimen.  Pt has been ambulating and tolerating diet.  Pt denies chest pain, SOB, fever/chills, vomiting, dysuria.  Passing flatus with BM and voiding.  Pt stable for DC home with outpatient follow-up.

## 2019-01-02 NOTE — PROGRESS NOTE ADULT - SUBJECTIVE AND OBJECTIVE BOX
INTERVAL HPI/OVERNIGHT EVENTS/SUBJECTIVE:  POD 2 ileostomy reversal. Diet advanced to CLD yesterday, tolerating well.  + flatus + BM.  Pain well controlled.      ICU Vital Signs Last 24 Hrs  T(C): 36.2 (02 Jan 2019 06:18), Max: 36.7 (01 Jan 2019 19:10)  T(F): 97.2 (02 Jan 2019 06:18), Max: 98 (01 Jan 2019 19:10)  HR: 73 (02 Jan 2019 06:18) (60 - 73)  BP: 125/76 (02 Jan 2019 06:18) (110/70 - 132/81)  BP(mean): --  ABP: --  ABP(mean): --  RR: 19 (02 Jan 2019 06:18) (18 - 19)  SpO2: 94% (02 Jan 2019 06:18) (93% - 95%)      I&O's Detail    01 Jan 2019 07:01  -  02 Jan 2019 07:00  --------------------------------------------------------  IN:    multiple electrolytes Injection Type 1multiple electrolytes Injection Type 1: 375 mL  Total IN: 375 mL    OUT:  Total OUT: 0 mL    Total NET: 375 mL                MEDICATIONS  (STANDING):  acetaminophen   Tablet .. 650 milliGRAM(s) Oral every 6 hours  atorvastatin 10 milliGRAM(s) Oral at bedtime  heparin  Injectable 5000 Unit(s) SubCutaneous every 8 hours  losartan 25 milliGRAM(s) Oral daily  pantoprazole    Tablet 40 milliGRAM(s) Oral before breakfast    MEDICATIONS  (PRN):  ondansetron Injectable 4 milliGRAM(s) IV Push every 6 hours PRN Nausea  oxyCODONE    IR 5 milliGRAM(s) Oral every 4 hours PRN Severe Pain (7 - 10)  oxyCODONE    IR 10 milliGRAM(s) Oral every 4 hours PRN Breakthrough pain      NUTRITION/IVF: CLD/IVL      PHYSICAL EXAM:    Gen:  NAD, awakens to voice    Eyes:  EOMI's    Neurological:  A&O x3    ENMT:  MMM    Neck:  Supple    Pulmonary:  Unlabored    Cardiovascular:  NSR    Gastrointestinal:  Obese, softly distended.  Grossly nontender without rebound or guarding    Genitourinary:  Voids    Extremities:  AFROM x4    Skin:  Post op site C/D/I.  Dressing changed, pink healthy tissue.  No erythema or drainage noted    Musculoskeletal:  No pedal edema noted BL          LABS:    01-01    138  |  101  |  20.0  ----------------------------<  142<H>  4.4   |  23.0  |  1.09    Ca    8.2<L>      01 Jan 2019 05:38  Phos  3.7     01-01  Mg     1.7     01-01          RECENT CULTURES:            CAPILLARY BLOOD GLUCOSE      RADIOLOGY & ADDITIONAL STUDIES:    ASSESSMENT/PLAN:  53yMale presenting with:  s/p Healy's reversal POD 2 ileostomy reversal    Neuro:  Pain well controlled on PO pain meds    CV:  Hemodynamically normal.  Afebrile    Pulm:  IS, OOBTC    GI/Nutrition:  diet advanced to regular today.  On bowel regimen.  + Flatus, BM    /Renal:  Voids    ID:  None    Lines/Tubes:  None    Endo:  None    Skin:  Daily wet to dry dressing changes    Proph:  SQH    Dispo:  Diet advanced today.  Possible for d/c today.  Will discuss with SW for home care      CRITICAL CARE TIME SPENT:

## 2019-01-02 NOTE — DISCHARGE NOTE ADULT - PLAN OF CARE
Continued post surgical recovery Please call and make an appointment with [facility] in 10-14 days after discharge. Also, please call and make an appointment with your primary care physician as per your usual schedule.   Activity: Avoid heavy lifting or strenuous activity until follow-up appointment.   Diet: May continue Regular diet.  Medications: Please take all home medications as prescribed by your primary care doctor. Pain medication has been prescribed for you. Please, take it as it has been prescribed, do not drive or operate heavy machinery while taking narcotics.   Wound Care: Please keep surgical site clean and dry. You may shower, but do not bathe    If confusion, altered mental status, fever, chest pain, shortness of breath, severe or worsening pain, vomiting, change or worsening of medical status, please come back to the emergency room, and in case of emergency call [phone] Please call and make an appointment with [facility] in 10-14 days after discharge. Also, please call and make an appointment with your primary care physician as per your usual schedule.   Activity: Avoid heavy lifting or strenuous activity until follow-up appointment.   Diet: May continue Regular diet.  Medications: Please take all home medications as prescribed by your primary care doctor. Pain medication has been prescribed for you. Please, take it as it has been prescribed, do not drive or operate heavy machinery while taking narcotics.   Wound Care: Please keep surgical site clean and dry. You may shower, but do not bathe. Wet to dry dressing to surgical site daily:  Remove packing then shower after and then reapply dressing daily  If confusion, altered mental status, fever, chest pain, shortness of breath, severe or worsening pain, vomiting, change or worsening of medical status, please come back to the emergency room, and in case of emergency call [phone]

## 2019-01-03 LAB — SURGICAL PATHOLOGY STUDY: SIGNIFICANT CHANGE UP

## 2019-01-10 ENCOUNTER — APPOINTMENT (OUTPATIENT)
Dept: TRAUMA SURGERY | Facility: CLINIC | Age: 54
End: 2019-01-10
Payer: MEDICARE

## 2019-01-10 VITALS
WEIGHT: 223 LBS | TEMPERATURE: 98.6 F | OXYGEN SATURATION: 95 % | DIASTOLIC BLOOD PRESSURE: 99 MMHG | HEART RATE: 86 BPM | BODY MASS INDEX: 33.03 KG/M2 | SYSTOLIC BLOOD PRESSURE: 154 MMHG | HEIGHT: 69 IN

## 2019-01-10 PROCEDURE — 99024 POSTOP FOLLOW-UP VISIT: CPT

## 2019-01-10 NOTE — HISTORY OF PRESENT ILLNESS
[FreeTextEntry1] : 52 yo male s/p ileostomy reversal has no real complaionts and is taking regular diet with good bowel function.

## 2019-01-10 NOTE — PHYSICAL EXAM
[Abdominal Masses] : No abdominal masses [Abdomen Tenderness] : ~T ~M No abdominal tenderness [de-identified] : obese but soft. The stoma wound is clean and granulating. I removed the nylons as secondary closure will not be possible. I redressed in xeroform.

## 2019-01-10 NOTE — ASSESSMENT
[FreeTextEntry1] : s/p loop ileostomy reversal placed for protection of low anterior anastomosis. He is doing well and will RTC for wound check next week.

## 2019-01-17 ENCOUNTER — APPOINTMENT (OUTPATIENT)
Age: 54
End: 2019-01-17
Payer: MEDICARE

## 2019-01-17 VITALS
OXYGEN SATURATION: 97 % | WEIGHT: 219 LBS | TEMPERATURE: 97.7 F | HEIGHT: 69 IN | BODY MASS INDEX: 32.44 KG/M2 | HEART RATE: 93 BPM | SYSTOLIC BLOOD PRESSURE: 112 MMHG | DIASTOLIC BLOOD PRESSURE: 76 MMHG

## 2019-01-17 PROCEDURE — 99024 POSTOP FOLLOW-UP VISIT: CPT

## 2019-01-17 RX ORDER — COLLAGENASE SANTYL 250 [ARB'U]/G
250 OINTMENT TOPICAL DAILY
Qty: 1 | Refills: 0 | Status: ACTIVE | COMMUNITY
Start: 2019-01-17 | End: 1900-01-01

## 2019-01-17 NOTE — ASSESSMENT
[FreeTextEntry1] : Doing well\par AgNO3 to granulation\par Start collagenase/DSD daily\par RTC 2 weeks

## 2019-01-18 RX ORDER — COLLAGENASE SANTYL 250 [ARB'U]/G
250 OINTMENT TOPICAL DAILY
Qty: 1 | Refills: 3 | Status: ACTIVE | COMMUNITY
Start: 2019-01-18 | End: 1900-01-01

## 2019-01-31 ENCOUNTER — APPOINTMENT (OUTPATIENT)
Dept: TRAUMA SURGERY | Facility: CLINIC | Age: 54
End: 2019-01-31
Payer: MEDICARE

## 2019-01-31 VITALS
SYSTOLIC BLOOD PRESSURE: 130 MMHG | HEART RATE: 83 BPM | HEIGHT: 69 IN | DIASTOLIC BLOOD PRESSURE: 90 MMHG | TEMPERATURE: 98.4 F | OXYGEN SATURATION: 96 % | BODY MASS INDEX: 33.62 KG/M2 | WEIGHT: 227 LBS

## 2019-01-31 PROCEDURE — 99024 POSTOP FOLLOW-UP VISIT: CPT

## 2019-01-31 NOTE — HISTORY OF PRESENT ILLNESS
[FreeTextEntry1] : Here for wound care follow up\par No new problems\par Eating well\par Using collagenase on old colostomy site

## 2019-01-31 NOTE — PHYSICAL EXAM
[de-identified] : Soft, non-tender.  Midline wound intact.  Old colostomy site healing, some hypertrophic granulation.  Otherwise clean.

## 2019-01-31 NOTE — ASSESSMENT
[FreeTextEntry1] : AgNO3 to granulation tissue\par Shower\par Collagenase/DSD daily\par RTC 2 weeks.

## 2019-02-12 ENCOUNTER — APPOINTMENT (OUTPATIENT)
Dept: TRAUMA SURGERY | Facility: CLINIC | Age: 54
End: 2019-02-12
Payer: MEDICARE

## 2019-02-12 VITALS
WEIGHT: 228 LBS | TEMPERATURE: 98.4 F | HEIGHT: 69 IN | SYSTOLIC BLOOD PRESSURE: 111 MMHG | BODY MASS INDEX: 33.77 KG/M2 | OXYGEN SATURATION: 94 % | DIASTOLIC BLOOD PRESSURE: 80 MMHG | HEART RATE: 79 BPM

## 2019-02-12 PROCEDURE — 99024 POSTOP FOLLOW-UP VISIT: CPT

## 2019-02-13 NOTE — ASSESSMENT
[FreeTextEntry1] : RTC 2 weeks  for  recheck\par diet modification   advised  patient  to be  careful of weight  gain\par any acute  symptoms  and  or  concerns call back [facility] or  go  directly to [facility]

## 2019-02-13 NOTE — PHYSICAL EXAM
[Abdominal Masses] : No abdominal masses [Abdomen Tenderness] : ~T ~M No abdominal tenderness [No Rash or Lesion] : No rash or lesion [Alert] : alert [Oriented to Person] : oriented to person [Oriented to Place] : oriented to place [Oriented to Time] : oriented to time [Calm] : calm [de-identified] : wdwn  male  in  nad  pleasant manner [de-identified] : non icteric sclera [de-identified] : Soft, non-tender.no distension  Midline wound intact.  Old colostomy site healing, some hypertrophic granulation. no  signs  of  cellulitis    wound  is  shallow AgNO3 applied    dry gauze placed

## 2019-02-13 NOTE — HISTORY OF PRESENT ILLNESS
[FreeTextEntry1] : Patient presents  to [facility]  clinic  for  post op exam  and  wound  check  Patient  denies  any  fevers  no  chills  no n/v/d nl bm nl urination\par Eating well, nl appetite \par Using collagenase on old colostomy site with  no  consequences  and  or  complications\par NO physical complaints

## 2019-02-28 ENCOUNTER — APPOINTMENT (OUTPATIENT)
Dept: TRAUMA SURGERY | Facility: CLINIC | Age: 54
End: 2019-02-28
Payer: MEDICARE

## 2019-02-28 VITALS
DIASTOLIC BLOOD PRESSURE: 81 MMHG | HEART RATE: 97 BPM | WEIGHT: 233 LBS | BODY MASS INDEX: 34.51 KG/M2 | RESPIRATION RATE: 14 BRPM | SYSTOLIC BLOOD PRESSURE: 117 MMHG | HEIGHT: 69 IN | OXYGEN SATURATION: 97 % | TEMPERATURE: 97.8 F

## 2019-02-28 DIAGNOSIS — Z98.890 OTHER SPECIFIED POSTPROCEDURAL STATES: ICD-10-CM

## 2019-02-28 DIAGNOSIS — K57.80 DIVERTICULITIS OF INTESTINE, PART UNSPECIFIED, WITH PERFORATION AND ABSCESS W/OUT BLEEDING: ICD-10-CM

## 2019-02-28 PROCEDURE — 99024 POSTOP FOLLOW-UP VISIT: CPT

## 2019-03-08 PROBLEM — K57.80 DIVERTICULITIS OF INTESTINE WITH PERFORATION WITHOUT BLEEDING, UNSPECIFIED PART OF INTESTINAL TRACT: Status: ACTIVE | Noted: 2018-07-12

## 2019-03-08 PROBLEM — Z98.890 HISTORY OF COLOSTOMY REVERSAL: Status: ACTIVE | Noted: 2019-03-08

## 2019-03-08 NOTE — ASSESSMENT
[FreeTextEntry1] : RTC 3weeks  \par continue  bowel regimen\par Diet modification - gaining weight  to  mid section\par any acute  symptoms  and  or  concerns  call back [facility] or  go  directly to [facility]

## 2019-03-08 NOTE — HISTORY OF PRESENT ILLNESS
[FreeTextEntry1] : Patient presents  to [facility]  clinic  for  post op exam  and  wound  check  Patient  denies  any  fevers  no  chills  no n/v/d nl bm nl urination\par Eating well, nl appetite \par Using collagenase on old colostomy site with  no  consequences  and  or  complications\par NO physical complaints Wife at  bedside  for  entire  exam

## 2019-03-08 NOTE — PHYSICAL EXAM
[Abdominal Masses] : No abdominal masses [Abdomen Tenderness] : ~T ~M No abdominal tenderness [No Rash or Lesion] : No rash or lesion [Alert] : alert [Oriented to Person] : oriented to person [Oriented to Place] : oriented to place [Oriented to Time] : oriented to time [Calm] : calm [de-identified] : wdwn  male  in  nad  pleasant manner [de-identified] : non icteric sclera [de-identified] : Soft, non-tender.no distension  Midline wound intact.  Old colostomy site healing, some hypertrophic granulation. no  signs  of  cellulitis    wound  is  shallow AgNO3 applied    dry gauze placed weight  gain  to  abdomen

## 2019-03-21 ENCOUNTER — APPOINTMENT (OUTPATIENT)
Dept: TRAUMA SURGERY | Facility: CLINIC | Age: 54
End: 2019-03-21
Payer: MEDICARE

## 2019-03-21 VITALS
HEIGHT: 69 IN | BODY MASS INDEX: 33.18 KG/M2 | DIASTOLIC BLOOD PRESSURE: 82 MMHG | TEMPERATURE: 97.9 F | SYSTOLIC BLOOD PRESSURE: 133 MMHG | WEIGHT: 224 LBS | OXYGEN SATURATION: 99 % | RESPIRATION RATE: 14 BRPM | HEART RATE: 80 BPM

## 2019-03-21 DIAGNOSIS — Z93.3 COLOSTOMY STATUS: ICD-10-CM

## 2019-03-21 DIAGNOSIS — Z86.39 PERSONAL HISTORY OF OTHER ENDOCRINE, NUTRITIONAL AND METABOLIC DISEASE: ICD-10-CM

## 2019-03-21 DIAGNOSIS — K43.2 INCISIONAL HERNIA W/OUT OBSTRUCTION OR GANGRENE: ICD-10-CM

## 2019-03-21 DIAGNOSIS — Z86.79 PERSONAL HISTORY OF OTHER DISEASES OF THE CIRCULATORY SYSTEM: ICD-10-CM

## 2019-03-21 DIAGNOSIS — Z87.19 PERSONAL HISTORY OF OTHER DISEASES OF THE DIGESTIVE SYSTEM: ICD-10-CM

## 2019-03-21 DIAGNOSIS — Z87.39 PERSONAL HISTORY OF OTHER DISEASES OF THE MUSCULOSKELETAL SYSTEM AND CONNECTIVE TISSUE: ICD-10-CM

## 2019-03-21 DIAGNOSIS — M50.90 CERVICAL DISC DISORDER, UNSPECIFIED, UNSPECIFIED CERVICAL REGION: ICD-10-CM

## 2019-03-21 PROCEDURE — 99024 POSTOP FOLLOW-UP VISIT: CPT

## 2019-03-21 NOTE — ASSESSMENT
[FreeTextEntry1] : Supraumbilical incisional hernia.\par Discuss with patient natural history of incisional hernia and the recommendation to repair, she is loosing weight and would like to continue with his weight loss program and be reevaluated in late summer for possible hernia repair in the fall.\par He understand the risk on incarceration and progression of fascial retraction.\par RTO in 3 months of pRN

## 2019-03-21 NOTE — HISTORY OF PRESENT ILLNESS
[de-identified] : Re and reversal on 2018.\par has developed a bulge on supraumbilical area of wound,\par no fevers, no chills, tilerating diet, having BM and has lost 9lbs\par

## 2019-03-21 NOTE — PHYSICAL EXAM
[JVD] : no jugular venous distention  [Normal Breath Sounds] : Normal breath sounds [Normal Heart Sounds] : normal heart sounds [de-identified] : NAD [de-identified] : Obese, well healed midlin scar and stoma site (LLQ)\par there is a 10 cm by 10cm incisional hernia in the supraumbilical . no skin changes, no pain.

## 2019-07-11 ENCOUNTER — APPOINTMENT (OUTPATIENT)
Dept: TRAUMA SURGERY | Facility: CLINIC | Age: 54
End: 2019-07-11

## 2020-05-26 NOTE — PROGRESS NOTE ADULT - PROBLEM SELECTOR PLAN 1
Internal Medicine Antibiotics as per ID. Management as per Surgery. Repeat labs ordered for the AM. Pt. will eventually require surgery but hopefully emergent or urgent surgery can be avoided.

## 2020-08-31 NOTE — H&P PST ADULT - BREASTS COMMENTS
pt refused
PAST MEDICAL HISTORY:  Hyperlipidemia     Insomnia     Pain in joint, foot, left     Proteinuria

## 2021-11-24 NOTE — ASU PREOP CHECKLIST - HEIGHT IN INCHES
Patient Name:  Nathan Fung   YOB: 1960   MRN:  [id number]       Community Support - Individual Progress Note    Date: 11/24/2021    The encounter diagnosis was Moderate episode of recurrent major depressive disorder (CMS/HCC).    Data and progress toward goals:  This writer met with pt and his , Radha Haq, at [facility] to discuss his treatment plan while he is residing there since he was unaware of his goals while there.  Pt presents as cooperative with conversation, engaged throughout session and willing to allow his  at [facility], Radha Haq, to join. Pt states that he is happy that this writer came back to see him the day before Thanksgiving and his birthday. Pt reports that he is sleeping well and adjusting to the facility. Patient denies SI/HI/AVH and is oriented x4.    Intervention:  Consultation with Patient or Collaterals to increase Well-being and Decision Making and Treatment Planning    Patient continues to be involved in service planning:  YES    Describe Intervention(s): Clinician provided resources, skill building, wellness ideas and resilience tools to assist in modifying behaviors to promote recovery.  This writer utilized empathic and reflective listening throughout the conversation with pt and his  at [facility]. Writer consulted with pt and his  to increase well-being and decision making going forward in treatment planning for pt.     Describe patient's response to today's intervention:  [facility] how his rules, regulations, and goals while he is residing at the facility. Earlier this week pt discussed with this writer he was unsure what was going on. Radha informed this writer and pt that his main goal while living there is finding him a senior living facility. Radha mentioned that she has applied pt to [facility] so far, but is waiting for a  response. Pt was appreciative of hearing that information. Pt asked Radha if he is able to obtain a cell phone while living there or leave campus throughout the days since he is unable to now and he does not understand.  It was noted that he is able to have a cell phone and once he is off \"quaratine\" he is able to live the facility throughout the day. Radha discussed that since pt is not vaccinated he is on \"quaratine\" since he had a medical procedure last week.  It was explained to this writer and pt what the protocols are for the facility for unvaccinated individuals. Pt discussed how at this moment he is not ready to be vaccinated. It was discussed that he is also able to receive mail while he resides there, which is helpful since pt is in need of a new social security card, as well as new SSI debit card. This writer and pt discussed how next week during the visit they will make calls for social security card and debit card, as well as work on obtaining a cell phone for pt. SW provided pt with a birthday cupcake due to it being his birthday tomorrow on Thanksgiving and not being able to see family. Pt was appreciative of it and became tearful.    Plan and follow-up: Continue to support patient's efforts and progress towards rehabilitative, resiliency and recovery goals in the following ways:  [facility]    Next appointment scheduled for:  Monday November 29, 2021 at 1pm    Off-site:  Yes:  Off-site location:  [facility]     Duration: 30 minutes         9

## 2022-04-06 ENCOUNTER — APPOINTMENT (OUTPATIENT)
Dept: ORTHOPEDIC SURGERY | Facility: CLINIC | Age: 57
End: 2022-04-06
Payer: OTHER MISCELLANEOUS

## 2022-04-06 VITALS — WEIGHT: 225 LBS | BODY MASS INDEX: 34.1 KG/M2 | HEIGHT: 68 IN

## 2022-04-06 DIAGNOSIS — I10 ESSENTIAL (PRIMARY) HYPERTENSION: ICD-10-CM

## 2022-04-06 DIAGNOSIS — M54.50 LOW BACK PAIN, UNSPECIFIED: ICD-10-CM

## 2022-04-06 PROCEDURE — 99072 ADDL SUPL MATRL&STAF TM PHE: CPT

## 2022-04-06 PROCEDURE — 99214 OFFICE O/P EST MOD 30 MIN: CPT

## 2022-04-06 NOTE — PHYSICAL EXAM
[] : full ROM with pain [FreeTextEntry8] : radiating down the sides of the legs on both sides to the knees \par using cane \par antalgic gait

## 2022-04-06 NOTE — DISCUSSION/SUMMARY
[Medication Risks Reviewed] : Medication risks reviewed [de-identified] : reviewed the case with him \par medication refilled \par We have prescribed the patient a controlled substance\par We have discussed that this could be an addictive situation\par The medication may not take away the pain and may lead to addiction\par Overuse of the medication may result in death or severe injury which is not recoverable\par \par

## 2022-04-06 NOTE — HISTORY OF PRESENT ILLNESS
[Lower back] : lower back [Work related] : work related [Gradual] : gradual [8] : 8 [7] : 7 [Localized] : localized [Radiating] : radiating [Sharp] : sharp [Constant] : constant [Household chores] : household chores [Work] : work [Sleep] : sleep [Meds] : meds [Standing] : standing [Walking] : walking [Not working due to injury] : Work status: not working due to injury [de-identified] : lower back pain\par no changes \par using medication - not having adverse side effects with this  [] : no [FreeTextEntry2] : 09/25/2015 [FreeTextEntry5] : pt was picking up a case and after getting up felt pain in the lower back  [FreeTextEntry7] : legs [FreeTextEntry9] : hydrocodone 5-300 mg [de-identified] : hydrocodone 5-300 mg  [de-identified] : rachel ivory

## 2022-05-18 ENCOUNTER — APPOINTMENT (OUTPATIENT)
Dept: ORTHOPEDIC SURGERY | Facility: CLINIC | Age: 57
End: 2022-05-18
Payer: OTHER MISCELLANEOUS

## 2022-05-18 PROCEDURE — 99213 OFFICE O/P EST LOW 20 MIN: CPT

## 2022-05-18 PROCEDURE — 99072 ADDL SUPL MATRL&STAF TM PHE: CPT

## 2022-05-18 RX ORDER — HYDROCODONE BITARTRATE AND ACETAMINOPHEN 5; 300 MG/1; MG/1
5-300 TABLET ORAL
Qty: 56 | Refills: 0 | Status: ACTIVE | COMMUNITY
Start: 2022-05-18 | End: 1900-01-01

## 2022-05-18 NOTE — HISTORY OF PRESENT ILLNESS
[Lower back] : lower back [Work related] : work related [Gradual] : gradual [8] : 8 [7] : 7 [Localized] : localized [Radiating] : radiating [Sharp] : sharp [Constant] : constant [Household chores] : household chores [Work] : work [Sleep] : sleep [Meds] : meds [Standing] : standing [Walking] : walking [Not working due to injury] : Work status: not working due to injury [de-identified] : WC DOI 9/25/22\par \par 5/18/22; Here for follow-up on chronic pain to the low back. Pain shoots down legs; no n/t. No new onset weakness or loss of b/b control.He has been doing home exercise and stretching. He reports no side effects to the medication. He is requesting a renewal on medications. He is using a cane. [] : no [FreeTextEntry2] : 09/25/2015 [FreeTextEntry5] : pt was picking up a case and after getting up felt pain in the lower back  [FreeTextEntry7] : legs [FreeTextEntry9] : hydrocodone 5-300 mg [de-identified] : hydrocodone 5-300 mg  [de-identified] : rachel ivory

## 2022-05-18 NOTE — WORK
[Was the competent medical cause of the injury] : was the competent medical cause of the injury [Consistent with my objective findings] : consistent with my objective findings [Total] : total [Does not reveal pre-existing condition(s) that may affect treatment/prognosis] : does not reveal pre-existing condition(s) that may affect treatment/prognosis [Cannot return to work because ________] : cannot return to work because [unfilled]

## 2022-06-23 NOTE — BRIEF OPERATIVE NOTE - SURGICAL END DATE/TIME
Daily Note     Today's date: 2022  Patient name: Eddy Breen  : 1947  MRN: [id number]  Referring provider: Юлия Mariscal DO  Dx:   Encounter Diagnosis     ICD-10-CM    1  Other spondylosis with radiculopathy, lumbar region  M47 26    2  Gait abnormality  R26 9                   Subjective: Pt notes she is feeling good today      Objective: See treatment diary below      Assessment:  Pt tolerated treatment session fairly well  Tolerates sitting exercises well; increased challenge noted with weight bearing activity due to decreased LE strength and impaired balance  Pt would benefit from continued OP PT services  Plan: Continue per plan of care        Precautions:  PACEMAKER, PMH DM, JOS, CHF, Afib, HTN, Left ankle and femoral fracture repairs        Manuals                                       Neuro Re-Ed        Foot onto step-Front and Lateral         Cone toe taps        NBOS stance on foam         Stand lumbar extension against bar         Monster walk   6x10' no band      Side step walk   6x10' no band      Backwards walk         Ther Ex        Nu-step   10'  L2      Seated hip march   2x10      Seated LAQ   2x10      Stand hip march, ABD, and Ext   2x10      Bridge/glute set   2x10                                      HEP update/review         Ther Activity                        Gait Training                        Modalities        MHP to the left hip or/and LB PRN  Seated MHP low back 15' 25-Jun-2018 17:31

## 2022-06-29 ENCOUNTER — APPOINTMENT (OUTPATIENT)
Dept: ORTHOPEDIC SURGERY | Facility: CLINIC | Age: 57
End: 2022-06-29

## 2022-06-29 PROCEDURE — 99072 ADDL SUPL MATRL&STAF TM PHE: CPT

## 2022-06-29 PROCEDURE — 99214 OFFICE O/P EST MOD 30 MIN: CPT

## 2022-06-30 NOTE — HISTORY OF PRESENT ILLNESS
[Lower back] : lower back [Work related] : work related [Gradual] : gradual [Sudden] : sudden [Burning] : burning [Shooting] : shooting [Stabbing] : stabbing [Sitting] : sitting [Exercising] : exercising [Stairs] : stairs [Not working due to injury] : Work status: not working due to injury [8] : 8 [7] : 7 [Localized] : localized [Radiating] : radiating [Sharp] : sharp [Constant] : constant [Household chores] : household chores [Work] : work [Sleep] : sleep [Meds] : meds [Standing] : standing [Walking] : walking [de-identified] :  DOI 9/25/22\par \par 5/18/22; Here for follow-up on chronic pain to the low back. Pain shoots down legs; no n/t. No new onset weakness or loss of b/b control. He has been doing home exercise and stretching. He reports no side effects to the medication. He is requesting a renewal on medications. He is using a cane.\par \par 6/29/22: Here for follow up Chronic low back pain. Pain and symptoms persists. Needs refill on meds\par activity remains limited [] : Post Surgical Visit: no [FreeTextEntry1] : L spine  [FreeTextEntry2] : 09/25/2015 [FreeTextEntry5] : pt was picking up a case and after getting up felt pain in the lower back  [FreeTextEntry7] : legs [FreeTextEntry9] : hydrocodone 5-300 mg [de-identified] : hydrocodone 5-300 mg  [de-identified] : rachel ivory

## 2022-06-30 NOTE — DISCUSSION/SUMMARY
[Medication Risks Reviewed] : Medication risks reviewed [de-identified] : Chronic opiate medication use discussed. Will continue to wean off the medication appropriately. fu 6 weeks

## 2022-06-30 NOTE — REASON FOR VISIT
[FreeTextEntry2] : Follow up for a schedule loss of use visit for a workers comp case for the L spine

## 2022-07-04 ENCOUNTER — FORM ENCOUNTER (OUTPATIENT)
Age: 57
End: 2022-07-04

## 2022-07-18 NOTE — H&P PST ADULT - RESPIRATORY RATE (BREATHS/MIN)
Health Maintenance Due   Topic Date Due    Pneumococcal 0-64 years (2 - PCV) 09/01/2018    Lipid Screen  08/03/2021    COVID-19 Vaccine (3 - Booster for [employer] series) 09/22/2021       Chief Complaint   Patient presents with   [facility]     Derm     Other     f/u       1. Have you been to the ER, urgent care clinic since your last visit? Hospitalized since your last visit? No    2. Have you seen or consulted any other health care providers outside of the [address] [place] [address] since your last visit? Include any pap smears or colon screening. No    3) Do you have an Advance Directive on file? no    4) Are you interested in receiving information on Advance Directives? NO      Patient is accompanied by self I have received verbal consent from Rico Marroquin to discuss any/all medical information while they are present in the room. 16

## 2022-08-10 ENCOUNTER — APPOINTMENT (OUTPATIENT)
Dept: ORTHOPEDIC SURGERY | Facility: CLINIC | Age: 57
End: 2022-08-10

## 2022-08-10 PROCEDURE — 99072 ADDL SUPL MATRL&STAF TM PHE: CPT

## 2022-08-10 PROCEDURE — 99214 OFFICE O/P EST MOD 30 MIN: CPT

## 2022-08-10 NOTE — DISCUSSION/SUMMARY
[Medication Risks Reviewed] : Medication risks reviewed [de-identified] : Chronic opiate medication use discussed. Will continue to wean off the medication appropriately. fu 6 weeks

## 2022-08-10 NOTE — HISTORY OF PRESENT ILLNESS
[Lower back] : lower back [Burning] : burning [Shooting] : shooting [Stabbing] : stabbing [Intermittent] : intermittent [Not working due to injury] : Work status: not working due to injury [de-identified] :  DOI 9/25/22\par \par 5/18/22; Here for follow-up on chronic pain to the low back. Pain shoots down legs; no n/t. No new onset weakness or loss of b/b control. He has been doing home exercise and stretching. He reports no side effects to the medication. He is requesting a renewal on medications. He is using a cane.\par \par 6/29/22: Here for follow up Chronic low back pain. Pain and symptoms persists. Needs refill on meds\par activity remains limited\par \par 8/10/22: Here for follow up Lspine. No change since last visit.  [] : Post Surgical Visit: no [FreeTextEntry1] : l spine  [de-identified] : none

## 2022-08-17 ENCOUNTER — FORM ENCOUNTER (OUTPATIENT)
Age: 57
End: 2022-08-17

## 2022-08-18 RX ORDER — HYDROCODONE BITARTRATE AND ACETAMINOPHEN 5; 300 MG/1; MG/1
5-300 TABLET ORAL
Qty: 50 | Refills: 0 | Status: ACTIVE | COMMUNITY
Start: 2022-04-06 | End: 1900-01-01

## 2022-09-14 ENCOUNTER — APPOINTMENT (OUTPATIENT)
Dept: ORTHOPEDIC SURGERY | Facility: CLINIC | Age: 57
End: 2022-09-14

## 2022-09-14 PROCEDURE — 99214 OFFICE O/P EST MOD 30 MIN: CPT

## 2022-09-14 PROCEDURE — 99072 ADDL SUPL MATRL&STAF TM PHE: CPT

## 2022-09-14 NOTE — REASON FOR VISIT
Chris is here today for   Chief Complaint   Patient presents with   • Lesion     noted left abdomen belt level irritated by clothing getting a bit bigger   .        Medication Refills needed today?  NO,   if you receive a prescription today would you like it to be sent to [facility]? NO      Patient would like communication of their results via:      Cell Phone:   Telephone Information:   Mobile [phone]     Okay to leave a message containing results? Yes          Health Maintenance Summary     Topic Due On Due Status Completed On    Colorectal Cancer Screening - Colonoscopy Dec 3, 2017 Not Due Dec 3, 2007    Immunization-Zoster Nov 18, 2007 Overdue     Immunization - Pneumococcal Nov 18, 2012 Overdue     Medicare Wellness Visit Nov 18, 2012 Overdue     IMMUNIZATION - DTaP/Tdap/Td Nov 18, 1966 Overdue     Immunization-Influenza Sep 1, 2017 Not Due Oct 25, 2010      Hepatitis C Screening    Patient is due for topics as listed above. Discussed topic(s) with patient.    Shingles vaccine is not covered by medicare in the office, but it is covered at the pharmacy under the medicare D prescription plan. Vaccine will be postponed at this time.     he wishes to decline hepatitis C screening  Tdap and pneumonia vaccine at this time .           [FreeTextEntry2] : Follow up for a schedule loss of use visit for a workers comp case for the L spine

## 2022-09-14 NOTE — DISCUSSION/SUMMARY
[Medication Risks Reviewed] : Medication risks reviewed [de-identified] : Chronic opiate medication use discussed. Will continue to wean off the medication appropriately. Will add cyclobenzaprine 5mg QHS. F/U 6 weeks

## 2022-09-14 NOTE — HISTORY OF PRESENT ILLNESS
[Lower back] : lower back [Burning] : burning [Shooting] : shooting [Stabbing] : stabbing [Intermittent] : intermittent [Meds] : meds [Standing] : standing [Walking] : walking [Not working due to injury] : Work status: not working due to injury [de-identified] :  DOI 9/25/22\par \par 5/18/22; Here for follow-up on chronic pain to the low back. Pain shoots down legs; no n/t. No new onset weakness or loss of b/b control. He has been doing home exercise and stretching. He reports no side effects to the medication. He is requesting a renewal on medications. He is using a cane.\par \par 6/29/22: Here for follow up Chronic low back pain. Pain and symptoms persists. Needs refill on meds\par activity remains limited\par \par 8/10/22: Here for follow up Lspine. No change since last visit. \par \par 09/14/22: Here for fu on the low back; pain to the low back persists and is constant.  Pain radiating down both legs. He uses a cane. He is comfortable weaning off the Vicodin.  [] : Post Surgical Visit: no [FreeTextEntry1] : l spine  [de-identified] : none

## 2022-10-26 ENCOUNTER — APPOINTMENT (OUTPATIENT)
Dept: ORTHOPEDIC SURGERY | Facility: CLINIC | Age: 57
End: 2022-10-26

## 2022-10-26 PROCEDURE — 99214 OFFICE O/P EST MOD 30 MIN: CPT

## 2022-10-26 PROCEDURE — 99072 ADDL SUPL MATRL&STAF TM PHE: CPT

## 2022-10-26 NOTE — HISTORY OF PRESENT ILLNESS
[Lower back] : lower back [Burning] : burning [Shooting] : shooting [Stabbing] : stabbing [Intermittent] : intermittent [Meds] : meds [Standing] : standing [Walking] : walking [Not working due to injury] : Work status: not working due to injury [de-identified] :  DOI 9/25/22\par \par 5/18/22; Here for follow-up on chronic pain to the low back. Pain shoots down legs; no n/t. No new onset weakness or loss of b/b control. He has been doing home exercise and stretching. He reports no side effects to the medication. He is requesting a renewal on medications. He is using a cane.\par \par 6/29/22: Here for follow up Chronic low back pain. Pain and symptoms persists. Needs refill on meds\par activity remains limited\par \par 8/10/22: Here for follow up Lspine. No change since last visit. \par \par 09/14/22: Here for fu on the low back; pain to the low back persists and is constant.  Pain radiating down both legs. He uses a cane. He is comfortable weaning off the Vicodin. \par \par 10/26/22:  Here for fu on the low back; plan last visit was, " Chronic opiate medication use discussed. Will continue to wean off the medication appropriately. Will add cyclobenzaprine 5mg QHS. F/U 6 weeks." Overall doing well, he has weaned off the vicodin and is not taking it any longer. He finds the cyclobenzaprine does help a lot and has been taking this as needed. He reports no side effects to the medication. He feels better off the opiate medication.  [] : Post Surgical Visit: no [FreeTextEntry1] : l spine  [de-identified] : none

## 2022-10-26 NOTE — DISCUSSION/SUMMARY
[Medication Risks Reviewed] : Medication risks reviewed [de-identified] : He has weaned off the opiate medication appropriately and reports minimal side effects. Will renew cyclobenzaprine 5mg 1-2 tabs QHS - HEP encouraged - f/u 6 weeks.\par Dr. Godinez -\par The documentation recorded by the scribe accurately reflects the service I personally performed and the decisions made by me.\par

## 2022-11-30 NOTE — H&P PST ADULT - PSH
Detail Level: Detailed Patient Specific Counseling (Will Not Stick From Patient To Patient): Discontinue imiquimod cream at this time. \\nStart treatment as directed: mupirocin ointment mixed with hydrocortisone 2.5% cream, cephalexin as directed. \\nPatient stated understanding. Colostomy status  2018  H/O hernia repair  right inguinal 2005  Ileostomy status  Octo. 2018 partially reversal

## 2022-12-07 ENCOUNTER — APPOINTMENT (OUTPATIENT)
Dept: ORTHOPEDIC SURGERY | Facility: CLINIC | Age: 57
End: 2022-12-07

## 2022-12-07 PROCEDURE — 99214 OFFICE O/P EST MOD 30 MIN: CPT

## 2022-12-07 PROCEDURE — 99072 ADDL SUPL MATRL&STAF TM PHE: CPT

## 2022-12-07 NOTE — HISTORY OF PRESENT ILLNESS
[Lower back] : lower back [Burning] : burning [Shooting] : shooting [Stabbing] : stabbing [Intermittent] : intermittent [Meds] : meds [Standing] : standing [Walking] : walking [Not working due to injury] : Work status: not working due to injury [de-identified] :  DOI 9/25/22\par \par 5/18/22; Here for follow-up on chronic pain to the low back. Pain shoots down legs; no n/t. No new onset weakness or loss of b/b control. He has been doing home exercise and stretching. He reports no side effects to the medication. He is requesting a renewal on medications. He is using a cane.\par \par 6/29/22: Here for follow up Chronic low back pain. Pain and symptoms persists. Needs refill on meds\par activity remains limited\par \par 8/10/22: Here for follow up Lspine. No change since last visit. \par \par 09/14/22: Here for fu on the low back; pain to the low back persists and is constant.  Pain radiating down both legs. He uses a cane. He is comfortable weaning off the Vicodin. \par \par 10/26/22:  Here for fu on the low back; plan last visit was, " Chronic opiate medication use discussed. Will continue to wean off the medication appropriately. Will add cyclobenzaprine 5mg QHS. F/U 6 weeks." Overall doing well, he has weaned off the vicodin and is not taking it any longer. He finds the cyclobenzaprine does help a lot and has been taking this as needed. He reports no side effects to the medication. He feels better off the opiate medication. \par \par 12/7/22: Here for fu; plan last visit was, "Medication risks reviewed. He has weaned off the opiate medication appropriately and reports minimal side effects. Will renew cyclobenzaprine 5mg 1-2 tabs QHS - HEP encouraged - f/u 6 weeks." Overall doing about the same and is tolerating the cyclobenzaprine well. Pain still continues, and is worse in the AM. He has been ambulating with a cane.  [] : Post Surgical Visit: no [FreeTextEntry1] : l spine  [de-identified] : none

## 2022-12-07 NOTE — DISCUSSION/SUMMARY
[Medication Risks Reviewed] : Medication risks reviewed [de-identified] : HEP encouraged - Will renew cyclobenzaprine 5mg 1-2 tabs QHS - HEP encouraged - f/u 6 weeks.\par \par Patient seen by "Lida NIÑO" under the supervision of "Dr. Godinez"\par \par

## 2023-01-11 ENCOUNTER — APPOINTMENT (OUTPATIENT)
Dept: ORTHOPEDIC SURGERY | Facility: CLINIC | Age: 58
End: 2023-01-11
Payer: OTHER MISCELLANEOUS

## 2023-01-11 PROCEDURE — 99213 OFFICE O/P EST LOW 20 MIN: CPT

## 2023-01-11 PROCEDURE — ZZZZZ: CPT

## 2023-01-11 PROCEDURE — 99072 ADDL SUPL MATRL&STAF TM PHE: CPT

## 2023-01-11 PROCEDURE — 99214 OFFICE O/P EST MOD 30 MIN: CPT

## 2023-01-11 NOTE — HISTORY OF PRESENT ILLNESS
[Lower back] : lower back [Burning] : burning [Shooting] : shooting [Stabbing] : stabbing [Intermittent] : intermittent [Meds] : meds [Standing] : standing [Walking] : walking [Not working due to injury] : Work status: not working due to injury [de-identified] :  DOI 9/25/22\par \par 5/18/22; Here for follow-up on chronic pain to the low back. Pain shoots down legs; no n/t. No new onset weakness or loss of b/b control. He has been doing home exercise and stretching. He reports no side effects to the medication. He is requesting a renewal on medications. He is using a cane.\par \par 6/29/22: Here for follow up Chronic low back pain. Pain and symptoms persists. Needs refill on meds\par activity remains limited\par \par 8/10/22: Here for follow up Lspine. No change since last visit. \par \par 09/14/22: Here for fu on the low back; pain to the low back persists and is constant.  Pain radiating down both legs. He uses a cane. He is comfortable weaning off the Vicodin. \par \par 10/26/22:  Here for fu on the low back; plan last visit was, " Chronic opiate medication use discussed. Will continue to wean off the medication appropriately. Will add cyclobenzaprine 5mg QHS. F/U 6 weeks." Overall doing well, he has weaned off the vicodin and is not taking it any longer. He finds the cyclobenzaprine does help a lot and has been taking this as needed. He reports no side effects to the medication. He feels better off the opiate medication. \par \par 12/7/22: Here for fu; plan last visit was, "Medication risks reviewed. He has weaned off the opiate medication appropriately and reports minimal side effects. Will renew cyclobenzaprine 5mg 1-2 tabs QHS - HEP encouraged - f/u 6 weeks." Overall doing about the same and is tolerating the cyclobenzaprine well. Pain still continues, and is worse in the AM. He has been ambulating with a cane.\par \par 1/11/23: Here for fu; plan last visit was, " HEP encouraged - Will renew cyclobenzaprine 5mg 1-2 tabs QHS - HEP encouraged - f/u 6 weeks." Overall stable on the flexeril; pain continues to shoot down the legs and is worse in the AM. No new onset weakness or changes in bladder or bowel.  [] : Post Surgical Visit: no [FreeTextEntry1] : l spine  [de-identified] : none

## 2023-01-11 NOTE — DISCUSSION/SUMMARY
[Medication Risks Reviewed] : Medication risks reviewed [de-identified] : Will renew cyclobenzaprine 5mg 1-2 tabs QHS - HEP encouraged -  no change in disability - f/u 6 weeks.\par \par Patient seen by "Lida NIÑO" under the supervision of "Dr. Godinez"\par \par

## 2023-02-17 NOTE — ED ADULT NURSE NOTE - AS SC BRADEN NUTRITION
chest wall non-tender, breathing is unlabored without accessory muscle use, normal breath sounds
(4) excellent

## 2023-02-22 ENCOUNTER — APPOINTMENT (OUTPATIENT)
Dept: ORTHOPEDIC SURGERY | Facility: CLINIC | Age: 58
End: 2023-02-22
Payer: OTHER MISCELLANEOUS

## 2023-02-22 PROCEDURE — 99213 OFFICE O/P EST LOW 20 MIN: CPT

## 2023-02-22 PROCEDURE — 99072 ADDL SUPL MATRL&STAF TM PHE: CPT

## 2023-02-22 NOTE — DISCUSSION/SUMMARY
[Medication Risks Reviewed] : Medication risks reviewed [de-identified] : Will renew cyclobenzaprine 5mg 1-2 tabs QHS - HEP encouraged -  no change in disability - would continue to request this medication as he is using it in place of narcotic medication and it seems to help -  f/u 6 weeks.\par \par Patient seen by "Lida NIÑO" under the supervision of "Dr. Godinez"\par \par

## 2023-02-22 NOTE — HISTORY OF PRESENT ILLNESS
[Lower back] : lower back [Work related] : work related [Gradual] : gradual [6] : 6 [5] : 5 [Burning] : burning [Shooting] : shooting [Stabbing] : stabbing [Intermittent] : intermittent [Meds] : meds [Standing] : standing [Walking] : walking [Not working due to injury] : Work status: not working due to injury [de-identified] :  DOI 9/25/22\par \par 5/18/22; Here for follow-up on chronic pain to the low back. Pain shoots down legs; no n/t. No new onset weakness or loss of b/b control. He has been doing home exercise and stretching. He reports no side effects to the medication. He is requesting a renewal on medications. He is using a cane.\par \par 6/29/22: Here for follow up Chronic low back pain. Pain and symptoms persists. Needs refill on meds\par activity remains limited\par \par 8/10/22: Here for follow up Lspine. No change since last visit. \par \par 09/14/22: Here for fu on the low back; pain to the low back persists and is constant.  Pain radiating down both legs. He uses a cane. He is comfortable weaning off the Vicodin. \par \par 10/26/22:  Here for fu on the low back; plan last visit was, " Chronic opiate medication use discussed. Will continue to wean off the medication appropriately. Will add cyclobenzaprine 5mg QHS. F/U 6 weeks." Overall doing well, he has weaned off the vicodin and is not taking it any longer. He finds the cyclobenzaprine does help a lot and has been taking this as needed. He reports no side effects to the medication. He feels better off the opiate medication. \par \par 12/7/22: Here for fu; plan last visit was, "Medication risks reviewed. He has weaned off the opiate medication appropriately and reports minimal side effects. Will renew cyclobenzaprine 5mg 1-2 tabs QHS - HEP encouraged - f/u 6 weeks." Overall doing about the same and is tolerating the cyclobenzaprine well. Pain still continues, and is worse in the AM. He has been ambulating with a cane.\par \par 1/11/23: Here for fu; plan last visit was, " HEP encouraged - Will renew cyclobenzaprine 5mg 1-2 tabs QHS - HEP encouraged - f/u 6 weeks." Overall stable on the flexeril; pain continues to shoot down the legs and is worse in the AM. No new onset weakness or changes in bladder or bowel. \par \par 2/22/23: Here for fu; overall stable on the cyclobenzaprine and it helps to manage his pain. Pain continues to shoot down the legs. The cyclobenzaprine is keeping him off the narcotic medication.  [] : Post Surgical Visit: no [FreeTextEntry1] : l spine  [FreeTextEntry3] : 09/25/2015 [de-identified] : none

## 2023-04-12 ENCOUNTER — APPOINTMENT (OUTPATIENT)
Dept: ORTHOPEDIC SURGERY | Facility: CLINIC | Age: 58
End: 2023-04-12
Payer: OTHER MISCELLANEOUS

## 2023-04-12 PROCEDURE — 99213 OFFICE O/P EST LOW 20 MIN: CPT

## 2023-04-12 NOTE — WORK
[Was the competent medical cause of the injury] : was the competent medical cause of the injury [Consistent with my objective findings] : consistent with my objective findings [Does not reveal pre-existing condition(s) that may affect treatment/prognosis] : does not reveal pre-existing condition(s) that may affect treatment/prognosis [Cannot return to work because ________] : cannot return to work because [unfilled]

## 2023-04-12 NOTE — HISTORY OF PRESENT ILLNESS
[Lower back] : lower back [Work related] : work related [Gradual] : gradual [6] : 6 [5] : 5 [Burning] : burning [Shooting] : shooting [Stabbing] : stabbing [Intermittent] : intermittent [Meds] : meds [Standing] : standing [Walking] : walking [Not working due to injury] : Work status: not working due to injury [de-identified] :  DOI 9/25/22\par \par 5/18/22; Here for follow-up on chronic pain to the low back. Pain shoots down legs; no n/t. No new onset weakness or loss of b/b control. He has been doing home exercise and stretching. He reports no side effects to the medication. He is requesting a renewal on medications. He is using a cane.\par \par 6/29/22: Here for follow up Chronic low back pain. Pain and symptoms persists. Needs refill on meds\par activity remains limited\par \par 8/10/22: Here for follow up Lspine. No change since last visit. \par \par 09/14/22: Here for fu on the low back; pain to the low back persists and is constant.  Pain radiating down both legs. He uses a cane. He is comfortable weaning off the Vicodin. \par \par 10/26/22:  Here for fu on the low back; plan last visit was, " Chronic opiate medication use discussed. Will continue to wean off the medication appropriately. Will add cyclobenzaprine 5mg QHS. F/U 6 weeks." Overall doing well, he has weaned off the vicodin and is not taking it any longer. He finds the cyclobenzaprine does help a lot and has been taking this as needed. He reports no side effects to the medication. He feels better off the opiate medication. \par \par 12/7/22: Here for fu; plan last visit was, "Medication risks reviewed. He has weaned off the opiate medication appropriately and reports minimal side effects. Will renew cyclobenzaprine 5mg 1-2 tabs QHS - HEP encouraged - f/u 6 weeks." Overall doing about the same and is tolerating the cyclobenzaprine well. Pain still continues, and is worse in the AM. He has been ambulating with a cane.\par \par 1/11/23: Here for fu; plan last visit was, " HEP encouraged - Will renew cyclobenzaprine 5mg 1-2 tabs QHS - HEP encouraged - f/u 6 weeks." Overall stable on the flexeril; pain continues to shoot down the legs and is worse in the AM. No new onset weakness or changes in bladder or bowel. \par \par 2/22/23: Here for fu; overall stable on the cyclobenzaprine and it helps to manage his pain. Pain continues to shoot down the legs. The cyclobenzaprine is keeping him off the narcotic medication.\par \par 4/12/23: Here for f/up. Plan at last "Will renew cyclobenzaprine 5mg 1-2 tabs QHS - HEP encouraged -  no change in disability - would continue to request this medication as he is using it in place of narcotic medication and it seems to help -  f/u 6 weeks." No sig changes since last visit. Cyclobenzaprine continues to be beneficial  [] : Post Surgical Visit: no [FreeTextEntry1] : l spine  [FreeTextEntry3] : 09/25/2015 [de-identified] : none

## 2023-04-12 NOTE — DISCUSSION/SUMMARY
[Medication Risks Reviewed] : Medication risks reviewed [de-identified] : Will renew cyclobenzaprine 5mg 1-2 tabs QHS - HEP encouraged -  no change in disability - would continue to request this medication as he is using it in place of narcotic medication and it seems to help -  f/u 6 weeks.\par \par Patient seen by "Melody NIÑO" under the supervision of "Dr. Godinez"

## 2023-05-11 ENCOUNTER — NON-APPOINTMENT (OUTPATIENT)
Age: 58
End: 2023-05-11

## 2023-05-24 ENCOUNTER — APPOINTMENT (OUTPATIENT)
Dept: ORTHOPEDIC SURGERY | Facility: CLINIC | Age: 58
End: 2023-05-24
Payer: OTHER MISCELLANEOUS

## 2023-05-24 VITALS — WEIGHT: 225 LBS | HEIGHT: 68 IN | BODY MASS INDEX: 34.1 KG/M2

## 2023-05-24 PROCEDURE — 99214 OFFICE O/P EST MOD 30 MIN: CPT

## 2023-05-24 NOTE — DISCUSSION/SUMMARY
[Medication Risks Reviewed] : Medication risks reviewed [de-identified] : Will renew cyclobenzaprine 5mg 1-2 tabs QHS - HEP encouraged -  no change in disability - would continue to request this medication as he is using it in place of narcotic medication and it seems to help - he uses this for acute flare ups of pain when it occurs. f/u 6 weeks.\par \par Patient seen by "Lida NIÑO" under the supervision of "Dr. Julio Godinez"\par \par

## 2023-05-24 NOTE — HISTORY OF PRESENT ILLNESS
[Lower back] : lower back [Work related] : work related [Gradual] : gradual [6] : 6 [5] : 5 [Burning] : burning [Shooting] : shooting [Stabbing] : stabbing [Intermittent] : intermittent [Meds] : meds [Standing] : standing [Walking] : walking [Not working due to injury] : Work status: not working due to injury [de-identified] :  DOI 9/25/22\par \par 5/18/22; Here for follow-up on chronic pain to the low back. Pain shoots down legs; no n/t. No new onset weakness or loss of b/b control. He has been doing home exercise and stretching. He reports no side effects to the medication. He is requesting a renewal on medications. He is using a cane.\par \par 6/29/22: Here for follow up Chronic low back pain. Pain and symptoms persists. Needs refill on meds\par activity remains limited\par \par 8/10/22: Here for follow up Lspine. No change since last visit. \par \par 09/14/22: Here for fu on the low back; pain to the low back persists and is constant.  Pain radiating down both legs. He uses a cane. He is comfortable weaning off the Vicodin. \par \par 10/26/22:  Here for fu on the low back; plan last visit was, " Chronic opiate medication use discussed. Will continue to wean off the medication appropriately. Will add cyclobenzaprine 5mg QHS. F/U 6 weeks." Overall doing well, he has weaned off the vicodin and is not taking it any longer. He finds the cyclobenzaprine does help a lot and has been taking this as needed. He reports no side effects to the medication. He feels better off the opiate medication. \par \par 12/7/22: Here for fu; plan last visit was, "Medication risks reviewed. He has weaned off the opiate medication appropriately and reports minimal side effects. Will renew cyclobenzaprine 5mg 1-2 tabs QHS - HEP encouraged - f/u 6 weeks." Overall doing about the same and is tolerating the cyclobenzaprine well. Pain still continues, and is worse in the AM. He has been ambulating with a cane.\par \par 1/11/23: Here for fu; plan last visit was, " HEP encouraged - Will renew cyclobenzaprine 5mg 1-2 tabs QHS - HEP encouraged - f/u 6 weeks." Overall stable on the flexeril; pain continues to shoot down the legs and is worse in the AM. No new onset weakness or changes in bladder or bowel. \par \par 2/22/23: Here for fu; overall stable on the cyclobenzaprine and it helps to manage his pain. Pain continues to shoot down the legs. The cyclobenzaprine is keeping him off the narcotic medication.\par \par 4/12/23: Here for f/up. Plan at last "Will renew cyclobenzaprine 5mg 1-2 tabs QHS - HEP encouraged -  no change in disability - would continue to request this medication as he is using it in place of narcotic medication and it seems to help -  f/u 6 weeks." No sig changes since last visit. Cyclobenzaprine continues to be beneficial \par \par 5/24/2023; Here for fu in the lower back; continued pain to the lower back. No new onset weakness or loss of b/b control. He takes the flexeril for intermittent acute flare ups of pain which helps. He has been ambulating with a cane.  [] : Post Surgical Visit: no [FreeTextEntry1] : l spine  [FreeTextEntry3] : 09/25/2015 [de-identified] : none

## 2023-05-24 NOTE — WORK
[Was the competent medical cause of the injury] : was the competent medical cause of the injury [Consistent with my objective findings] : consistent with my objective findings [Does not reveal pre-existing condition(s) that may affect treatment/prognosis] : does not reveal pre-existing condition(s) that may affect treatment/prognosis [Cannot return to work because ________] : cannot return to work because [unfilled] [Total (100%)] : total (100%) [Cane] : cane

## 2023-07-05 ENCOUNTER — APPOINTMENT (OUTPATIENT)
Dept: ORTHOPEDIC SURGERY | Facility: CLINIC | Age: 58
End: 2023-07-05
Payer: OTHER MISCELLANEOUS

## 2023-07-05 PROCEDURE — 99214 OFFICE O/P EST MOD 30 MIN: CPT

## 2023-07-05 PROCEDURE — 72170 X-RAY EXAM OF PELVIS: CPT

## 2023-07-05 PROCEDURE — 72110 X-RAY EXAM L-2 SPINE 4/>VWS: CPT

## 2023-07-05 NOTE — HISTORY OF PRESENT ILLNESS
[Lower back] : lower back [Work related] : work related [Gradual] : gradual [6] : 6 [5] : 5 [Burning] : burning [Shooting] : shooting [Stabbing] : stabbing [Intermittent] : intermittent [Meds] : meds [Standing] : standing [Walking] : walking [Not working due to injury] : Work status: not working due to injury [de-identified] :  DOI 9/25/22\par \par 5/18/22; Here for follow-up on chronic pain to the low back. Pain shoots down legs; no n/t. No new onset weakness or loss of b/b control. He has been doing home exercise and stretching. He reports no side effects to the medication. He is requesting a renewal on medications. He is using a cane.\par \par 6/29/22: Here for follow up Chronic low back pain. Pain and symptoms persists. Needs refill on meds\par activity remains limited\par \par 8/10/22: Here for follow up Lspine. No change since last visit. \par \par 09/14/22: Here for fu on the low back; pain to the low back persists and is constant.  Pain radiating down both legs. He uses a cane. He is comfortable weaning off the Vicodin. \par \par 10/26/22:  Here for fu on the low back; plan last visit was, " Chronic opiate medication use discussed. Will continue to wean off the medication appropriately. Will add cyclobenzaprine 5mg QHS. F/U 6 weeks." Overall doing well, he has weaned off the vicodin and is not taking it any longer. He finds the cyclobenzaprine does help a lot and has been taking this as needed. He reports no side effects to the medication. He feels better off the opiate medication. \par \par 12/7/22: Here for fu; plan last visit was, "Medication risks reviewed. He has weaned off the opiate medication appropriately and reports minimal side effects. Will renew cyclobenzaprine 5mg 1-2 tabs QHS - HEP encouraged - f/u 6 weeks." Overall doing about the same and is tolerating the cyclobenzaprine well. Pain still continues, and is worse in the AM. He has been ambulating with a cane.\par \par 1/11/23: Here for fu; plan last visit was, " HEP encouraged - Will renew cyclobenzaprine 5mg 1-2 tabs QHS - HEP encouraged - f/u 6 weeks." Overall stable on the flexeril; pain continues to shoot down the legs and is worse in the AM. No new onset weakness or changes in bladder or bowel. \par \par 2/22/23: Here for fu; overall stable on the cyclobenzaprine and it helps to manage his pain. Pain continues to shoot down the legs. The cyclobenzaprine is keeping him off the narcotic medication.\par \par 4/12/23: Here for f/up. Plan at last "Will renew cyclobenzaprine 5mg 1-2 tabs QHS - HEP encouraged -  no change in disability - would continue to request this medication as he is using it in place of narcotic medication and it seems to help -  f/u 6 weeks." No sig changes since last visit. Cyclobenzaprine continues to be beneficial \par \par 5/24/2023; Here for fu in the lower back; continued pain to the lower back. No new onset weakness or loss of b/b control. He takes the flexeril for intermittent acute flare ups of pain which helps. He has been ambulating with a cane. \par \par 7/5/23: Here for fu -pain back pain remains - shooting down the both legs - USING CANE-  medicatoin was not filled recently (unclear why not) \par \par xrays today:\par l spine - scoliosis 24 degrees, spondylosis\par AP PELVIS - negative  [] : Post Surgical Visit: no [FreeTextEntry1] : l spine  [FreeTextEntry3] : 09/25/2015 [de-identified] : none

## 2023-07-05 NOTE — DISCUSSION/SUMMARY
[Medication Risks Reviewed] : Medication risks reviewed [de-identified] : reviewed the case and the imaging with him\par no change in disability \par Will renew cyclobenzaprine 5mg 1-2 tabs QHS - HEP encouraged -  - would continue to request this medication as he is using it in place of narcotic medication and it seems to help -\par  he uses this for acute flare ups of pain when it occurs. f/u 6 weeks.\par \par \par

## 2023-07-05 NOTE — WORK
[Was the competent medical cause of the injury] : was the competent medical cause of the injury [Consistent with my objective findings] : consistent with my objective findings [Total (100%)] : total (100%) [Does not reveal pre-existing condition(s) that may affect treatment/prognosis] : does not reveal pre-existing condition(s) that may affect treatment/prognosis [Cane] : cane [Cannot return to work because ________] : cannot return to work because [unfilled]

## 2023-08-23 ENCOUNTER — APPOINTMENT (OUTPATIENT)
Dept: ORTHOPEDIC SURGERY | Facility: CLINIC | Age: 58
End: 2023-08-23
Payer: OTHER MISCELLANEOUS

## 2023-08-23 PROCEDURE — 99214 OFFICE O/P EST MOD 30 MIN: CPT

## 2023-08-23 NOTE — HISTORY OF PRESENT ILLNESS
[Lower back] : lower back [Work related] : work related [Gradual] : gradual [6] : 6 [5] : 5 [Burning] : burning [Shooting] : shooting [Stabbing] : stabbing [Intermittent] : intermittent [Meds] : meds [Standing] : standing [Walking] : walking [Not working due to injury] : Work status: not working due to injury [de-identified] : WC DOI 9/25/22 5/18/22; Here for follow-up on chronic pain to the low back. Pain shoots down legs; no n/t. No new onset weakness or loss of b/b control. He has been doing home exercise and stretching. He reports no side effects to the medication. He is requesting a renewal on medications. He is using a cane.  6/29/22: Here for follow up Chronic low back pain. Pain and symptoms persists. Needs refill on meds activity remains limited  8/10/22: Here for follow up Lspine. No change since last visit.   09/14/22: Here for fu on the low back; pain to the low back persists and is constant.  Pain radiating down both legs. He uses a cane. He is comfortable weaning off the Vicodin.   10/26/22:  Here for fu on the low back; plan last visit was, " Chronic opiate medication use discussed. Will continue to wean off the medication appropriately. Will add cyclobenzaprine 5mg QHS. F/U 6 weeks." Overall doing well, he has weaned off the vicodin and is not taking it any longer. He finds the cyclobenzaprine does help a lot and has been taking this as needed. He reports no side effects to the medication. He feels better off the opiate medication.   12/7/22: Here for fu; plan last visit was, "Medication risks reviewed. He has weaned off the opiate medication appropriately and reports minimal side effects. Will renew cyclobenzaprine 5mg 1-2 tabs QHS - HEP encouraged - f/u 6 weeks." Overall doing about the same and is tolerating the cyclobenzaprine well. Pain still continues, and is worse in the AM. He has been ambulating with a cane.  1/11/23: Here for fu; plan last visit was, " HEP encouraged - Will renew cyclobenzaprine 5mg 1-2 tabs QHS - HEP encouraged - f/u 6 weeks." Overall stable on the flexeril; pain continues to shoot down the legs and is worse in the AM. No new onset weakness or changes in bladder or bowel.   2/22/23: Here for fu; overall stable on the cyclobenzaprine and it helps to manage his pain. Pain continues to shoot down the legs. The cyclobenzaprine is keeping him off the narcotic medication.  4/12/23: Here for f/up. Plan at last "Will renew cyclobenzaprine 5mg 1-2 tabs QHS - HEP encouraged -  no change in disability - would continue to request this medication as he is using it in place of narcotic medication and it seems to help -  f/u 6 weeks." No sig changes since last visit. Cyclobenzaprine continues to be beneficial   5/24/2023; Here for fu in the lower back; continued pain to the lower back. No new onset weakness or loss of b/b control. He takes the flexeril for intermittent acute flare ups of pain which helps. He has been ambulating with a cane.   7/5/23: Here for fu -pain back pain remains - shooting down the both legs - USING CANE-  medicatoin was not filled recently (unclear why not)   xrays today: l spine - scoliosis 24 degrees, spondylosis AP PELVIS - negative   08/23/23  follow up workers comp lower spine; overall doing about the same; he reports no changes. He continues to take the flexeril for acute flare ups of pain with some relief. He is ambulating with a cane.   [] : Post Surgical Visit: no [FreeTextEntry1] : l spine  [FreeTextEntry3] : 09/25/2015 [de-identified] : none

## 2023-08-23 NOTE — DISCUSSION/SUMMARY
[de-identified] : no change in disability  continue use of cane Will renew cyclobenzaprine 5mg 1-2 tabs QHS - HEP encouraged -  - would continue to request this medication as he is using it in place of narcotic medication and it seems to help   he uses this for acute flare ups of pain when it occurs.  f/u 6 weeks.  Patient seen by "Lida NIÑO" under the supervision of "Dr. Julio Godinez"

## 2023-10-04 ENCOUNTER — APPOINTMENT (OUTPATIENT)
Dept: ORTHOPEDIC SURGERY | Facility: CLINIC | Age: 58
End: 2023-10-04
Payer: OTHER MISCELLANEOUS

## 2023-10-04 PROCEDURE — 99213 OFFICE O/P EST LOW 20 MIN: CPT

## 2023-11-15 ENCOUNTER — APPOINTMENT (OUTPATIENT)
Dept: ORTHOPEDIC SURGERY | Facility: CLINIC | Age: 58
End: 2023-11-15
Payer: SELF-PAY

## 2023-11-15 VITALS — HEIGHT: 68 IN | WEIGHT: 225 LBS | BODY MASS INDEX: 34.1 KG/M2

## 2023-11-15 PROCEDURE — 99213 OFFICE O/P EST LOW 20 MIN: CPT

## 2024-02-14 ENCOUNTER — APPOINTMENT (OUTPATIENT)
Dept: ORTHOPEDIC SURGERY | Facility: CLINIC | Age: 59
End: 2024-02-14

## 2024-02-19 ENCOUNTER — RX RENEWAL (OUTPATIENT)
Age: 59
End: 2024-02-19

## 2024-02-21 ENCOUNTER — APPOINTMENT (OUTPATIENT)
Dept: ORTHOPEDIC SURGERY | Facility: CLINIC | Age: 59
End: 2024-02-21
Payer: MEDICARE

## 2024-02-21 DIAGNOSIS — M54.42 LUMBAGO WITH SCIATICA, LEFT SIDE: ICD-10-CM

## 2024-02-21 DIAGNOSIS — G89.29 LUMBAGO WITH SCIATICA, LEFT SIDE: ICD-10-CM

## 2024-02-21 DIAGNOSIS — M54.41 LUMBAGO WITH SCIATICA, LEFT SIDE: ICD-10-CM

## 2024-02-21 PROCEDURE — 99213 OFFICE O/P EST LOW 20 MIN: CPT

## 2024-02-21 NOTE — HISTORY OF PRESENT ILLNESS
[Lower back] : lower back [Work related] : work related [Gradual] : gradual [6] : 6 [5] : 5 [Burning] : burning [Shooting] : shooting [Stabbing] : stabbing [Intermittent] : intermittent [Household chores] : household chores [Rest] : rest [Meds] : meds [Standing] : standing [Walking] : walking [Not working due to injury] : Work status: not working due to injury [] : This patient has had an injection before: yes

## 2024-05-15 ENCOUNTER — APPOINTMENT (OUTPATIENT)
Dept: ORTHOPEDIC SURGERY | Facility: CLINIC | Age: 59
End: 2024-05-15
Payer: SELF-PAY

## 2024-05-15 VITALS — BODY MASS INDEX: 34.1 KG/M2 | WEIGHT: 225 LBS | HEIGHT: 68 IN

## 2024-05-15 DIAGNOSIS — M51.16 INTERVERTEBRAL DISC DISORDERS WITH RADICULOPATHY, LUMBAR REGION: ICD-10-CM

## 2024-05-15 DIAGNOSIS — M51.26 OTHER INTERVERTEBRAL DISC DISPLACEMENT, LUMBAR REGION: ICD-10-CM

## 2024-05-15 DIAGNOSIS — M47.816 SPONDYLOSIS W/OUT MYELOPATHY OR RADICULOPATHY, LUMBAR REGION: ICD-10-CM

## 2024-05-15 PROCEDURE — 99213 OFFICE O/P EST LOW 20 MIN: CPT

## 2024-05-15 RX ORDER — CYCLOBENZAPRINE HYDROCHLORIDE 5 MG/1
5 TABLET, FILM COATED ORAL
Qty: 60 | Refills: 2 | Status: ACTIVE | COMMUNITY
Start: 2022-09-14 | End: 1900-01-01

## 2024-05-15 NOTE — DISCUSSION/SUMMARY
[Medication Risks Reviewed] : Medication risks reviewed [de-identified] : no change in disability  continue use of cane Will renew cyclobenzaprine 5mg 1-2 tabs QHS 2 RF  HEP encouraged - would continue to request this medication as he is using it in place of narcotic medication and it seems to help  he uses this for acute flare ups of pain when it occurs.   f/u 3 months

## 2024-05-15 NOTE — HISTORY OF PRESENT ILLNESS
[Lower back] : lower back [Work related] : work related [Gradual] : gradual [6] : 6 [5] : 5 [Burning] : burning [Shooting] : shooting [Stabbing] : stabbing [Intermittent] : intermittent [Household chores] : household chores [Rest] : rest [Meds] : meds [Standing] : standing [Walking] : walking [Not working due to injury] : Work status: not working due to injury [de-identified] : DOI 9/25/22 5/18/22; Here for follow-up on chronic pain to the low back. Pain shoots down legs; no n/t. No new onset weakness or loss of b/b control. He has been doing home exercise and stretching. He reports no side effects to the medication. He is requesting a renewal on medications. He is using a cane.  6/29/22: Here for follow up Chronic low back pain. Pain and symptoms persists. Needs refill on meds activity remains limited  8/10/22: Here for follow up Lspine. No change since last visit.   09/14/22: Here for fu on the low back; pain to the low back persists and is constant.  Pain radiating down both legs. He uses a cane. He is comfortable weaning off the Vicodin.   10/26/22:  Here for fu on the low back; plan last visit was, " Chronic opiate medication use discussed. Will continue to wean off the medication appropriately. Will add cyclobenzaprine 5mg QHS. F/U 6 weeks." Overall doing well, he has weaned off the vicodin and is not taking it any longer. He finds the cyclobenzaprine does help a lot and has been taking this as needed. He reports no side effects to the medication. He feels better off the opiate medication.   12/7/22: Here for fu; plan last visit was, "Medication risks reviewed. He has weaned off the opiate medication appropriately and reports minimal side effects. Will renew cyclobenzaprine 5mg 1-2 tabs QHS - HEP encouraged - f/u 6 weeks." Overall doing about the same and is tolerating the cyclobenzaprine well. Pain still continues, and is worse in the AM. He has been ambulating with a cane.  1/11/23: Here for fu; plan last visit was, " HEP encouraged - Will renew cyclobenzaprine 5mg 1-2 tabs QHS - HEP encouraged - f/u 6 weeks." Overall stable on the flexeril; pain continues to shoot down the legs and is worse in the AM. No new onset weakness or changes in bladder or bowel.   2/22/23: Here for fu; overall stable on the cyclobenzaprine and it helps to manage his pain. Pain continues to shoot down the legs. The cyclobenzaprine is keeping him off the narcotic medication.  4/12/23: Here for f/up. Plan at last "Will renew cyclobenzaprine 5mg 1-2 tabs QHS - HEP encouraged -  no change in disability - would continue to request this medication as he is using it in place of narcotic medication and it seems to help -  f/u 6 weeks." No sig changes since last visit. Cyclobenzaprine continues to be beneficial   5/24/2023; Here for fu in the lower back; continued pain to the lower back. No new onset weakness or loss of b/b control. He takes the flexeril for intermittent acute flare ups of pain which helps. He has been ambulating with a cane.   7/5/23: Here for fu -pain back pain remains - shooting down the both legs - USING CANE-  medicatoin was not filled recently (unclear why not)   xrays today: l spine - scoliosis 24 degrees, spondylosis AP PELVIS - negative   08/23/23  follow up workers comp lower spine; overall doing about the same; he reports no changes. He continues to take the flexeril for acute flare ups of pain with some relief. He is ambulating with a cane.   10/4/23- Lower back pain the same. Radiation down BLEs.  Denies new-onset weakness or b/b dysfunction. Continues to use cane.   11/15/23: Here for fu on the lower back; overall is still with pain; pain can radiate down both legs at times. No new onset weakness. He continues to use the cane.   2/21/24: Here for follow-up on the lower back; overall still with pain. Pain is to the right lower/mid back. He takes the muscle relaxers for exacerbations of pain which occur about every other day. Pain is dependent on his activity. He is using a cane.   5.15.24 Patient here for lower back pain. no changes since last visit Pain in the back and down the legs  using cane  worse with activity  taking the medicatoin without relief   xrays reviewed: l spine - scoliosis 24 degrees, spondylosis AP PELVIS - negative   [] : Post Surgical Visit: no [FreeTextEntry1] : l spine  [FreeTextEntry3] : 09/25/2015 [de-identified] : none

## 2024-05-30 NOTE — PATIENT PROFILE ADULT - NSSTREETDRUGUSE_GEN_A_NUR
Detail Level: Detailed
Add 99473 Cpt? (Important Note: In 2017 The Use Of 28461 Is Being Tracked By Cms To Determine Future Global Period Reimbursement For Global Periods): no
never used

## 2024-08-14 ENCOUNTER — APPOINTMENT (OUTPATIENT)
Dept: ORTHOPEDIC SURGERY | Facility: CLINIC | Age: 59
End: 2024-08-14
Payer: SELF-PAY

## 2024-08-14 DIAGNOSIS — M51.16 INTERVERTEBRAL DISC DISORDERS WITH RADICULOPATHY, LUMBAR REGION: ICD-10-CM

## 2024-08-14 DIAGNOSIS — M51.26 OTHER INTERVERTEBRAL DISC DISPLACEMENT, LUMBAR REGION: ICD-10-CM

## 2024-08-14 DIAGNOSIS — M47.816 SPONDYLOSIS W/OUT MYELOPATHY OR RADICULOPATHY, LUMBAR REGION: ICD-10-CM

## 2024-08-14 PROCEDURE — 99213 OFFICE O/P EST LOW 20 MIN: CPT

## 2024-08-15 NOTE — HISTORY OF PRESENT ILLNESS
[Lower back] : lower back [Work related] : work related [Gradual] : gradual [6] : 6 [5] : 5 [Burning] : burning [Shooting] : shooting [Stabbing] : stabbing [Intermittent] : intermittent [Household chores] : household chores [Rest] : rest [Meds] : meds [Standing] : standing [Walking] : walking [Not working due to injury] : Work status: not working due to injury [de-identified] : DOI 9/25/22 5/18/22; Here for follow-up on chronic pain to the low back. Pain shoots down legs; no n/t. No new onset weakness or loss of b/b control. He has been doing home exercise and stretching. He reports no side effects to the medication. He is requesting a renewal on medications. He is using a cane.  6/29/22: Here for follow up Chronic low back pain. Pain and symptoms persists. Needs refill on meds activity remains limited  8/10/22: Here for follow up Lspine. No change since last visit.   09/14/22: Here for fu on the low back; pain to the low back persists and is constant.  Pain radiating down both legs. He uses a cane. He is comfortable weaning off the Vicodin.   10/26/22:  Here for fu on the low back; plan last visit was, " Chronic opiate medication use discussed. Will continue to wean off the medication appropriately. Will add cyclobenzaprine 5mg QHS. F/U 6 weeks." Overall doing well, he has weaned off the vicodin and is not taking it any longer. He finds the cyclobenzaprine does help a lot and has been taking this as needed. He reports no side effects to the medication. He feels better off the opiate medication.   12/7/22: Here for fu; plan last visit was, "Medication risks reviewed. He has weaned off the opiate medication appropriately and reports minimal side effects. Will renew cyclobenzaprine 5mg 1-2 tabs QHS - HEP encouraged - f/u 6 weeks." Overall doing about the same and is tolerating the cyclobenzaprine well. Pain still continues, and is worse in the AM. He has been ambulating with a cane.  1/11/23: Here for fu; plan last visit was, " HEP encouraged - Will renew cyclobenzaprine 5mg 1-2 tabs QHS - HEP encouraged - f/u 6 weeks." Overall stable on the flexeril; pain continues to shoot down the legs and is worse in the AM. No new onset weakness or changes in bladder or bowel.   2/22/23: Here for fu; overall stable on the cyclobenzaprine and it helps to manage his pain. Pain continues to shoot down the legs. The cyclobenzaprine is keeping him off the narcotic medication.  4/12/23: Here for f/up. Plan at last "Will renew cyclobenzaprine 5mg 1-2 tabs QHS - HEP encouraged -  no change in disability - would continue to request this medication as he is using it in place of narcotic medication and it seems to help -  f/u 6 weeks." No sig changes since last visit. Cyclobenzaprine continues to be beneficial   5/24/2023; Here for fu in the lower back; continued pain to the lower back. No new onset weakness or loss of b/b control. He takes the flexeril for intermittent acute flare ups of pain which helps. He has been ambulating with a cane.   7/5/23: Here for fu -pain back pain remains - shooting down the both legs - USING CANE-  medicatoin was not filled recently (unclear why not)   xrays today: l spine - scoliosis 24 degrees, spondylosis AP PELVIS - negative   08/23/23  follow up workers comp lower spine; overall doing about the same; he reports no changes. He continues to take the flexeril for acute flare ups of pain with some relief. He is ambulating with a cane.   10/4/23- Lower back pain the same. Radiation down BLEs.  Denies new-onset weakness or b/b dysfunction. Continues to use cane.   11/15/23: Here for fu on the lower back; overall is still with pain; pain can radiate down both legs at times. No new onset weakness. He continues to use the cane.   2/21/24: Here for follow-up on the lower back; overall still with pain. Pain is to the right lower/mid back. He takes the muscle relaxers for exacerbations of pain which occur about every other day. Pain is dependent on his activity. He is using a cane.   5.15.24 Patient here for lower back pain. no changes since last visit Pain in the back and down the legs  using cane  worse with activity  taking the medicatoin without relief   xrays reviewed: l spine - scoliosis 24 degrees, spondylosis AP PELVIS - negative   8.14.24 Patient here for back pain. He reports continued pain. He reports pain to the midback that radiates into both legs to the level of the knees. The pain is dependent on his activity levels. No new onset weakess or loss of b/b control. He takes the cyclobenzaprine for exacerbations of pain as needed. He reports no side effects. He is using a cane.  [] : Post Surgical Visit: no [FreeTextEntry1] : l spine  [FreeTextEntry3] : 09/25/2015 [de-identified] : none

## 2024-08-15 NOTE — DISCUSSION/SUMMARY
[Medication Risks Reviewed] : Medication risks reviewed [de-identified] : no change in disability  continue use of cane Will renew cyclobenzaprine 5mg 1-2 tabs QHS 2 RF  HEP encouraged - would continue to request this medication as he is using it in place of narcotic medication and it seems to help  he uses this for acute flare ups of pain when it occurs.   f/u 3-6 months

## 2024-08-15 NOTE — HISTORY OF PRESENT ILLNESS
[Lower back] : lower back [Work related] : work related [Gradual] : gradual [6] : 6 [5] : 5 [Burning] : burning [Shooting] : shooting [Stabbing] : stabbing [Intermittent] : intermittent [Household chores] : household chores [Rest] : rest [Meds] : meds [Standing] : standing [Walking] : walking [Not working due to injury] : Work status: not working due to injury [de-identified] : DOI 9/25/22 5/18/22; Here for follow-up on chronic pain to the low back. Pain shoots down legs; no n/t. No new onset weakness or loss of b/b control. He has been doing home exercise and stretching. He reports no side effects to the medication. He is requesting a renewal on medications. He is using a cane.  6/29/22: Here for follow up Chronic low back pain. Pain and symptoms persists. Needs refill on meds activity remains limited  8/10/22: Here for follow up Lspine. No change since last visit.   09/14/22: Here for fu on the low back; pain to the low back persists and is constant.  Pain radiating down both legs. He uses a cane. He is comfortable weaning off the Vicodin.   10/26/22:  Here for fu on the low back; plan last visit was, " Chronic opiate medication use discussed. Will continue to wean off the medication appropriately. Will add cyclobenzaprine 5mg QHS. F/U 6 weeks." Overall doing well, he has weaned off the vicodin and is not taking it any longer. He finds the cyclobenzaprine does help a lot and has been taking this as needed. He reports no side effects to the medication. He feels better off the opiate medication.   12/7/22: Here for fu; plan last visit was, "Medication risks reviewed. He has weaned off the opiate medication appropriately and reports minimal side effects. Will renew cyclobenzaprine 5mg 1-2 tabs QHS - HEP encouraged - f/u 6 weeks." Overall doing about the same and is tolerating the cyclobenzaprine well. Pain still continues, and is worse in the AM. He has been ambulating with a cane.  1/11/23: Here for fu; plan last visit was, " HEP encouraged - Will renew cyclobenzaprine 5mg 1-2 tabs QHS - HEP encouraged - f/u 6 weeks." Overall stable on the flexeril; pain continues to shoot down the legs and is worse in the AM. No new onset weakness or changes in bladder or bowel.   2/22/23: Here for fu; overall stable on the cyclobenzaprine and it helps to manage his pain. Pain continues to shoot down the legs. The cyclobenzaprine is keeping him off the narcotic medication.  4/12/23: Here for f/up. Plan at last "Will renew cyclobenzaprine 5mg 1-2 tabs QHS - HEP encouraged -  no change in disability - would continue to request this medication as he is using it in place of narcotic medication and it seems to help -  f/u 6 weeks." No sig changes since last visit. Cyclobenzaprine continues to be beneficial   5/24/2023; Here for fu in the lower back; continued pain to the lower back. No new onset weakness or loss of b/b control. He takes the flexeril for intermittent acute flare ups of pain which helps. He has been ambulating with a cane.   7/5/23: Here for fu -pain back pain remains - shooting down the both legs - USING CANE-  medicatoin was not filled recently (unclear why not)   xrays today: l spine - scoliosis 24 degrees, spondylosis AP PELVIS - negative   08/23/23  follow up workers comp lower spine; overall doing about the same; he reports no changes. He continues to take the flexeril for acute flare ups of pain with some relief. He is ambulating with a cane.   10/4/23- Lower back pain the same. Radiation down BLEs.  Denies new-onset weakness or b/b dysfunction. Continues to use cane.   11/15/23: Here for fu on the lower back; overall is still with pain; pain can radiate down both legs at times. No new onset weakness. He continues to use the cane.   2/21/24: Here for follow-up on the lower back; overall still with pain. Pain is to the right lower/mid back. He takes the muscle relaxers for exacerbations of pain which occur about every other day. Pain is dependent on his activity. He is using a cane.   5.15.24 Patient here for lower back pain. no changes since last visit Pain in the back and down the legs  using cane  worse with activity  taking the medicatoin without relief   xrays reviewed: l spine - scoliosis 24 degrees, spondylosis AP PELVIS - negative   8.14.24 Patient here for back pain. He reports continued pain. He reports pain to the midback that radiates into both legs to the level of the knees. The pain is dependent on his activity levels. No new onset weakess or loss of b/b control. He takes the cyclobenzaprine for exacerbations of pain as needed. He reports no side effects. He is using a cane.  [] : Post Surgical Visit: no [FreeTextEntry1] : l spine  [FreeTextEntry3] : 09/25/2015 [de-identified] : none

## 2024-08-15 NOTE — DISCUSSION/SUMMARY
[Medication Risks Reviewed] : Medication risks reviewed [de-identified] : no change in disability  continue use of cane Will renew cyclobenzaprine 5mg 1-2 tabs QHS 2 RF  HEP encouraged - would continue to request this medication as he is using it in place of narcotic medication and it seems to help  he uses this for acute flare ups of pain when it occurs.   f/u 3-6 months

## 2024-08-28 ENCOUNTER — NON-APPOINTMENT (OUTPATIENT)
Age: 59
End: 2024-08-28

## 2024-11-13 ENCOUNTER — APPOINTMENT (OUTPATIENT)
Dept: ORTHOPEDIC SURGERY | Facility: CLINIC | Age: 59
End: 2024-11-13
Payer: SELF-PAY

## 2024-11-13 DIAGNOSIS — M51.16 INTERVERTEBRAL DISC DISORDERS WITH RADICULOPATHY, LUMBAR REGION: ICD-10-CM

## 2024-11-13 DIAGNOSIS — M51.26 OTHER INTERVERTEBRAL DISC DISPLACEMENT, LUMBAR REGION: ICD-10-CM

## 2024-11-13 DIAGNOSIS — M47.816 SPONDYLOSIS W/OUT MYELOPATHY OR RADICULOPATHY, LUMBAR REGION: ICD-10-CM

## 2024-11-13 PROCEDURE — 99213 OFFICE O/P EST LOW 20 MIN: CPT

## 2025-02-12 ENCOUNTER — APPOINTMENT (OUTPATIENT)
Dept: ORTHOPEDIC SURGERY | Facility: CLINIC | Age: 60
End: 2025-02-12
Payer: SELF-PAY

## 2025-02-12 DIAGNOSIS — M47.816 SPONDYLOSIS W/OUT MYELOPATHY OR RADICULOPATHY, LUMBAR REGION: ICD-10-CM

## 2025-02-12 DIAGNOSIS — M51.26 OTHER INTERVERTEBRAL DISC DISPLACEMENT, LUMBAR REGION: ICD-10-CM

## 2025-02-12 DIAGNOSIS — M54.41 LUMBAGO WITH SCIATICA, LEFT SIDE: ICD-10-CM

## 2025-02-12 DIAGNOSIS — M54.50 LOW BACK PAIN, UNSPECIFIED: ICD-10-CM

## 2025-02-12 DIAGNOSIS — M54.42 LUMBAGO WITH SCIATICA, LEFT SIDE: ICD-10-CM

## 2025-02-12 DIAGNOSIS — G89.29 LUMBAGO WITH SCIATICA, LEFT SIDE: ICD-10-CM

## 2025-02-12 PROCEDURE — 99213 OFFICE O/P EST LOW 20 MIN: CPT

## 2025-02-12 NOTE — PROGRESS NOTE ADULT - PROBLEM SELECTOR PROBLEM 3
170.18
Diverticulitis of large intestine with abscess without bleeding
Diverticulitis of large intestine with abscess without bleeding
Essential hypertension
Sepsis due to other etiology

## 2025-05-14 ENCOUNTER — APPOINTMENT (OUTPATIENT)
Dept: ORTHOPEDIC SURGERY | Facility: CLINIC | Age: 60
End: 2025-05-14
Payer: SELF-PAY

## 2025-05-14 DIAGNOSIS — M47.816 SPONDYLOSIS W/OUT MYELOPATHY OR RADICULOPATHY, LUMBAR REGION: ICD-10-CM

## 2025-05-14 DIAGNOSIS — M51.16 INTERVERTEBRAL DISC DISORDERS WITH RADICULOPATHY, LUMBAR REGION: ICD-10-CM

## 2025-05-14 DIAGNOSIS — M51.26 OTHER INTERVERTEBRAL DISC DISPLACEMENT, LUMBAR REGION: ICD-10-CM

## 2025-05-14 PROCEDURE — 99214 OFFICE O/P EST MOD 30 MIN: CPT

## 2025-08-13 ENCOUNTER — APPOINTMENT (OUTPATIENT)
Dept: ORTHOPEDIC SURGERY | Facility: CLINIC | Age: 60
End: 2025-08-13
Payer: SELF-PAY

## 2025-08-13 DIAGNOSIS — M51.16 INTERVERTEBRAL DISC DISORDERS WITH RADICULOPATHY, LUMBAR REGION: ICD-10-CM

## 2025-08-13 DIAGNOSIS — M54.42 LUMBAGO WITH SCIATICA, LEFT SIDE: ICD-10-CM

## 2025-08-13 DIAGNOSIS — M47.816 SPONDYLOSIS W/OUT MYELOPATHY OR RADICULOPATHY, LUMBAR REGION: ICD-10-CM

## 2025-08-13 DIAGNOSIS — G89.29 LUMBAGO WITH SCIATICA, LEFT SIDE: ICD-10-CM

## 2025-08-13 DIAGNOSIS — M54.50 LOW BACK PAIN, UNSPECIFIED: ICD-10-CM

## 2025-08-13 DIAGNOSIS — M54.41 LUMBAGO WITH SCIATICA, LEFT SIDE: ICD-10-CM

## 2025-08-13 PROCEDURE — 99214 OFFICE O/P EST MOD 30 MIN: CPT

## 2025-08-13 PROCEDURE — 99212 OFFICE O/P EST SF 10 MIN: CPT
